# Patient Record
Sex: FEMALE | Race: BLACK OR AFRICAN AMERICAN | Employment: UNEMPLOYED | URBAN - METROPOLITAN AREA
[De-identification: names, ages, dates, MRNs, and addresses within clinical notes are randomized per-mention and may not be internally consistent; named-entity substitution may affect disease eponyms.]

---

## 2017-01-11 ENCOUNTER — CLINICAL SUPPORT (OUTPATIENT)
Dept: SURGERY | Age: 37
End: 2017-01-11

## 2017-01-11 DIAGNOSIS — E66.01 MORBID OBESITY WITH BMI OF 40.0-44.9, ADULT (HCC): Primary | ICD-10-CM

## 2017-01-11 NOTE — PROGRESS NOTES
[id number] [facility]  Supervised Weight Loss     Date:   2017    Patient's Name: Cinda Gardiner  : 1980    Insurance:  Sigrid Wright          Session:   Surgery: Sleeve Gastrectomy  Surgeon:  Fanta Bucio M.D. Height: 68\"   Weight:    271   Lbs. BMI: 41   Pounds Lost since last month: 0               Pounds Gained since last month: 1    Starting Weight: 270#   Previous Months Weight: 270#  Overall Pounds Lost: 0  Overall Pounds Gained: 1#    Other Pertinent Information: Patient reports issues with constipation, likely r/t iron supplements. Smoking Status:  Non-smoker   Alcohol Intake: 1 drinks \"occasionally\"     I have reviewed with patient the guidelines of the supervised weight loss class. Patient understands the expectations of some weight loss during the weight loss trial.  Patient understands that weight gain could delay the process. I have also expressed to patient that classes need to be consecutive. Missing a class may subject patient to have to start their trial over. Patient has received this information in writing. Changes that patient has made since last month include:  Eating more vegetables, drinking protein shakes at breakfast and portion control. Eating Habits and Behaviors      Today we reviewed the general diet principles for weight loss surgery. I have talked with patient about what their plate should be made up of. Their plate should be made up of 1/2 coming from non-starchy vegetables, 1/4 coming from lean meat, and 1/4 of their plate coming from carbohydrates, including fruits, starches, or milk. Emphasis was placed on the importance of eating 3 meals a day and aiming for 60 grams of protein per day. I educated the patient on limiting liquid calories and drinking only calorie-free, sugar-free and non-carbonated beverages.  We discussed the importance of drinking 64 ounces of fluid per day to prevent dehydration post-operatively. A nutrition education lesson regarding vitamin and mineral supplements was provided and the importance of preventing nutrient deficiencies post-operatively. Patient's current diet habits include: eating 2-3 meals per day. Sometimes skipping breakfast d/t lack of time d/t working 2 jobs. Snacking on peanut butter and apples. Limiting refined carbohydrates and sweets. Pt shares grocery shopping and cooking with her daughter eating a combination of baked, grilled, broiled and fried foods. Eating out is 1-3 times per week. Drinks 64 oz water daily and 16 oz unsweetened tea. Recently drinking [employer] beverages but vows to limit d/t high calorie and sugar content. Reports yes to emotional eating and denies situational eating. Packing meals when away from home. Eating most meals at a table and taking 10-15 minutes to finish the meal. States she tends to eat quickly d/t not tasting the food very well d/t mouthpiece that she wears. Denies any difficult chewing or swallowing. Physical Activity/Exercise  During class we discussed the importance of increasing daily physical activity and beginning to develop an exercise regimen/routine. Exercise is an important part of long term weight loss. Comments:  During class, I discussed with patient the importance of getting into an exercise routine. Patient is currently walking for activity. Patient has been encouraged to increase and consider using gym facility at work to add an extra 15 minutes of exercise per day. Behavior Modification       Comments:  Patient was encouraged to keep a food journal and record what they were taking in daily. We discussed the importance of making certain behavior changes in order to be successful long term after weight loss surgery. Overall Assessment: patient demonstrates small realistic changes this past month showing that she understands the general nutrition recommendations.  Will continue to assess patient as she works to complete 6 months supervised wt loss requirements. Goals:   1. Nutrition - working on eating 3 meals a day - prepping hardboiled eggs for week ahead d/t busy schedule  2. Exercise - increase by using the gym at work and adding 15 minutes per day   3.  Behavior -continue to slow eating pace by removing mouth piece to better taste the food which will help slow pace    Diana Martinez, RD  1/11/2017

## 2017-02-05 ENCOUNTER — APPOINTMENT (OUTPATIENT)
Dept: GENERAL RADIOLOGY | Age: 37
End: 2017-02-05
Attending: PHYSICIAN ASSISTANT
Payer: COMMERCIAL

## 2017-02-05 ENCOUNTER — HOSPITAL ENCOUNTER (EMERGENCY)
Age: 37
Discharge: HOME | End: 2017-02-05
Attending: INTERNAL MEDICINE | Admitting: INTERNAL MEDICINE
Payer: COMMERCIAL

## 2017-02-05 VITALS
HEIGHT: 68 IN | TEMPERATURE: 98.3 F | RESPIRATION RATE: 20 BRPM | DIASTOLIC BLOOD PRESSURE: 56 MMHG | WEIGHT: 270 LBS | SYSTOLIC BLOOD PRESSURE: 137 MMHG | OXYGEN SATURATION: 99 % | BODY MASS INDEX: 40.92 KG/M2 | HEART RATE: 98 BPM

## 2017-02-05 DIAGNOSIS — J06.9 ACUTE UPPER RESPIRATORY INFECTION: ICD-10-CM

## 2017-02-05 DIAGNOSIS — J20.9 ACUTE BRONCHITIS, UNSPECIFIED ORGANISM: Primary | ICD-10-CM

## 2017-02-05 DIAGNOSIS — L01.00 IMPETIGO: ICD-10-CM

## 2017-02-05 LAB — HCG UR QL: NEGATIVE

## 2017-02-05 PROCEDURE — 99283 EMERGENCY DEPT VISIT LOW MDM: CPT

## 2017-02-05 PROCEDURE — 71020 XR CHEST PA LAT: CPT

## 2017-02-05 PROCEDURE — 81025 URINE PREGNANCY TEST: CPT

## 2017-02-05 RX ORDER — LORATADINE AND PSEUDOEPHEDRINE 10; 240 MG/1; MG/1
1 TABLET, EXTENDED RELEASE ORAL DAILY
Qty: 10 TAB | Refills: 0 | Status: SHIPPED | OUTPATIENT
Start: 2017-02-05 | End: 2018-03-30

## 2017-02-05 RX ORDER — AZITHROMYCIN 250 MG/1
TABLET, FILM COATED ORAL
Qty: 6 TAB | Refills: 0 | Status: SHIPPED | OUTPATIENT
Start: 2017-02-05 | End: 2017-03-12

## 2017-02-05 RX ORDER — MUPIROCIN 20 MG/G
OINTMENT TOPICAL 3 TIMES DAILY
Qty: 22 G | Refills: 0 | Status: SHIPPED | OUTPATIENT
Start: 2017-02-05 | End: 2017-03-12

## 2017-02-05 NOTE — ED PROVIDER NOTES
Patient is a 42 y.o. female presenting with cold symptoms. The history is provided by the patient. Cold Symptoms    This is a new problem. Episode onset: 1 wk. The problem has not changed since onset. There has been no fever. Associated symptoms include congestion, ear pain, sinus pain, cough and rash. Pertinent negatives include no chest pain, no abdominal pain, no diarrhea, no nausea, no vomiting, no swollen glands and no wheezing. She has tried nothing for the symptoms. Past Medical History:   Diagnosis Date    Asthma     GERD (gastroesophageal reflux disease)     Hx gestational diabetes     Morbid obesity due to excess calories ([facility] [address] 11/17/2016    Trouble in sleeping        No past surgical history on file. Family History:   Problem Relation Age of Onset    Cancer Mother     Diabetes Mother     Hypertension Mother        Social History     Social History    Marital status: SINGLE     Spouse name: N/A    Number of children: 2    Years of education: N/A     Occupational History    Residential Counselor      Social History Main Topics    Smoking status: Former Smoker     Packs/day: 0.25     Years: 15.00    Smokeless tobacco: Never Used    Alcohol use Yes      Comment: occasionally    Drug use: No    Sexual activity: No     Other Topics Concern    Not on file     Social History Narrative         ALLERGIES: Review of patient's allergies indicates no known allergies. Review of Systems   Constitutional: Negative for fever. HENT: Positive for congestion, ear pain and facial swelling. Respiratory: Positive for cough. Negative for shortness of breath, wheezing and stridor. Cardiovascular: Negative for chest pain. Gastrointestinal: Negative for abdominal pain, diarrhea, nausea and vomiting. Musculoskeletal: Positive for myalgias. Skin: Positive for rash and wound. All other systems reviewed and are negative.       Vitals:    02/05/17 1031   BP: 137/56   Pulse: 98   Resp: 20 Temp: 98.3 °F (36.8 °C)   SpO2: 99%   Weight: 122.5 kg (270 lb)   Height: 5' 8\" (1.727 m)            Physical Exam   Constitutional: She is oriented to person, place, and time. She appears well-developed and well-nourished. No distress. HENT:   Head: Normocephalic and atraumatic. Right Ear: Tympanic membrane normal.   Left Ear: Tympanic membrane normal.   Mouth/Throat: Uvula is midline, oropharynx is clear and moist and mucous membranes are normal. No oropharyngeal exudate. Eyes: Conjunctivae are normal.   Cardiovascular: Normal rate, regular rhythm and normal heart sounds. Pulmonary/Chest: Effort normal and breath sounds normal. No respiratory distress. She has no wheezes. She has no rales. Musculoskeletal: Normal range of motion. Neurological: She is alert and oriented to person, place, and time. Skin: Skin is warm and dry. Rash noted. There is erythema. Psychiatric: She has a normal mood and affect. Her behavior is normal. Judgment and thought content normal.   Nursing note and vitals reviewed.        MDM  Number of Diagnoses or Management Options  Diagnosis management comments: DDX: URI, impetigo, PNA, bronchitis       Amount and/or Complexity of Data Reviewed  Clinical lab tests: ordered and reviewed  Tests in the radiology section of CPT®: ordered and reviewed      ED Course       Procedures

## 2017-02-05 NOTE — LETTER
[facility] EMERGENCY DEPT 
[address] [facility] [address] [id number] [phone] Work/School Note Date: 2/5/2017 To Whom It May concern: 
 
Dionicio Inman was seen and treated today in the emergency room by the following provider(s): 
Attending Provider: Don Cabrera MD 
Physician Assistant: Laurie Agosto PA-C. Dionicio Inman may return to work on 2/7/17. Sincerely, Laurie Agosto PA-C

## 2017-02-05 NOTE — ED NOTES
Pt accepted plan of care and NAD noted. Pt refuse W/C and left unit steady gait. Patient (s)  given copy of dc instructions and 3 script(s). Patient(s)  verbalized understanding of instructions and script (s). Patient given a current medication reconciliation form and verbalized understanding of their medications. Patient (s) verbalized understanding of the importance of discussing medications with  his or her physician or clinic when they follow up. Patient alert and oriented and in no acute distress. Pt verbalizes pain scale of 5 out of 10. Patient discharged home ambulatory with self.

## 2017-02-05 NOTE — ED NOTES
Pt return from X-Ray Pt sts cold sx for 1 week and symptoms        Emergency Department Nursing Plan of Care       The Nursing Plan of Care is developed from the Nursing assessment and Emergency Department Attending provider initial evaluation. The plan of care may be reviewed in the ED Provider note.     The Plan of Care was developed with the following considerations:   Patient / Family readiness to learn indicated by:verbalized understanding  Persons(s) to be included in education: patient  Barriers to Learning/Limitations:No    Signed     Adam Soriano RN    2/5/2017   11:29 AM

## 2017-02-05 NOTE — DISCHARGE INSTRUCTIONS
Upper Respiratory Infection (Cold): Care Instructions  Your Care Instructions    An upper respiratory infection, or URI, is an infection of the nose, sinuses, or throat. URIs are spread by coughs, sneezes, and direct contact. The common cold is the most frequent kind of URI. The flu and sinus infections are other kinds of URIs. Almost all URIs are caused by viruses. Antibiotics won't cure them. But you can treat most infections with home care. This may include drinking lots of fluids and taking over-the-counter pain medicine. You will probably feel better in 4 to 10 days. The doctor has checked you carefully, but problems can develop later. If you notice any problems or new symptoms, get medical treatment right away. Follow-up care is a key part of your treatment and safety. Be sure to make and go to all appointments, and call your doctor if you are having problems. It's also a good idea to know your test results and keep a list of the medicines you take. How can you care for yourself at home? · To prevent dehydration, drink plenty of fluids, enough so that your urine is light yellow or clear like water. Choose water and other caffeine-free clear liquids until you feel better. If you have kidney, heart, or liver disease and have to limit fluids, talk with your doctor before you increase the amount of fluids you drink. · Take an over-the-counter pain medicine, such as acetaminophen (Tylenol), ibuprofen (Advil, Motrin), or naproxen (Aleve). Read and follow all instructions on the label. · Before you use cough and cold medicines, check the label. These medicines may not be safe for young children or for people with certain health problems. · Be careful when taking over-the-counter cold or flu medicines and Tylenol at the same time. Many of these medicines have acetaminophen, which is Tylenol. Read the labels to make sure that you are not taking more than the recommended dose.  Too much acetaminophen (Tylenol) can be harmful. · Get plenty of rest.  · Do not smoke or allow others to smoke around you. If you need help quitting, talk to your doctor about stop-smoking programs and medicines. These can increase your chances of quitting for good. When should you call for help? Call 911 anytime you think you may need emergency care. For example, call if:  · You have severe trouble breathing. Call your doctor now or seek immediate medical care if:  · You seem to be getting much sicker. · You have new or worse trouble breathing. · You have a new or higher fever. · You have a new rash. Watch closely for changes in your health, and be sure to contact your doctor if:  · You have a new symptom, such as a sore throat, an earache, or sinus pain. · You cough more deeply or more often, especially if you notice more mucus or a change in the color of your mucus. · You do not get better as expected. Where can you learn more? Go to http://yulisa-willian.info/. Enter H340 in the search box to learn more about \"Upper Respiratory Infection (Cold): Care Instructions. \"  Current as of: June 30, 2016  Content Version: 11.1  © 6977-8610 [employer]. Care instructions adapted under license by [employer] (which disclaims liability or warranty for this information). If you have questions about a medical condition or this instruction, always ask your healthcare professional. [place] [address] any warranty or liability for your use of this information. Bronchitis: Care Instructions  Your Care Instructions    Bronchitis is inflammation of the bronchial tubes, which carry air to the lungs. The tubes swell and produce mucus, or phlegm. The mucus and inflamed bronchial tubes make you cough. You may have trouble breathing. Most cases of bronchitis are caused by viruses like those that cause colds. Antibiotics usually do not help and they may be harmful.   Bronchitis usually develops rapidly and lasts about 2 to 3 weeks in otherwise healthy people. Follow-up care is a key part of your treatment and safety. Be sure to make and go to all appointments, and call your doctor if you are having problems. It's also a good idea to know your test results and keep a list of the medicines you take. How can you care for yourself at home? · Take all medicines exactly as prescribed. Call your doctor if you think you are having a problem with your medicine. · Get some extra rest.  · Take an over-the-counter pain medicine, such as acetaminophen (Tylenol), ibuprofen (Advil, Motrin), or naproxen (Aleve) to reduce fever and relieve body aches. Read and follow all instructions on the label. · Do not take two or more pain medicines at the same time unless the doctor told you to. Many pain medicines have acetaminophen, which is Tylenol. Too much acetaminophen (Tylenol) can be harmful. · Take an over-the-counter cough medicine that contains dextromethorphan to help quiet a dry, hacking cough so that you can sleep. Avoid cough medicines that have more than one active ingredient. Read and follow all instructions on the label. · Breathe moist air from a humidifier, hot shower, or sink filled with hot water. The heat and moisture will thin mucus so you can cough it out. · Do not smoke. Smoking can make bronchitis worse. If you need help quitting, talk to your doctor about stop-smoking programs and medicines. These can increase your chances of quitting for good. When should you call for help? Call 911 anytime you think you may need emergency care. For example, call if:  · You have severe trouble breathing. Call your doctor now or seek immediate medical care if:  · You have new or worse trouble breathing. · You cough up dark brown or bloody mucus (sputum). · You have a new or higher fever. · You have a new rash.   Watch closely for changes in your health, and be sure to contact your doctor if:  · You cough more deeply or more often, especially if you notice more mucus or a change in the color of your mucus. · You are not getting better as expected. Where can you learn more? Go to http://yulisa-willian.info/. Enter H333 in the search box to learn more about \"Bronchitis: Care Instructions. \"  Current as of: May 23, 2016  Content Version: 11.1  © 2006-2016 [employer]. Care instructions adapted under license by "[employer] (which disclaims liability or warranty for this information). If you have questions about a medical condition or this instruction, always ask your healthcare professional. [place] [address] any warranty or liability for your use of this information. Impetigo: Care Instructions  Your Care Instructions  Impetigo (say \"dr-zzl-OQ-go\") is a skin infection caused by bacteria. It causes blisters that break and become oozing, yellow, crusty sores. Impetigo can be anywhere on the body. Scratching the sores may spread the infection to other parts of the body. You can also spread it to others through close contact or when you share towels, clothing, and other items. Prescription antibiotic ointment or pills can usually cure impetigo. (After a day of antibiotics, the infection should not spread.)  Follow-up care is a key part of your treatment and safety. Be sure to make and go to all appointments, and call your doctor if you are having problems. It's also a good idea to know your test results and keep a list of the medicines you take. How can you care for yourself at home? · Apply antibiotic ointment exactly as instructed. · If your doctor prescribed antibiotic pills, take them as directed. Do not stop using them just because you feel better. You need to take the full course of antibiotics. · Gently wash the sores with soap and water each day. If crusts form, your doctor may advise you to soften or remove the crusts.  You can do this by soaking them in warm water and patting them dry. This can help the cream or ointment treat impetigo. · After you touch the area, wash your hands with soap and water. Or you can use an alcohol-based hand . · Don't share items such as towels, sheets, and clothing until the infection is gone. · Wash anything that may have touched the infected area. · Try to avoid scratching the area. When should you call for help? Call your doctor now or seek immediate medical care if:  · You have symptoms of a worse infection, such as:  ¨ Increased pain, swelling, warmth, or redness. ¨ Red streaks leading from the area. ¨ Pus draining from the area. ¨ A fever. · Impetigo gets worse or spreads to other areas. Watch closely for changes in your health, and be sure to contact your doctor if:  · You do not get better as expected. Where can you learn more? Go to http://yulisa-willian.info/. Enter R056 in the search box to learn more about \"Impetigo: Care Instructions. \"  Current as of: July 26, 2016  Content Version: 11.1  © 4375-7912 [employer]. Care instructions adapted under license by [employer] (which disclaims liability or warranty for this information). If you have questions about a medical condition or this instruction, always ask your healthcare professional. [address] any warranty or liability for your use of this information.

## 2017-02-15 ENCOUNTER — CLINICAL SUPPORT (OUTPATIENT)
Dept: SURGERY | Age: 37
End: 2017-02-15

## 2017-02-15 DIAGNOSIS — E66.01 MORBID OBESITY WITH BMI OF 40.0-44.9, ADULT (HCC): Primary | ICD-10-CM

## 2017-02-16 VITALS — WEIGHT: 276 LBS | HEIGHT: 68 IN | BODY MASS INDEX: 41.83 KG/M2

## 2017-02-16 NOTE — PROGRESS NOTES
[address] [facility]  Supervised Weight Loss     Date:   2017    Patient's Name: Charlotta Gowers  : 1980    Insurance:  Pricilla Pelayo          Session: 3 of  6  Surgery: Sleeve Gastrectomy  Surgeon:  Cesar Agrawal M.D. Height: 68\"   Weight:    276      Lbs. BMI: 41   Pounds Lost since last month: 0               Pounds Gained since last month: 5#    Starting Weight: 270#   Previous Months Weight: 271#   Overall Pounds Lost: 0  Overall Pounds Gained: 6#    Other Pertinent Information: Patient reports difficulty with making changes this past month d/t working two jobs and limited time. Smoking Status:  None   Alcohol Intake: none     I have reviewed with patient the guidelines of the supervised weight loss class. Patient understands the expectations of some weight loss during the weight loss trial.  Patient understands that weight gain could delay the process. I have also expressed to patient that classes need to be consecutive. Missing a class may subject patient to have to start their trial over. Patient has received this information in writing. Changes that patient has made since last month include:  Sipping water, portion control, 2 meals a day and a protein shake. Eating Habits and Behaviors      Today we reviewed the general diet principles for weight loss surgery. I have talked with patient about what their plate should be made up of. Their plate should be made up of 1/2 coming from non-starchy vegetables, 1/4 coming from lean meat, and 1/4 of their plate coming from carbohydrates, including fruits, starches, or milk. Emphasis was placed on the importance of eating 3 meals a day and aiming for 60 grams of protein per day. I educated the patient on limiting liquid calories and drinking only calorie-free, sugar-free and non-carbonated beverages.  We discussed the importance of drinking 64 ounces of fluid per day to prevent dehydration post-operatively. Patient's current diet habits include: eating 3 meals a day, snacking on fruit and donuts. Eating donuts 3 times per week. Eating more chips, pasta, rice, cereal and bread recently d/t limited packing and preparing meals at home d/t busy schedule with working 2 jobs. Eating out 1-3 times per week. Eating a mixture of baked, grilled, broiled and fried foods. Drinking 48 oz fluids daily of which 32 oz is water, 16 oz is unsweetened tea and occasional ginger ale and coffee. Denies emotional or situational eating. Was previously packing meals to take to work but has limited ability the past few weeks. Is planning to resume. States lack of activity, snacking and eating at night are her biggest challenges. Physical Activity/Exercise  During class we discussed the importance of increasing daily physical activity and beginning to develop an exercise regimen/routine. Exercise is an important part of long term weight loss. Comments:  During class, I discussed with patient the importance of getting into an exercise routine. Patient is currently not exercising but has increased amount of daily movement and tracking her steps. Patient has been encouraged to continue to increase as tolerated. Behavior Modification       Comments:  Patient was encouraged to keep a food journal and record what they were taking in daily. We discussed the importance of making certain behavior changes in order to be successful long term after weight loss surgery. An education lesson regarding mindful eating behaviors was provided. Emphasis was placed on taking smaller bites, chewing foods slowly and thoroughly, allowing 20 minutes to finish a meal and avoiding distractions while eating. We discussed emotional eating and non-food coping mechanisms. Overall Assessment: Patient demonstrates understanding of general nutrition guidelines for weight loss surgery. Is aware of difficulty with making changes this past month d/t working two jobs and time constraints. Plans to resume previously made changes and is hoping to consolidate to one job in the future so she has more time to focus on lifestyle changes. Goals:   1. Nutrition - packs meals on Mondays and Wednesday for several days ahead  2. Exercise - continue to increase # of steps per day  3.  Behavior -small bites and chew foods slowly and thoroughly    Stan Garcia RD  2/16/2017

## 2017-03-12 ENCOUNTER — APPOINTMENT (OUTPATIENT)
Dept: GENERAL RADIOLOGY | Age: 37
End: 2017-03-12
Attending: PHYSICIAN ASSISTANT
Payer: COMMERCIAL

## 2017-03-12 ENCOUNTER — HOSPITAL ENCOUNTER (EMERGENCY)
Age: 37
Discharge: HOME | End: 2017-03-12
Attending: EMERGENCY MEDICINE | Admitting: EMERGENCY MEDICINE
Payer: COMMERCIAL

## 2017-03-12 VITALS
BODY MASS INDEX: 40.47 KG/M2 | RESPIRATION RATE: 18 BRPM | WEIGHT: 267 LBS | SYSTOLIC BLOOD PRESSURE: 143 MMHG | DIASTOLIC BLOOD PRESSURE: 83 MMHG | HEART RATE: 95 BPM | OXYGEN SATURATION: 97 % | TEMPERATURE: 98.1 F | HEIGHT: 68 IN
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
## 2017-03-12 DIAGNOSIS — S53.402A ELBOW SPRAIN, LEFT, INITIAL ENCOUNTER: ICD-10-CM

## 2017-03-12 DIAGNOSIS — S91.119A TOE LACERATION, INITIAL ENCOUNTER: ICD-10-CM

## 2017-03-12 DIAGNOSIS — S80.02XA CONTUSION OF LEFT KNEE, INITIAL ENCOUNTER: ICD-10-CM

## 2017-03-12 DIAGNOSIS — Z23 NEED FOR TETANUS BOOSTER: ICD-10-CM

## 2017-03-12 PROCEDURE — 99283 EMERGENCY DEPT VISIT LOW MDM: CPT

## 2017-03-12 PROCEDURE — 73562 X-RAY EXAM OF KNEE 3: CPT

## 2017-03-12 PROCEDURE — 74011250636 HC RX REV CODE- 250/636: Performed by: PHYSICIAN ASSISTANT

## 2017-03-12 PROCEDURE — 90715 TDAP VACCINE 7 YRS/> IM: CPT | Performed by: PHYSICIAN ASSISTANT

## 2017-03-12 PROCEDURE — 90471 IMMUNIZATION ADMIN: CPT

## 2017-03-12 PROCEDURE — 74011250637 HC RX REV CODE- 250/637: Performed by: PHYSICIAN ASSISTANT

## 2017-03-12 PROCEDURE — L1830 KO IMMOB CANVAS LONG PRE OTS: HCPCS

## 2017-03-12 PROCEDURE — 73080 X-RAY EXAM OF ELBOW: CPT

## 2017-03-12 RX ORDER — TRAMADOL HYDROCHLORIDE 50 MG/1
50 TABLET ORAL
Qty: 15 TAB | Refills: 0 | Status: SHIPPED | OUTPATIENT
Start: 2017-03-12 | End: 2017-04-21

## 2017-03-12 RX ORDER — IBUPROFEN 600 MG/1
600 TABLET ORAL
Status: COMPLETED | OUTPATIENT
Start: 2017-03-12 | End: 2017-03-12

## 2017-03-12 RX ORDER — IBUPROFEN 600 MG/1
600 TABLET ORAL
Qty: 30 TAB | Refills: 0 | Status: SHIPPED | OUTPATIENT
Start: 2017-03-12 | End: 2017-04-21

## 2017-03-12 RX ORDER — TRAMADOL HYDROCHLORIDE 50 MG/1
50 TABLET ORAL
Status: COMPLETED | OUTPATIENT
Start: 2017-03-12 | End: 2017-03-12

## 2017-03-12 RX ADMIN — IBUPROFEN 600 MG: 600 TABLET, FILM COATED ORAL at 17:50

## 2017-03-12 RX ADMIN — TRAMADOL HYDROCHLORIDE 50 MG: 50 TABLET, FILM COATED ORAL at 17:50

## 2017-03-12 RX ADMIN — TETANUS TOXOID, REDUCED DIPHTHERIA TOXOID AND ACELLULAR PERTUSSIS VACCINE, ADSORBED 0.5 ML: 5; 2.5; 8; 8; 2.5 SUSPENSION INTRAMUSCULAR at 17:49

## 2017-03-12 NOTE — ED NOTES
Patient states she is here today with c/o of a fall in the bathtub on Friday (3 days ago). She denies hitting her head. Left knee, right flank and left elbow and states she cut her right big toe.

## 2017-03-12 NOTE — ED PROVIDER NOTES
Patient is a 42 y.o. female presenting with fall. The history is provided by the patient. Fall   The accident occurred 2 days ago. Fall occurred: sliped when getting into tub. landed on left knee and left elbow. She fell from a height of ground level. The pain is present in the left elbow and left knee. The pain is at a severity of 7/10. She was ambulatory at the scene. There was no entrapment after the fall. There was no drug use involved in the accident. There was no alcohol use involved in the accident. Pertinent negatives include no fever, no nausea, no vomiting and no loss of consciousness. Laceration: Did notice that she had R toe laceration after fall, unsure what cut on. Exacerbated by: Twisting, walking. She has tried NSAIDs and acetaminophen for the symptoms. It is unknown when the patient last had a tetanus shot. Past Medical History:   Diagnosis Date    Asthma     GERD (gastroesophageal reflux disease)     Hx gestational diabetes     Morbid obesity due to excess calories ([facility] [address] 11/17/2016    Trouble in sleeping        No past surgical history on file. Family History:   Problem Relation Age of Onset    Cancer Mother     Diabetes Mother     Hypertension Mother        Social History     Social History    Marital status: SINGLE     Spouse name: N/A    Number of children: 2    Years of education: N/A     Occupational History    Residential Counselor      Social History Main Topics    Smoking status: Former Smoker     Packs/day: 0.25     Years: 15.00    Smokeless tobacco: Never Used    Alcohol use Yes      Comment: occasionally    Drug use: No    Sexual activity: No     Other Topics Concern    Not on file     Social History Narrative         ALLERGIES: Review of patient's allergies indicates no known allergies. Review of Systems   Constitutional: Negative for chills and fever. Eyes: Negative for pain. Respiratory: Negative for cough and chest tightness. Cardiovascular: Negative for chest pain and leg swelling. Gastrointestinal: Negative for nausea and vomiting. Musculoskeletal: Positive for arthralgias and myalgias. Skin: Positive for wound (laceration R great toe. ). Neurological: Negative for dizziness, loss of consciousness, syncope and light-headedness. Psychiatric/Behavioral: Negative for behavioral problems and confusion. All other systems reviewed and are negative. Vitals:    03/12/17 1655   BP: 146/79   Pulse: 95   Resp: 18   Temp: 98.1 °F (36.7 °C)   SpO2: 99%   Weight: 121.1 kg (267 lb)   Height: 5' 8\" (1.727 m)            Physical Exam   Constitutional: She is oriented to person, place, and time. She appears well-developed and well-nourished. HENT:   Head: Normocephalic and atraumatic. Eyes: Pupils are equal, round, and reactive to light. Neck: Normal range of motion. Neck supple. Cardiovascular: Normal rate, regular rhythm and normal heart sounds. Pulmonary/Chest: Effort normal. She has no wheezes. She has no rales. Abdominal: Soft. There is no rebound and no guarding. Lymphadenopathy:     She has no cervical adenopathy. Neurological: She is alert and oriented to person, place, and time. Skin: Skin is warm and dry. Laceration: Did notice that she had R toe laceration after fall, unsure what cut on. Psychiatric: She has a normal mood and affect. Her behavior is normal.   Nursing note and vitals reviewed. C/T/L spine no vertebral tenderness, no step offs,   Shoulders, wrist, hands, ankles, NT  Left elbow with tenderness over olecranon. No bony tenderness to medial/lateral condyles. FROM with pain on supination/ pronation. Skin intact. No swelling. Left knee, diffuse tenderness, mostly to lateral knee. No effusion. Pain with flexion. Stable with ligamentous stress, pain with meniscal maneuvers. R great toe with 1cm shallow laceration. No swelling, tenderness, no drainage. No surrounding erythema. MDM  Number of Diagnoses or Management Options  Contusion of left knee, initial encounter:   Elbow sprain, left, initial encounter:   Fall, initial encounter:   Need for tetanus booster: Toe laceration, initial encounter:   Diagnosis management comments: DDX: Fx, sprain, dislocation, Tendon / Ligament injury    ED Course       Procedures    LABORATORY TESTS:  No results found for this or any previous visit (from the past 12 hour(s)). IMAGING RESULTS:  XR KNEE LT 3 V   Final Result      XR ELBOW LT MIN 3 V   Final Result          MEDICATIONS GIVEN:  Medications   ibuprofen (MOTRIN) tablet 600 mg (600 mg Oral Given 3/12/17 1750)   traMADol (ULTRAM) tablet 50 mg (50 mg Oral Given 3/12/17 1750)   diph,Pertuss(AC),Tet Vac-PF (BOOSTRIX) suspension 0.5 mL (0.5 mL IntraMUSCular Given 3/12/17 174)       IMPRESSION:  1. Fall, initial encounter    2. Elbow sprain, left, initial encounter    3. Contusion of left knee, initial encounter    4. Need for tetanus booster    5. Toe laceration, initial encounter        PLAN:  1. Discharge Medication List as of 3/12/2017  6:25 PM      START taking these medications    Details   ibuprofen (MOTRIN) 600 mg tablet Take 1 Tab by mouth every eight (8) hours as needed for Pain., Normal, Disp-30 Tab, R-0      traMADol (ULTRAM) 50 mg tablet Take 1 Tab by mouth every six (6) hours as needed for Pain. Max Daily Amount: 200 mg., Print, Disp-15 Tab, R-0         CONTINUE these medications which have NOT CHANGED    Details   omeprazole (PRILOSEC) 20 mg capsule Take 20 mg by mouth daily. , Historical Med      Cholecalciferol, Vitamin D3, 50,000 unit cap Take  by mouth every seven (7) days. , Historical Med      loratadine-pseudoephedrine (CLARITIN-D 24 HOUR)  mg per tablet Take 1 Tab by mouth daily. , Normal, Disp-10 Tab, R-0         STOP taking these medications       meloxicam (MOBIC) 15 mg tablet Comments:   Reason for Stoppin.   Follow-up Information     Follow up With Details Comments Contact Info    Luis Ndiaye NP In 1 week If not improving, be sure to follow up with PCP for further management.   [address]  [address]  [phone]          Return to ED if worse

## 2017-03-12 NOTE — ED NOTES
Emergency Department Nursing Plan of Care       The Nursing Plan of Care is developed from the Nursing assessment and Emergency Department Attending provider initial evaluation. The plan of care may be reviewed in the ED Provider note.     The Plan of Care was developed with the following considerations:   Patient / Family readiness to learn indicated by:verbalized understanding  Persons(s) to be included in education: patient  Barriers to Learning/Limitations:No    [address] Sar[address], RN    3/12/2017   5:32 PM

## 2017-03-28 ENCOUNTER — DOCUMENTATION ONLY (OUTPATIENT)
Dept: SURGERY | Age: 37
End: 2017-03-28

## 2017-03-28 ENCOUNTER — CLINICAL SUPPORT (OUTPATIENT)
Dept: SURGERY | Age: 37
End: 2017-03-28

## 2017-03-28 VITALS — HEIGHT: 68 IN | BODY MASS INDEX: 42.74 KG/M2 | WEIGHT: 282 LBS

## 2017-03-28 DIAGNOSIS — E66.01 MORBID OBESITY WITH BMI OF 40.0-44.9, ADULT (HCC): Primary | ICD-10-CM

## 2017-03-28 NOTE — PROGRESS NOTES
[id number] [facility] at [facility]  Supervised Weight Loss     Date:   3/28/2017    Patient's Name: Argelia Loza  : 1980    Insurance:  Shadi Paul          Session:   Surgery: Sleeve Gastrectomy  Surgeon:  Marlena Bernard M.D. Height: 68\"  Weight:    282      Lbs. BMI: 42   Pounds Lost since last month: 0               Pounds Gained since last month: 6#    Starting Weight: 270#   Previous Months Weight: 276#  Overall Pounds Lost: 0  Overall Pounds Gained: 12#    Other Pertinent Information: n/a     Smoking Status:  None   Alcohol Intake: none     I have reviewed with patient the guidelines of the supervised weight loss class. Patient understands the expectations of some weight loss during the weight loss trial.  Patient understands that weight gain could delay the process. I have also expressed to patient that classes need to be consecutive. Missing a class may subject patient to have to start their trial over. Patient has received this information in writing. Changes that patient has made since last month include:  Sipping water, protein shakes. Eating Habits and Behaviors      Today we reviewed the general diet principles for weight loss surgery. And education less focused specifically on portion control both before and after surgery was provided. We discussed that their plate should be made up of 1/2 plate non-starchy vegetables, 1/4 coming from lean meat, and 1/4 of their plate coming from carbohydrates, including fruits, starches, or milk. We talked about the importance of measuring meals to 1/2 cup total after surgery and making at least half of the meal lean protein. Emphasis was placed on the importance of eating 3 meals a day and aiming for 60 grams of protein per day. I educated the patient on limiting liquid calories and drinking only calorie-free, sugar-free and non-carbonated beverages.  We discussed the importance of drinking 64 ounces of fluid per day to prevent dehydration post-operatively. Patient's current diet habits include: eating 3 meals a day, snacking on fruit. Eating refined carbohydrates (chips, bread) 1-2 times per week and ice cream 1 time per week. Pt does her own grocery shopping and cooking. Eating a mixture of baked, grilled, broiled and fried foods. Eating out is 1-2 times per week. Drinking 64 oz water daily and 12 oz black coffee. Denies emotional eating and sometimes situational eating. Eating most meals at work and takes 10-15 minutes to finish the meal. States food choices and lack of activity are biggest barriers to weight loss. Physical Activity/Exercise  During class we discussed the importance of increasing daily physical activity and beginning to develop an exercise regimen/routine. Exercise is an important part of long term weight loss. Comments:  During class, I discussed with patient the importance of getting into an exercise routine. Patient is currently not exercising stating lack of time for activity. Patient has been encouraged to consider short intervals spaced throughout the day. Utilize gym/exercise equipment at work. Behavior Modification       Comments:  Patient was encouraged to keep a food journal and record what they were taking in daily. We discussed the importance of making certain behavior changes in order to be successful long term after weight loss surgery. Overall Assessment: Patient demonstrates difficulty with making consistent lifestyle changes evidenced by weight gain and reported limited adherence to nutrition related recommendations. Difficulty appears to be r/t limited time and busy work schedules. Will continue to assess readiness as she works to complete supervised weight loss requirements. Goals:   1. Nutrition - prepare healthy meals at home  2. Exercise - join a gym  3.  Behavior -focus on goals    Saul Bowling RD  3/28/2017

## 2017-03-31 ENCOUNTER — TELEPHONE (OUTPATIENT)
Dept: SURGERY | Age: 37
End: 2017-03-31

## 2017-03-31 DIAGNOSIS — A04.8 POSITIVE H. PYLORI TEST: Primary | ICD-10-CM

## 2017-03-31 DIAGNOSIS — K21.9 GASTROESOPHAGEAL REFLUX DISEASE WITHOUT ESOPHAGITIS: ICD-10-CM

## 2017-03-31 NOTE — TELEPHONE ENCOUNTER
Telephone call with patient to discuss recent positive H.Pylori results. Advised patient will send in prescription for generic Helidac pack to take for 2 weeks. Will mail labs slip to recheck. H. Pylori. Informed patient that if results are positive again after this treatment will consult GI. Patient agreed. Advised if any questions or concerns to call the office.

## 2017-04-14 ENCOUNTER — TELEPHONE (OUTPATIENT)
Dept: SURGERY | Age: 37
End: 2017-04-14

## 2017-04-14 DIAGNOSIS — A04.8 POSITIVE H. PYLORI TEST: ICD-10-CM

## 2017-04-14 DIAGNOSIS — K21.9 GASTROESOPHAGEAL REFLUX DISEASE WITHOUT ESOPHAGITIS: Primary | ICD-10-CM

## 2017-04-14 RX ORDER — METRONIDAZOLE 500 MG/1
500 TABLET ORAL 3 TIMES DAILY
Qty: 42 TAB | Refills: 0 | Status: SHIPPED | OUTPATIENT
Start: 2017-04-14 | End: 2017-04-28

## 2017-04-14 NOTE — TELEPHONE ENCOUNTER
Telephone call with patient. Patient stated insurance will not cover for Helidac or generic of for positive H. Pylori treatment. Advised patient to take over the counter Pepto Bismuth tablets twice daily for 2 weeks. Will give prescription for Flagyl to take for 2 week. Advised patient once complete treatment, recheck H. Pylori in one month. Patient agreed. Advised if any questions or concerns to call the office.

## 2017-04-18 ENCOUNTER — CLINICAL SUPPORT (OUTPATIENT)
Dept: SURGERY | Age: 37
End: 2017-04-18

## 2017-04-18 VITALS — BODY MASS INDEX: 42.73 KG/M2 | WEIGHT: 281 LBS

## 2017-04-18 DIAGNOSIS — E66.01 MORBID OBESITY WITH BMI OF 40.0-44.9, ADULT (HCC): Primary | ICD-10-CM

## 2017-04-18 NOTE — PROGRESS NOTES
[address] [facility] Surgery at [facility]  Supervised Weight Loss     Date:   2017    Patient's Name: Oralia Ambriz  : 1980    Insurance:  Ashleigh Monae          Session:   Surgery: Sleeve Gastrectomy  Surgeon:  Tanja Dasilva M.D. Height: 68\"  Weight:    281      Lbs. BMI: 42   Pounds Lost since last month: 1#               Pounds Gained since last month: 0    Starting Weight: 270#   Previous Months Weight: 282#  Overall Pounds Lost: 0  Overall Pounds Gained: 11#    Other Pertinent Information: Patient currently being treated for H.Pylori and has upcoming GI consult d/t ongoing \"stomach problems\". Smoking Status:  None   Alcohol Intake: none     I have reviewed with patient the guidelines of the supervised weight loss class. Patient understands the expectations of some weight loss during the weight loss trial.  Patient understands that weight gain could delay the process. I have also expressed to patient that classes need to be consecutive. Missing a class may subject patient to have to start their trial over. Patient has received this information in writing. Changes that patient has made since last month include:  Eating more salads, drinking more water. Eating Habits and Behaviors      Today we reviewed the general diet principles for weight loss surgery. We discussed portion control before surgery using the balanced plate method (1/2 plate non-starchy vegetables, 1/4 coming from lean meat, and 1/4 of their plate coming from carbohydrates). We talked about the importance of measuring meals to 1/2 cup total after surgery and making at least half of the meal lean protein. Emphasis was placed on the importance of eating 3 meals a day and aiming for 60 grams of protein per day. I educated the patient on limiting liquid calories and drinking only calorie-free, sugar-free and non-carbonated beverages.  We discussed the importance of drinking 64 ounces of fluid per day to prevent dehydration post-operatively. Patient's current diet habits include: eating 3 meals a day, snacking on fruit or string cheese. Eating crackers, rice and cereal 1-3 times per week. Avoiding sweets and desserts. Pt does the grocery shopping and cooking for the household. Eating mostly baked, broiled, grilled and steamed foods. Eating out is never. \"Sipping\" a gallon of water daily. Denies emotional or situational eating. Packing meals to take to work. Eating at a table at work and takes 15-20 minutes to chew foods. States lack of activity is biggest barrier to weight loss at this time. Physical Activity/Exercise  During class we discussed the importance of increasing daily physical activity and beginning to develop an exercise regimen/routine. An education lesson was provided including exercise programs and resources, tips for getting started and overcoming barriers and health benefits of exercise. We discussed that exercise is an important part of long term weight loss. Comments:  During class, I discussed with patient the importance of getting into an exercise routine. Patient is currently not exercising d/t lack of time for activity. Patient has been encouraged to consider short intervals of walking spaced throughout the day. Continue to take stairs at work and park further away to help increase daily movement. Behavior Modification       Comments:  Patient was encouraged to keep a food journal and record what they were taking in daily. We discussed the importance of making certain behavior changes in order to be successful long term after weight loss surgery. Overall Assessment: Patient demonstrates small realistic lifestyle changes in preparation for weight loss surgery evidenced by reported changes and discussion during appointment today. Appears motivated to maintain discussed changes today.  Pt completed psychological evaluation at which time it was recommended that pt bring her children to a nutrition session to help them understand the changes to the eating habits and lifestyle after wt loss surgery. Pt will bring them to next month's appointment. Will continue to assess patient as she works to complete final two months of supervised wt loss. Goals:   1. Nutrition - continue to prepare foods at home and eating healthy snacks  2. Exercise - use [employer] membership 3 times per week for 1 hour  3.  Behavior - stay positive and motivated     Cesar Nicolas, RD  4/18/2017

## 2017-04-21 RX ORDER — ALBUTEROL SULFATE 90 UG/1
2 AEROSOL, METERED RESPIRATORY (INHALATION) AS NEEDED
COMMUNITY

## 2017-04-24 ENCOUNTER — ANESTHESIA EVENT (OUTPATIENT)
Dept: ENDOSCOPY | Age: 37
End: 2017-04-24
Payer: COMMERCIAL

## 2017-04-24 ENCOUNTER — HOSPITAL ENCOUNTER (OUTPATIENT)
Age: 37
Setting detail: OUTPATIENT SURGERY
Discharge: HOME | End: 2017-04-24
Attending: INTERNAL MEDICINE | Admitting: INTERNAL MEDICINE
Payer: COMMERCIAL

## 2017-04-24 ENCOUNTER — ANESTHESIA (OUTPATIENT)
Dept: ENDOSCOPY | Age: 37
End: 2017-04-24
Payer: COMMERCIAL

## 2017-04-24 VITALS
HEIGHT: 68 IN | WEIGHT: 282.06 LBS | TEMPERATURE: 97.8 F | RESPIRATION RATE: 14 BRPM | OXYGEN SATURATION: 100 % | BODY MASS INDEX: 42.75 KG/M2 | HEART RATE: 76 BPM | SYSTOLIC BLOOD PRESSURE: 122 MMHG | DIASTOLIC BLOOD PRESSURE: 76 MMHG

## 2017-04-24 LAB
H PYLORI FROM TISSUE: POSITIVE
HCG UR QL: NEGATIVE
KIT LOT NO., HCLOLOT: NORMAL
NEGATIVE CONTROL: NORMAL
POSITIVE CONTROL: NORMAL

## 2017-04-24 PROCEDURE — 81025 URINE PREGNANCY TEST: CPT

## 2017-04-24 PROCEDURE — 87077 CULTURE AEROBIC IDENTIFY: CPT | Performed by: INTERNAL MEDICINE

## 2017-04-24 PROCEDURE — 76040000007: Performed by: INTERNAL MEDICINE

## 2017-04-24 PROCEDURE — 74011000250 HC RX REV CODE- 250

## 2017-04-24 PROCEDURE — 88305 TISSUE EXAM BY PATHOLOGIST: CPT | Performed by: INTERNAL MEDICINE

## 2017-04-24 PROCEDURE — 76060000032 HC ANESTHESIA 0.5 TO 1 HR: Performed by: INTERNAL MEDICINE

## 2017-04-24 PROCEDURE — 74011250636 HC RX REV CODE- 250/636: Performed by: INTERNAL MEDICINE

## 2017-04-24 PROCEDURE — 74011250636 HC RX REV CODE- 250/636

## 2017-04-24 PROCEDURE — 77030019988 HC FCPS ENDOSC DISP BSC -B: Performed by: INTERNAL MEDICINE

## 2017-04-24 RX ORDER — SODIUM CHLORIDE 0.9 % (FLUSH) 0.9 %
5-10 SYRINGE (ML) INJECTION AS NEEDED
Status: CANCELLED | OUTPATIENT
Start: 2017-04-24 | End: 2017-04-24

## 2017-04-24 RX ORDER — FLUMAZENIL 0.1 MG/ML
0.2 INJECTION INTRAVENOUS
Status: CANCELLED | OUTPATIENT
Start: 2017-04-24 | End: 2017-04-24

## 2017-04-24 RX ORDER — PROPOFOL 10 MG/ML
INJECTION, EMULSION INTRAVENOUS AS NEEDED
Status: DISCONTINUED | OUTPATIENT
Start: 2017-04-24 | End: 2017-04-24 | Stop reason: HOSPADM

## 2017-04-24 RX ORDER — SODIUM CHLORIDE 0.9 % (FLUSH) 0.9 %
5-10 SYRINGE (ML) INJECTION AS NEEDED
Status: DISCONTINUED | OUTPATIENT
Start: 2017-04-24 | End: 2017-04-24 | Stop reason: HOSPADM

## 2017-04-24 RX ORDER — NALOXONE HYDROCHLORIDE 0.4 MG/ML
0.4 INJECTION, SOLUTION INTRAMUSCULAR; INTRAVENOUS; SUBCUTANEOUS
Status: CANCELLED | OUTPATIENT
Start: 2017-04-24 | End: 2017-04-24

## 2017-04-24 RX ORDER — DEXTROMETHORPHAN/PSEUDOEPHED 2.5-7.5/.8
1.2 DROPS ORAL
Status: CANCELLED | OUTPATIENT
Start: 2017-04-24

## 2017-04-24 RX ORDER — ATROPINE SULFATE 0.1 MG/ML
0.5 INJECTION INTRAVENOUS
Status: CANCELLED | OUTPATIENT
Start: 2017-04-24 | End: 2017-04-24

## 2017-04-24 RX ORDER — EPINEPHRINE 0.1 MG/ML
1 INJECTION INTRACARDIAC; INTRAVENOUS
Status: CANCELLED | OUTPATIENT
Start: 2017-04-24 | End: 2017-04-24

## 2017-04-24 RX ORDER — SODIUM CHLORIDE 0.9 % (FLUSH) 0.9 %
5-10 SYRINGE (ML) INJECTION EVERY 8 HOURS
Status: CANCELLED | OUTPATIENT
Start: 2017-04-24 | End: 2017-04-24

## 2017-04-24 RX ORDER — SODIUM CHLORIDE 0.9 % (FLUSH) 0.9 %
5-10 SYRINGE (ML) INJECTION EVERY 8 HOURS
Status: DISCONTINUED | OUTPATIENT
Start: 2017-04-24 | End: 2017-04-24 | Stop reason: HOSPADM

## 2017-04-24 RX ORDER — LIDOCAINE HYDROCHLORIDE 20 MG/ML
INJECTION, SOLUTION EPIDURAL; INFILTRATION; INTRACAUDAL; PERINEURAL AS NEEDED
Status: DISCONTINUED | OUTPATIENT
Start: 2017-04-24 | End: 2017-04-24 | Stop reason: HOSPADM

## 2017-04-24 RX ORDER — SODIUM CHLORIDE 9 MG/ML
50 INJECTION, SOLUTION INTRAVENOUS CONTINUOUS
Status: DISCONTINUED | OUTPATIENT
Start: 2017-04-24 | End: 2017-04-24 | Stop reason: HOSPADM

## 2017-04-24 RX ADMIN — PROPOFOL 50 MG: 10 INJECTION, EMULSION INTRAVENOUS at 11:21

## 2017-04-24 RX ADMIN — SODIUM CHLORIDE 50 ML/HR: 900 INJECTION, SOLUTION INTRAVENOUS at 11:04

## 2017-04-24 RX ADMIN — PROPOFOL 100 MG: 10 INJECTION, EMULSION INTRAVENOUS at 11:23

## 2017-04-24 RX ADMIN — LIDOCAINE HYDROCHLORIDE 50 MG: 20 INJECTION, SOLUTION EPIDURAL; INFILTRATION; INTRACAUDAL; PERINEURAL at 11:20

## 2017-04-24 RX ADMIN — PROPOFOL 100 MG: 10 INJECTION, EMULSION INTRAVENOUS at 11:20

## 2017-04-24 RX ADMIN — PROPOFOL 50 MG: 10 INJECTION, EMULSION INTRAVENOUS at 11:22

## 2017-04-24 RX ADMIN — PROPOFOL 210 MG: 10 INJECTION, EMULSION INTRAVENOUS at 11:42

## 2017-04-24 NOTE — PERIOP NOTES
Anesthesia reports 510 mg Propofol, 50 mg Lidocaine and 400 mL NS given during procedure. Received report from anesthesia staff on vital signs and status of patient.

## 2017-04-24 NOTE — ANESTHESIA POSTPROCEDURE EVALUATION
Post-Anesthesia Evaluation and Assessment    Patient: Michael Montejo MRN: [id number]  SSN: [id number]    YOB: 1980  Age: 42 y.o. Sex: female       Cardiovascular Function/Vital Signs  Visit Vitals    /76    Pulse 76    Temp 36.6 °C (97.8 °F)    Resp 14    Ht 5' 8\" (1.727 m)    Wt 127.9 kg (282 lb 1 oz)    SpO2 100%    Breastfeeding No    BMI 42.89 kg/m2       Patient is status post general, total IV anesthesia anesthesia for Procedure(s):  COLONOSCOPY  ESOPHAGOGASTRODUODENOSCOPY (EGD)  ESOPHAGOGASTRODUODENAL (EGD) BIOPSY. Nausea/Vomiting: None    Postoperative hydration reviewed and adequate. Pain:  Pain Scale 1: Numeric (0 - 10) (04/24/17 1229)  Pain Intensity 1: 0 (04/24/17 1229)   Managed    Neurological Status: At baseline    Mental Status and Level of Consciousness: Arousable    Pulmonary Status:   O2 Device: Room air (04/24/17 1229)   Adequate oxygenation and airway patent    Complications related to anesthesia: None    Post-anesthesia assessment completed.  No concerns    Signed By: David Patel MD     April 24, 2017

## 2017-04-24 NOTE — ANESTHESIA PREPROCEDURE EVALUATION
Anesthetic History   No history of anesthetic complications            Review of Systems / Medical History  Patient summary reviewed, nursing notes reviewed and pertinent labs reviewed    Pulmonary            Asthma        Neuro/Psych   Within defined limits           Cardiovascular  Within defined limits                Exercise tolerance: >4 METS     GI/Hepatic/Renal     GERD           Endo/Other        Morbid obesity     Other Findings              Physical Exam    Airway  Mallampati: III  TM Distance: 4 - 6 cm  Neck ROM: normal range of motion   Mouth opening: Normal     Cardiovascular  Regular rate and rhythm,  S1 and S2 normal,  no murmur, click, rub, or gallop             Dental  No notable dental hx       Pulmonary  Breath sounds clear to auscultation               Abdominal  GI exam deferred       Other Findings            Anesthetic Plan    ASA: 3  Anesthesia type: general and total IV anesthesia          Induction: Intravenous  Anesthetic plan and risks discussed with: Patient

## 2017-04-24 NOTE — PROCEDURES
Colonoscopy Operative Report  [facility]  1980  [id number]      Procedure Type:   Colonoscopy --diagnostic     Indications:     Constipation    Pre-operative Diagnosis: see indication above    Post-operative Diagnosis:  See findings below    : Pelon Choi MD    Referring Provider: Juarez Powers    Sedation:  MAC anesthesia Propofol    Pre-Procedural Exam:      Airway: clear, Malimpati 2   Heart: RRR, without gallops or rubs  Lungs: clear bilaterally without wheezes, crackles, or rhonchi  Abdomen: soft, nontender, nondistended, bowel sounds present     Procedure Details:  After informed consent was obtained with all risks and benefits of procedure explained and preoperative exam completed, the patient was taken to the endoscopy suite and placed in the left lateral decubitus position. Upon sequential sedation as per above, a digital rectal exam was performed. The Olympus videocolonoscope [id number] was inserted in the rectum and carefully advanced to the cecum, which was identified by the ileocecal valve. The quality of preparation was good. The colonoscope was slowly withdrawn with careful evaluation between folds. Retoflexion in the rectum was completed. Findings/Impression: ANUS: Anal exam reveals no masses or hemorrhoids, sphincter tone is normal.   RECTUM: Rectal exam reveals no masses or hemorrhoids. SIGMOID COLON: The mucosa is normal with good vascular pattern and without ulcers, diverticula, and polyps. DESCENDING COLON: The mucosa is normal with good vascular pattern and without ulcers, diverticula, and polyps. SPLENIC FLEXURE: The splenic flexure is normal.   TRANSVERSE COLON: The mucosa is normal with good vascular pattern and without ulcers, diverticula, and polyps. HEPATIC FLEXURE: The hepatic flexure is normal.   ASCENDING COLON: The mucosa is normal with good vascular pattern and without ulcers, diverticula, and polyps.    CECUM: The appendiceal orifice appears normal. The ileocecal valve appears normal.   TERMINAL ILEUM: The terminal ileum was not entered. Specimen Removed:  none    Complications: None. EBL:  None. Recommendations: --Follow up with primary care physician. , -colonoscopy routine age 46 Regular diet. Resume normal medication(s). Discharge Disposition:  Home in the company of a  when able to ambulate.

## 2017-04-24 NOTE — IP AVS SNAPSHOT
[address] [facility] 
[phone] Patient: Maico Phillips MRN: [id number] :1980 You are allergic to the following No active allergies Recent Documentation Height Weight Breastfeeding? BMI OB Status Smoking Status 1.727 m 127.9 kg No 42.89 kg/m2 Having regular periods Former Smoker Emergency Contacts Name Discharge Info Relation Home Work Mobile Benitez Clement  Child [2] [phone] About your hospitalization You were admitted on:  2017 You last received care in the:  [facility] ENDOSCOPY You were discharged on:  2017 Unit phone number:  [phone] Why you were hospitalized Your primary diagnosis was:  Not on File Providers Seen During Your Hospitalizations Provider Role Specialty Primary office phone Shera Mcburney, MD Attending Provider Gastroenterology [phone] Your Primary Care Physician (PCP) Primary Care Physician Office Phone Office Fax Kathy Russo 316 [phone] Follow-up Information None Your Appointments Monday May 15, 2017  3:30 PM EDT NUTRITION COUNSELING with HANNAH Perez [facility] ([facility]) [address] [place] [address] [address] [place] [address]  
[phone] Current Discharge Medication List  
  
CONTINUE these medications which have NOT CHANGED Dose & Instructions Dispensing Information Comments Morning Noon Evening Bedtime Cholecalciferol (Vitamin D3) 50,000 unit Cap Your last dose was: Your next dose is: Take  by mouth every seven (7) days. Refills:  0  
     
   
   
   
  
 loratadine-pseudoephedrine  mg per tablet Commonly known as:  CLARITIN-D 24 HOUR Your last dose was: Your next dose is:    
   
   
 Dose:  1 Tab Take 1 Tab by mouth daily. Quantity:  10 Tab Refills:  0  
     
   
   
   
  
 metroNIDAZOLE 500 mg tablet Commonly known as:  FLAGYL Your last dose was: Your next dose is:    
   
   
 Dose:  500 mg Take 1 Tab by mouth three (3) times daily for 14 days. Quantity:  42 Tab Refills:  0 NEBULIZER Your last dose was: Your next dose is:    
   
   
 by Does Not Apply route as needed. Refills:  0  
     
   
   
   
  
 omeprazole 20 mg capsule Commonly known as:  PRILOSEC Your last dose was: Your next dose is:    
   
   
 Dose:  20 mg Take 20 mg by mouth daily. Refills:  0 PEPTO-BISMOL PO Your last dose was: Your next dose is: Take  by mouth two (2) times a day. Refills:  0 PROAIR HFA 90 mcg/actuation inhaler Generic drug:  albuterol Your last dose was: Your next dose is: Take  by inhalation. Refills:  0  
     
   
   
   
  
 VITAMIN B-12 PO Your last dose was: Your next dose is: Take  by mouth. Refills:  0  
     
   
   
   
  
 VITAMIN C PO Your last dose was: Your next dose is: Take  by mouth. Refills:  0 Discharge Instructions Geoff Taylor 
[id number] 
1980 COLON DISCHARGE INSTRUCTIONS Discomfort: 
Redness at IV site- apply warm compress to area; if redness or soreness persist- contact your physician There may be a slight amount of blood passed from the rectum Gaseous discomfort- walking, belching will help relieve any discomfort You may not operate a vehicle for 12 hours You may not engage in an occupation involving machinery or appliances for rest of today You may not drink alcoholic beverages for at least 12 hours Avoid making any critical decisions for at least 24 hour DIET: 
 Regular diet  however -  remember your colon is empty and a heavy meal will produce gas. ACTIVITY: It is recommended that you spend the remainder of the day resting -  avoid any strenuous activity. CALL M.D. ANY SIGN OF: Increasing pain, nausea, vomiting Abdominal distension (swelling) New increased bleeding (oral or rectal) Fever (chills) Follow-up Instructions: 
 Call Dr. Socorro Cain Biopsy results in 10 days Telephone # [phone] Brief Findings: reflux , normal colon DISCHARGE SUMMARY from Nurse The following personal items collected during your admission are returned to you:  
Dental Appliance: Dental Appliances: Partials, Uppers, Lowers, With patient Vision: Visual Aid: None Hearing Aid:   
Jewelry:   
Clothing:   
Other Valuables:   
Valuables sent to safe: [employer] Thank you for requesting access to [employer]. Please follow the instructions below to securely access and download your online medical record. [employer] allows you to send messages to your doctor, view your test results, renew your prescriptions, schedule appointments, and more. How Do I Sign Up? 1. In your internet browser, go to [email or web address] 
2. Click on the First Time User? Click Here link in the Sign In box. You will be redirect to the New Member Sign Up page. 3. Enter your [employer] Access Code exactly as it appears below. You will not need to use this code after youve completed the sign-up process. If you do not sign up before the expiration date, you must request a new code. [employer] Access Code: [email or web address] Expires: 2017 11:26 AM (This is the date your [employer] access code will ) 4. Enter the last four digits of your Social Security Number (xxxx) and Date of Birth (mm/dd/yyyy) as indicated and click Submit. You will be taken to the next sign-up page. 5. Create a [employer] ID.  This will be your [employer] login ID and cannot be changed, so think of one that is secure and easy to remember. 6. Create a [employer] ? password. You can change your password at any time. 7. Enter your Password Reset Question and Answer. This can be used at a later time if you forget your password. 8. Enter your e-mail address. You will receive e-mail notification when new information is available in [address] 9. Click Sign Up. You can now view and download portions of your medical record. 10. Click the Download Summary menu link to download a portable copy of your medical information. Additional Information If you have questions, please visit the Frequently Asked Questions section of the [employer] ? website at [email or web address] Remember, [employer] ? is NOT to be used for urgent needs. For medical emergencies, dial 911. Discharge Orders None Introducing [facility]! Fadumo Trevino introduces [employer] ? patient portal. Now you can access parts of your medical record, email your doctor's office, and request medication refills online. 1. In your internet browser, go to [email or web address] 2. Click on the First Time User? Click Here link in the Sign In box. You will see the New Member Sign Up page. 3. Enter your [employer] ? Access Code exactly as it appears below. You will not need to use this code after youve completed the sign-up process. If you do not sign up before the expiration date, you must request a new code. · [employer] ? Access Code: [email or web address] Expires: 5/6/2017 11:26 AM 
 
4. Enter the last four digits of your Social Security Number (xxxx) and Date of Birth (mm/dd/yyyy) as indicated and click Submit. You will be taken to the next sign-up page. 5. Create a [employer] ? ID. This will be your [employer] ? login ID and cannot be changed, so think of one that is secure and easy to remember. 6. Create a [employer] ? password. You can change your password at any time. 7. Enter your Password Reset Question and Answer. This can be used at a later time if you forget your password. 8. Enter your e-mail address. You will receive e-mail notification when new information is available in [address] 9. Click Sign Up. You can now view and download portions of your medical record. 10. Click the Download Summary menu link to download a portable copy of your medical information. If you have questions, please visit the Frequently Asked Questions section of the [employer] website. Remember, [employer] is NOT to be used for urgent needs. For medical emergencies, dial 911. Now available from your iPhone and Android! General Information Please provide this summary of care documentation to your next provider. Patient Signature:  ____________________________________________________________ Date:  ____________________________________________________________  
  
Wendie Ott Provider Signature:  ____________________________________________________________ Date:  ____________________________________________________________

## 2017-04-24 NOTE — H&P
Gastroenterology History and Physical    Patient: Yossi Jasso    Physician: Graciela Zurita MD    Vital Signs: Blood pressure 124/72, pulse 90, temperature 98.6 °F (37 °C), resp. rate 17, height 5' 8\" (1.727 m), weight 127.9 kg (282 lb 1 oz), last menstrual period 04/19/2017, SpO2 98 %, not currently breastfeeding. Allergies: No Known Allergies    Indication: gerd and constipation    History:  Past Medical History:   Diagnosis Date    Asthma     GERD (gastroesophageal reflux disease)     Hx gestational diabetes     Morbid obesity due to excess calories ([facility] [address] 11/17/2016    Trouble in sleeping     History reviewed. No pertinent surgical history. Social History     Social History    Marital status: SINGLE     Spouse name: N/A    Number of children: 2    Years of education: N/A     Occupational History    Residential Counselor      Social History Main Topics    Smoking status: Former Smoker     Packs/day: 0.25     Years: 15.00     Quit date: 4/21/2012    Smokeless tobacco: Never Used    Alcohol use Yes      Comment: occasionally    Drug use: No    Sexual activity: No     Other Topics Concern    None     Social History Narrative      Family History   Problem Relation Age of Onset    Cancer Mother     Diabetes Mother     Hypertension Mother        Medications:   Prior to Admission medications    Medication Sig Start Date End Date Taking? Authorizing Provider   BISMUTH SUBSALICYLATE (PEPTO-BISMOL PO) Take  by mouth two (2) times a day. Yes Historical Provider   albuterol (PROAIR HFA) 90 mcg/actuation inhaler Take  by inhalation. Yes Historical Provider   NEBULIZER by Does Not Apply route as needed. Yes Historical Provider   ASCORBATE CALCIUM (VITAMIN C PO) Take  by mouth. Yes Historical Provider   metroNIDAZOLE (FLAGYL) 500 mg tablet Take 1 Tab by mouth three (3) times daily for 14 days.  4/14/17 4/28/17 Yes Hannah Tyler NP   loratadine-pseudoephedrine (CLARITIN-D 24 HOUR)  mg per tablet Take 1 Tab by mouth daily. 2/5/17  Yes Daisy Hernandez PA-C   omeprazole (PRILOSEC) 20 mg capsule Take 20 mg by mouth daily. Yes Historical Provider   Cholecalciferol, Vitamin D3, 50,000 unit cap Take  by mouth every seven (7) days. Yes Historical Provider   CYANOCOBALAMIN, VITAMIN B-12, (VITAMIN B-12 PO) Take  by mouth. Historical Provider       Physical Exam:   HEENT: Head: Normocephalic, no lesions, without obvious abnormality.    Heart: regular rate and rhythm, S1, S2 normal, no murmur, click, rub or gallop   Lungs: chest clear, no wheezing, rales, normal symmetric air entry, Heart exam - S1, S2 normal, no murmur, no gallop, rate regular   Abdominal: Bowel sounds are normal, liver is not enlarged, spleen is not enlarged     Signed:  Cain Lopez MD 4/24/2017

## 2017-04-24 NOTE — DISCHARGE INSTRUCTIONS
Cony Schwab  [id number]  1980    COLON DISCHARGE INSTRUCTIONS  Discomfort:  Redness at IV site- apply warm compress to area; if redness or soreness persist- contact your physician  There may be a slight amount of blood passed from the rectum  Gaseous discomfort- walking, belching will help relieve any discomfort  You may not operate a vehicle for 12 hours  You may not engage in an occupation involving machinery or appliances for rest of today  You may not drink alcoholic beverages for at least 12 hours  Avoid making any critical decisions for at least 24 hour  DIET:   Regular diet    - however -  remember your colon is empty and a heavy meal will produce gas. ACTIVITY:  It is recommended that you spend the remainder of the day resting -  avoid any strenuous activity. CALL M.D. ANY SIGN OF:   Increasing pain, nausea, vomiting  Abdominal distension (swelling)  New increased bleeding (oral or rectal)  Fever (chills)    Follow-up Instructions:   Call Dr. Jefe Villa  Biopsy results in 10 days  Telephone # [phone]  Brief Findings: reflux , normal colon        DISCHARGE SUMMARY from Nurse    The following personal items collected during your admission are returned to you:   Dental Appliance: Dental Appliances: Partials, Uppers, Lowers, With patient  Vision: Visual Aid: None  Hearing Aid:    Jewelry:    Clothing:    Other Valuables:    Valuables sent to safe: [employer]    Thank you for requesting access to [employer]. Please follow the instructions below to securely access and download your online medical record. [employer] allows you to send messages to your doctor, view your test results, renew your prescriptions, schedule appointments, and more. How Do I Sign Up? 1. In your internet browser, go to [email or web address]  2. Click on the First Time User? Click Here link in the Sign In box. You will be redirect to the New Member Sign Up page.   3. Enter your [employer] Access Code exactly as it appears below. You will not need to use this code after youve completed the sign-up process. If you do not sign up before the expiration date, you must request a new code. [employer] Access Code: [email or web address]  Expires: 2017 11:26 AM (This is the date your [employer] access code will )    4. Enter the last four digits of your Social Security Number (xxxx) and Date of Birth (mm/dd/yyyy) as indicated and click Submit. You will be taken to the next sign-up page. 5. Create a [employer] ID. This will be your [employer] login ID and cannot be changed, so think of one that is secure and easy to remember. 6. Create a [employer] password. You can change your password at any time. 7. Enter your Password Reset Question and Answer. This can be used at a later time if you forget your password. 8. Enter your e-mail address. You will receive e-mail notification when new information is available in [address] 9. Click Sign Up. You can now view and download portions of your medical record. 10. Click the Download Summary menu link to download a portable copy of your medical information. Additional Information    If you have questions, please visit the Frequently Asked Questions section of the [employer] website at [email or web address] Remember, [employer] is NOT to be used for urgent needs. For medical emergencies, dial 911.

## 2017-04-24 NOTE — PROGRESS NOTES
Yesica Gusman  1980  [id number]    Situation:  Verbal report received from: DEONDRE Chan RN  Procedure: Procedure(s) with comments:  COLONOSCOPY  ESOPHAGOGASTRODUODENOSCOPY (EGD) - egd with bx  ESOPHAGOGASTRODUODENAL (EGD) BIOPSY    Background:    Preoperative diagnosis: ESOPHAGEAL REFLUX, CONSTIPATION  Postoperative diagnosis: normal upper exam, normal colon exam    :  Dr. Saba Avila  Assistant(s): Endoscopy RN-1: Annita Pugh RN  Endoscopy RN-2: Lars Brunner    Specimens:   ID Type Source Tests Collected by Time Destination   1 : bx Preservative Esophagus, Distal  Janna Mcrae MD 4/24/2017 1133 Pathology     H. Pylori  yes    Assessment:  Intra-procedure medications :  Propofol 510 mg      Anesthesia gave intra-procedure sedation and medications, see anesthesia flow sheet yes    Intravenous fluids: NS@ KVO     Vital signs stable     Abdominal assessment: round and soft     Recommendation:  Discharge patient per MD order. Family here.   Permission to share finding with family or friend yes

## 2017-04-24 NOTE — PROCEDURES
Endoscopic Gastroduodenoscopy Procedure Note  Maico Phillips  1980  [id number]      Procedure: Endoscopic Gastroduodenoscopy with biopsy, ANA test    Indication:  GERD    Pre-operative Diagnosis: see indication above    Post-operative Diagnosis: see findings below    :  Christal Salazar MD    Referring Provider:  Eros Samuel    Anethesia/Sedation:  MAC anesthesia Propofol    Pre-Procedural Exam:      Airway: clear, Malimpati 2   Heart: RRR, without gallops or rubs  Lungs: clear bilaterally without wheezes, crackles, or rhonchi  Abdomen: soft, nontender, nondistended, bowel sounds present        Procedure Details     After infom consent was obtained for the procedure, with all risks and benefits of procedure explained the patient was taken to the endoscopy suite and placed in the left lateral decubitus position. Following sequential administration of sedation as per above, the [id number] gastroscope was inserted into the mouth and advanced under direct vision to second portion of the duodenum. A careful inspection was made as the gastroscope was withdrawn, including a retroflexed view of the proximal stomach; findings and interventions are described below. Findings/Impression:ESOPHAGUS: The esophagus is normal. The proximal, mid, and distal portions are normal. Mild inflammation GE junction. STOMACH: The fundus on antegrade and retroflex views is normal. The body, antrum, and pylorus are normal.   DUODENUM: The bulb and second portions are normal.    Therapies:  biopsy of esophagus    Specimens: ana and GE junction          Complications:   None; patient tolerated the procedure well. EBL:  None. Recommendations:  -Await pathology. , -Await ANA test result and treat for Helicobacter pylori if positive.     Discharge Disposition:

## 2017-05-15 ENCOUNTER — CLINICAL SUPPORT (OUTPATIENT)
Dept: SURGERY | Age: 37
End: 2017-05-15

## 2017-05-15 DIAGNOSIS — E66.01 MORBID OBESITY WITH BMI OF 40.0-44.9, ADULT (HCC): Primary | ICD-10-CM

## 2017-05-16 VITALS — BODY MASS INDEX: 42.88 KG/M2 | WEIGHT: 282 LBS

## 2017-05-16 NOTE — PROGRESS NOTES
[address] [facility] [address]  Supervised Weight Loss     Date:   2017    Patient's Name: Michael Montejo  : 1980    Insurance:  Alma Rasmussen          Session:   Surgery: Sleeve Gastrectomy  Surgeon:  Suzanna Pete M.D. Height: 68\"  Weight:    282      Lbs. BMI: 42   Pounds Lost since last month: 0               Pounds Gained since last month: 1#    Starting Weight: 270#   Previous Months Weight: 281#  Overall Pounds Lost: 0  Overall Pounds Gained: 12#    Other Pertinent Information: Patient presents today for a family education session and supervised weight loss. Her two children (daughter 24, son 12) are present during the appointment to better understand the general nutrition guidelines and daily eating habits/behaviors in order to be able to better support their mother. This was recommended by the psychologist during the psychological evaluation. Smoking Status:  None   Alcohol Intake: none     I have reviewed with patient the guidelines of the supervised weight loss class. Patient understands the expectations of some weight loss during the weight loss trial.  Patient understands that weight gain could delay the process. I have also expressed to patient that classes need to be consecutive. Missing a class may subject patient to have to start their trial over. Patient has received this information in writing. Changes that patient has made since last month include:  Sipping fluids throughout the day,eating more green leafy veggies, chewing foods slowly and thoroughly. Eating Habits and Behaviors      Today we reviewed the general diet principles for weight loss surgery. An education lesson was provided regarding carbohydrates including which types of foods provide carbohydrates and the difference between complex carbohydrates and simple carbohydrates.  We discussed tips for reading food labels to identify added sugars for help with making healthier food choices. We discussed portion control before surgery using the balanced plate method (1/2 plate non-starchy vegetables, 1/4 coming from lean meat, and 1/4 of their plate coming from carbohydrates). We talked about the importance of measuring meals to 1/2 cup total after surgery and making at least half of the meal lean protein. Emphasis was placed on the importance of eating 3 meals a day and aiming for 60 grams of protein per day. I educated the patient on limiting liquid calories and drinking only calorie-free, sugar-free and non-carbonated beverages. We discussed the importance of drinking 64 ounces of fluid per day to prevent dehydration post-operatively. Patient's current diet habits include: eating 3 meals a day, snacking on apples with peanut butter, strawberries, grapes and bananas. Eating bread and rice 1-2 times per week. Otherwise limiting/avoiding refined carbohydrates and added sugars/sweets/desserts. Pt and daughter share grocery shopping and cooking. Eating mostly baked, grilled and broiled foods. Eating out is \"never\". Drinking 64 oz water daily and 2 fruit smoothies per week. Denies emotional and situational eating. Packing meals to take to work. Eating most meals at a table and taking 15 minutes to finish the meal. Reports late night eating and lack of activity are biggest challenges when trying to lose weight. Physical Activity/Exercise  During class we discussed the importance of increasing daily physical activity and beginning to develop an exercise regimen/routine. An education lesson was provided including exercise programs and resources, tips for getting started and overcoming barriers and health benefits of exercise. We discussed that exercise is an important part of long term weight loss. Comments:  During class, I discussed with patient the importance of getting into an exercise routine. Patient is currently [employer] two times per week for activity. Patient has been encouraged to increase cardio exercise and work up to 150 minutes per week at a minimum. Behavior Modification       Comments:  Patient was encouraged to keep a food journal and record what they were taking in daily. We discussed the importance of making certain behavior changes in order to be successful long term after weight loss surgery. Overall Assessment: Patient demonstrates understanding of basic nutrition guidelines for weight loss surgery. Despite reported lifestyle changes pt is still having difficulty with resuming weight loss. Have encouraged continued changes with eating habits and increased exercise to help promote weight loss. Will continue to assess as she works to complete supervised weight loss requirements. Goals:   1. Nutrition - eat 3 meals a day, lean protein, fruits/veggies, whole grains, low-fat dairy - portion control   2. Exercise - increase to 150 minutes per week   3.  Behavior -practice mindful eating behaviors, sipping fluids throughout the day     Uli Gallagher, [address]  5/16/2017

## 2017-09-18 ENCOUNTER — CLINICAL SUPPORT (OUTPATIENT)
Dept: SURGERY | Age: 37
End: 2017-09-18

## 2017-09-18 VITALS — BODY MASS INDEX: 44.4 KG/M2 | WEIGHT: 292 LBS

## 2017-09-18 DIAGNOSIS — E66.01 MORBID OBESITY WITH BMI OF 40.0-44.9, ADULT (HCC): Primary | ICD-10-CM

## 2017-09-18 NOTE — PROGRESS NOTES
[id number] [facility] Surgery at [address]  Supervised Weight Loss     Date:   2017    Patient's Name: Kevon Vaughn  : 1980    Insurance:  Estephania Bernard          Session: 7 of  6 (180 days completed)   Surgery: Sleeve Gastrectomy  Surgeon:  Libby Borges M.D. Height: 68\"   Weight:    292      Lbs. BMI: 44   Pounds Lost since last month: 10#               Pounds Gained since last month: 0    Starting Weight: 270#    Previous Months Weight: 282#  Overall Pounds Lost: 0   Overall Pounds Gained: 22#    Other Pertinent Information:none     Smoking Status:  None   Alcohol Intake: none     I have reviewed with patient the guidelines of the supervised weight loss class. Patient understands the expectations of some weight loss during the weight loss trial.  Patient understands that weight gain could delay the process. I have also expressed to patient that classes need to be consecutive. Missing a class may subject patient to have to start their trial over. Patient has received this information in writing. Changes that patient has made since last month include:  None reported. Eating Habits and Behaviors      Today we reviewed the general diet principles for weight loss surgery. A nutrition education lesson was provided specifically on portion control both before and after surgery. Their plate should be made up of 1/2 coming from non-starchy vegetables, 1/4 coming from lean meat, and 1/4 of their plate coming from carbohydrates, including fruits, starches, or milk. We discussed the importance of measuring meals to 1/2 cup (4 oz) after surgery to prevent overeating. Food models and measuring utensils were used to demonstrated portion control. Emphasis was placed on the importance of eating 3 meals a day and aiming for 60 grams of protein per day.  We talked about cooking more meals at home, less eating out, reduced intake of sweets and added sugars and drinking only calorie-free, non-carbonated fluids. Patient's current diet habits include: eating 3 meals a day. Eating oatmeal at breakfast with fruit, turkey and spinach sandwich at lunch and fish or chicken at dinner with veggies. Reports eating majority of meals at home but is still occasionally eating from convenient stores or on the go. She has stopped working her second job so that she can have more time to focus on her health. Is drinking mostly water and cranberry juice. Physical Activity/Exercise  During class we discussed the importance of increasing daily physical activity and beginning to develop an exercise regimen/routine. An education lesson was provided including exercise programs and resources, tips for getting started and overcoming barriers and health benefits of exercise. We discussed that exercise is an important part of long term weight loss. Comments:  During class, I discussed with patient the importance of getting into an exercise routine. Patient is currently playing basketball with her kids for activity. Patient has been encouraged to implement a more routine and consistent exercise regimen. Behavior Modification       Comments: We discussed the importance of eating mindfully after weight loss surgery to prevent food intolerance and prevent weight regain. We talked about how to eat more mindfully and identify emotional eating triggers. Tips and recommendations for how to make these changes were provided. Patient was encouraged to keep a food journal and record what they were taking in daily. Overall Assessment: Patient has \"gotten off track\" the past few months. She missed her last two appointments. Reports a high level of stress and her PCP thinks she is possibly depressed. She is being worked up by [employer] to determine if she has PCOS. Pt does not provide any insight into causes for 22# wt gain.  Overlooking lack of exercise and inconsistent changes with eating habits as possible influencing factors over wt gain. She is no longer working her second job which has allowed more time for her to focus on her eating habits and exercise. Pt needs to demonstrated better ability to be consistent with lifestlye changes and will need to demonstrate weight loss back down to starting weight. Goals:   1. Nutrition - eating 3 meals a day. Not skipping meals. 90% of meals from home   2. Exercise - routine/consisten, minimum 150 minutes per week of intentional exercise   3.  Behavior -more mindful of eating patterns; stress management     Jessica Radford, HANNAH  9/18/2017

## 2017-10-25 ENCOUNTER — CLINICAL SUPPORT (OUTPATIENT)
Dept: SURGERY | Age: 37
End: 2017-10-25

## 2017-10-25 DIAGNOSIS — E66.01 MORBID OBESITY WITH BMI OF 40.0-44.9, ADULT (HCC): Primary | ICD-10-CM

## 2017-10-25 DIAGNOSIS — K21.9 GASTROESOPHAGEAL REFLUX DISEASE WITHOUT ESOPHAGITIS: ICD-10-CM

## 2017-10-25 DIAGNOSIS — E66.01 MORBID OBESITY DUE TO EXCESS CALORIES (HCC): Primary | ICD-10-CM

## 2017-10-26 VITALS — WEIGHT: 288 LBS | BODY MASS INDEX: 43.79 KG/M2

## 2017-10-26 NOTE — PROGRESS NOTES
[facility] Surgery at [facility]  Supervised Weight Loss     Date:   10/26/2017    Patient's Name: Raven Tolbert  : 1980    Insurance:  [employer]           Session:   Surgery: Sleeve Gastrectomy  Surgeon:  Jadiel Randall M.D. Height: 68\"   Weight:    288      Lbs. BMI: 43   Pounds Lost since last month: 4#               Pounds Gained since last month: 0    Starting Weight: 270#   Previous Months Weight: 292#  Overall Pounds Lost: 0  Overall Pounds Gained: 18#    Other Pertinent Information: none    Smoking Status:  none  Alcohol Intake: none     I have reviewed with patient the guidelines of the supervised weight loss class. Patient understands the expectations of some weight loss during the weight loss trial.  Patient understands that weight gain could delay the process. I have also expressed to patient that classes need to be consecutive. Missing a class may subject patient to have to start their trial over. Patient has received this information in writing. Changes that patient has made since last month include:  \"detox flush\", only grilled foods, more veggies, walking 30 minutes. Eating Habits and Behaviors      Today we reviewed the general diet principles for weight loss surgery. Their plate should be made up of 1/2 coming from non-starchy vegetables, 1/4 coming from lean meat, and 1/4 of their plate coming from carbohydrates, including fruits, starches, or milk. We discussed the importance of measuring meals to 1/2 cup (4 oz) after surgery to prevent overeating. Emphasis was placed on the importance of eating 3 meals a day and aiming for 60 grams of protein per day. We talked about cooking more meals at home, less eating out, reduced intake of sweets and added sugars and drinking only calorie-free, non-carbonated fluids. Patient's current diet habits include: eating 3 meals a day. Snacking on fruit and nuts.  Avoiding refined carbohydrates and sweets/desserts. Eating baked, grilled and broiled foods. Eating out is never. Drinking 64 oz water daily. Sometimes emotional eating and reports reading, talking and coloring as ways to cope. Denies situational eating. Packing meals when away from home. Eating most meals at a table and takes 10-15 minutes to finish the meal. Reports lack of activity is biggest barrier to weight loss at this time. Physical Activity/Exercise  During class we discussed the importance of increasing daily physical activity and beginning to develop an exercise regimen/routine. An education lesson was provided including motivation to start exercise, exercise programs and resources, tips for getting started and overcoming barriers and health benefits of exercise. We discussed that exercise is an important part of long term weight loss. Comments:  During class, I discussed with patient the importance of getting into an exercise routine. Patient is currently walking 30 minutes daily for activity. Patient has been encouraged to maintain and increase as tolerated. Behavior Modification       Comments: We discussed the importance of eating mindfully after weight loss surgery to prevent food intolerance and prevent weight regain. We talked about how to eat more mindfully and identify emotional eating triggers. Tips and recommendations for how to make these changes were provided. Patient was encouraged to keep a food journal and record what they were taking in daily. Overall Assessment: Patient has demonstrated improvements this past month evidenced by reported changes and weight loss. Will continue to assess as pt works to complete supervised weight loss. Patient-Set Goals:   1. Nutrition - maintain green veggies and protein, baked and steamed foods, 64 oz water  2. Exercise - add more exercise in addition to walking  3.  Behavior -committing to goals     Jesus Torres, [address] [place] [address]  10/26/2017

## 2017-11-29 ENCOUNTER — CLINICAL SUPPORT (OUTPATIENT)
Dept: SURGERY | Age: 37
End: 2017-11-29

## 2017-11-29 VITALS — BODY MASS INDEX: 43.49 KG/M2 | WEIGHT: 286 LBS

## 2017-11-29 DIAGNOSIS — E66.01 MORBID OBESITY WITH BMI OF 40.0-44.9, ADULT (HCC): Primary | ICD-10-CM

## 2017-11-29 NOTE — PROGRESS NOTES
[address] [facility] Surgery at [facility]  Supervised Weight Loss     Date:   2017    Patient's Name: Nuris Bolanos  : 1980    Insurance:  [employer]          Session:   Surgery: Sleeve Gastrectomy  Surgeon:  Jones Renae M.D. Height: 68\"   Weight:    286      Lbs. BMI: 43   Pounds Lost since last month: 2#               Pounds Gained since last month: 0    Starting Weight: 270#   Previous Months Weight: 288#  Overall Pounds Lost: 0  Overall Pounds Gained: 16#    Other Pertinent Information: n/a     Smoking Status:  none  Alcohol Intake: none    I have reviewed with patient the guidelines of the supervised weight loss class. Patient understands the expectations of some weight loss during the weight loss trial.  Patient understands that weight gain could delay the process. I have also expressed to patient that classes need to be consecutive. Missing a class may subject patient to have to start their trial over. Patient has received this information in writing. Changes that patient has made since last month include:  More leafy green veggies, portion control, more active. Eating Habits and Behaviors      Today we reviewed the general diet principles for weight loss surgery. An education lesson was provided specific to carbohydrates. We discussed the difference between refined and complex carbohydrates, label reading to identify added sugars vs natural sugars and portion control of carbohydrates. Their plate should be made up of 1/2 coming from non-starchy vegetables, 1/4 coming from lean meat, and 1/4 of their plate coming from carbohydrates, including fruits, starches, or milk. We discussed the importance of measuring meals to 1/2 cup (4 oz) after surgery to prevent overeating. Emphasis was placed on the importance of eating 3 meals a day and aiming for 60 grams of protein per day.  We talked about cooking more meals at home, less eating out, reduced intake of sweets and added sugars and drinking only calorie-free, non-carbonated fluids. Patient's current diet habits include: eating 3 meals per day. Snacking on blueberries and raspberries. Avoiding refined carbohydrates, sweets, desserts/added sugars. Eating baked, broiled, grilled, stewed and steamed foods. Eating out is never. Drinking 64 oz water daily. Denies emotional and situational eating. Packing meals when away from home. Eating most meals while at a table or at work. Takes 10-15 minutes to finish the meal. Reports lack of activity and snacking are biggest barriers to weight loss at this time. Physical Activity/Exercise  During class we discussed the importance of increasing daily physical activity and beginning to develop an exercise regimen/routine. We discussed that exercise is an important part of long term weight loss. Comments:  During class, I discussed with patient the importance of getting into an exercise routine. Patient is currently walking and taking stairs for activity. Patient has been encouraged to maintain and increase to 150 minutes per week to promote weight loss. Behavior Modification       Comments: We discussed the importance of eating mindfully after weight loss surgery to prevent food intolerance and prevent weight regain. We talked about how to eat more mindfully and identify emotional eating triggers. Tips and recommendations for how to make these changes were provided. Patient was encouraged to keep a food journal and record what they were taking in daily. Overall Assessment: Patient has demonstrated appropriate lifestyle changes in preparation for weight loss surgery evidenced by weight loss the past 2 months and reported changes. Will continue to monitor as she works to resume starting weight     Patient-Set Goals:   1. Nutrition - continue to eat leafy green veggies, eliminate bad carbohydrates  2.  Exercise - walking for 1 hour, 3 times per week, gym 2 times per week  3.  Behavior -keep positive    Meena Rosenberg, RD  11/29/2017

## 2017-12-21 ENCOUNTER — CLINICAL SUPPORT (OUTPATIENT)
Dept: SURGERY | Age: 37
End: 2017-12-21

## 2017-12-21 DIAGNOSIS — E66.01 MORBID OBESITY WITH BMI OF 40.0-44.9, ADULT (HCC): Primary | ICD-10-CM

## 2017-12-22 VITALS — WEIGHT: 284 LBS | BODY MASS INDEX: 43.18 KG/M2

## 2017-12-22 NOTE — PROGRESS NOTES
[address] [facility] Surgery at [facility]  Supervised Weight Loss     Date:   2017    Patient's Name: Mary Torre  : 1980    Insurance:  [employer]          Session: 10 of  6  Surgery: Sleeve Gastrectomy  Surgeon:  Michael Castro M.D. Height: 68\"   Weight:    284      Lbs. BMI: 43   Pounds Lost since last month: 2#               Pounds Gained since last month: 0    Starting Weight: 270#   Previous Months Weight: 286#  Overall Pounds Lost: 0  Overall Pounds Gained: 14#    Other Pertinent Information: Patient has been steadily losing weight at rate of 2# per month for the past 2-3 months. Trying to get back down to her start weight of 270#. Smoking Status:  none  Alcohol Intake: none    I have reviewed with patient the guidelines of the supervised weight loss class. Patient understands the expectations of some weight loss during the weight loss trial.  Patient understands that weight gain could delay the process. I have also expressed to patient that classes need to be consecutive. Missing a class may subject patient to have to start their trial over. Patient has received this information in writing. Changes that patient has made since last month include:  Portion control, exercise. Eating Habits and Behaviors      Today we reviewed the general diet principles for weight loss surgery. An education lesson was provided specific to protein. We discussed why protein is important after surgery, how much protein is needed per day (60-80 grams) and how to achieve protein goals. We discussed various food sources of protein and how many grams of protein per serving. We discussed how to use protein supplements, powders and shakes and how to purchase these products. Emphasis was placed on the importance of eating 3 meals a day to help promote weight loss and achieve protein goals.  We talked about healthy methods of cooking and cooking tips to help with better tolerance of protein foods after surgery. Patient's current diet habits include: eating 3 meals a day. Snacking on grapefruit, grapes and veggies. Eating brown rice twice a week and otherwise limiting/avoiding refined carbohydrates and sweets or sweets. Eating baked, grilled, broiled and stewed foods. Eating out is never. Drinking 64 oz water and a fruit smoothie \"once in a while\". Denies emotional or situational eating. Packing meals when away from home. Eating most meals at a table and takes 15-20 minutes to finish the meal. Reports lack of activity is her biggest barrier to weight loss at this time. Physical Activity/Exercise  During class we discussed the importance of increasing daily physical activity and beginning to develop an exercise regimen/routine. We discussed that exercise is an important part of long term weight loss. Comments:  During class, I discussed with patient the importance of getting into an exercise routine. Patient is currently walking 45 minutes, 3 times per week for activity. Patient has been encouraged to maintain and increase to help promote more significant wt loss. Behavior Modification       Comments: We discussed the importance of eating mindfully after weight loss surgery to prevent food intolerance and prevent weight regain. We talked about how to eat more mindfully and identify emotional eating triggers. Tips and recommendations for how to make these changes were provided. Patient was encouraged to keep a food journal and record what they were taking in daily. Overall Assessment: Patient appears to be making appropriate lifestyle changes based on reported changes and answers to diet lifestyle questionnaire. Based on patient's responses it is difficult to assess the cause for significant wt gain during the program and reason for slow rate of wt loss of 2# per month. Will continue monitor as patient works towards weight loss back down to her starting weight. Patient-Set Goals:   1. Nutrition - start eating protein first at the meal  2. Exercise - walk for 1 hour, 4 times per week  3.  Behavior - be consistent     Rolly Torrez, HANNAH  12/22/2017

## 2018-01-30 ENCOUNTER — CLINICAL SUPPORT (OUTPATIENT)
Dept: SURGERY | Age: 38
End: 2018-01-30

## 2018-01-30 DIAGNOSIS — E66.01 MORBID OBESITY WITH BMI OF 40.0-44.9, ADULT (HCC): Primary | ICD-10-CM

## 2018-01-31 VITALS — BODY MASS INDEX: 41.21 KG/M2 | WEIGHT: 271 LBS

## 2018-01-31 NOTE — PROGRESS NOTES
[address] [facility] Surgery at [facility]  Supervised Weight Loss     Date:   2018    Patient's Name: Rylie Yarbrough  : 1980    Insurance:  [employer]ol[employer] Shah[employer]          Session:   Surgery: Sleeve Gastrectomy  Surgeon:  Lashell Schafer M.D. Height: 68\"   Weight:    271      Lbs. BMI: 41   Pounds Lost since last month: 13#               Pounds Gained since last month: 0    Starting Weight: 270#   Previous Months Weight: 284#  Overall Pounds Lost: 0  Overall Pounds Gained: 1#    Other Pertinent Information: Patient has achieved significant wt loss the past 2-3 months demonstrated increased compliance with general nutrition guidelines. Smoking Status:  none  Alcohol Intake: none    I have reviewed with patient the guidelines of the supervised weight loss class. Patient understands the expectations of some weight loss during the weight loss trial.  Patient understands that weight gain could delay the process. I have also expressed to patient that classes need to be consecutive. Missing a class may subject patient to have to start their trial over. Patient has received this information in writing. Changes that patient has made since last month include:  2 hours of exercise 4 times per week, no junk food, more fruits and veggies and protein. Eating Habits and Behaviors  Today we reviewed the general diet principles for weight loss surgery. Their plate should be made up of 1/2 coming from non-starchy vegetables, 1/4 coming from lean meat, and 1/4 of their plate coming from carbohydrates, including fruits, starches, or milk. Emphasis was placed on the importance of eating 3 meals a day and aiming for 60 grams of protein per day. I educated the patient on limiting liquid calories and drinking only calorie-free, sugar-free and non-carbonated beverages. We discussed the importance of drinking 64 ounces of fluid per day to prevent dehydration post-operatively.  A nutrition education lesson regarding vitamin and mineral supplements was provided and the importance of preventing nutrient deficiencies post-operatively. Patient's current diet habits include: eating 3 meals a day. Snacking on fruit and veggies. Avoiding refined carbohydrates, sweets and desserts. Eating out is never. Eating baked,grilled and broiled foods. Drinking 64 oz water daily and no other fluids. Denies emotional and situational eating. Packing meals when away from home. Eating most meals at a table and takes 20 minutes to finish the meal. Reports nothing at this time is preventing her from weight loss. Physical Activity/Exercise  During class we discussed the importance of increasing daily physical activity and beginning to develop an exercise regimen/routine. We discussed that exercise is an important part of long term weight loss. Comments:  During class, I discussed with patient the importance of getting into an exercise routine. Patient is currently working out 2 hours, 4 times per week for activity. Patient has been encouraged to maintain and increase as tolerated. Behavior Modification       Comments: We discussed the importance of eating mindfully after weight loss surgery to prevent food intolerance and prevent weight regain. We talked about how to eat more mindfully and identify emotional eating triggers. Tips and recommendations for how to make these changes were provided. Patient was encouraged to keep a food journal and record what they were taking in daily. Overall Assessment: Patient demonstrates appropriate lifestyle changes in preparation for weight loss surgery. Will continue to assess as she works to get her weight back to starting weight or below in the next 2 weeks. Will provide final evaluation/assessment at that time. Patient-Set Goals:   1. Nutrition - continue to eat healthy - planning ahead, portion control, listening to body   2.  Exercise - continue to exercise - change routine  3.  Behavior - keep positive - keep positive people around me     Payal Noriega, RD  1/31/2018

## 2018-02-14 ENCOUNTER — CLINICAL SUPPORT (OUTPATIENT)
Dept: SURGERY | Age: 38
End: 2018-02-14

## 2018-02-14 VITALS — WEIGHT: 268 LBS | BODY MASS INDEX: 40.75 KG/M2

## 2018-02-14 DIAGNOSIS — E66.01 MORBID OBESITY WITH BMI OF 40.0-44.9, ADULT (HCC): Primary | ICD-10-CM

## 2018-02-14 NOTE — PROGRESS NOTES
[address] [facility] at [facility]  Supervised Weight Loss     Date:   2018    Patient's Name: Yovany Liang  : 1980    Insurance:  Deng Freeman          Session:   Surgery: Sleeve Gastrectomy  Surgeon:  Javier Ling M.D. Height: 68\"   Weight:    268      Lbs. BMI: 40   Pounds Lost since last month: 3#               Pounds Gained since last month: 0    Starting Weight: 270#   Previous Months Weight: 271#  Overall Pounds Lost: 2#  Overall Pounds Gained: 0    Other Pertinent Information: n/a     Smoking Status:  Reports none   Alcohol Intake: none    I have reviewed with patient the guidelines of the supervised weight loss class. Patient understands the expectations of some weight loss during the weight loss trial.  Patient understands that weight gain could delay the process. I have also expressed to patient that classes need to be consecutive. Missing a class may subject patient to have to start their trial over. Patient has received this information in writing. Changes that patient has made since last month include:  Continued portion control, continued exercise, continued listening to fullness cues. Eating Habits and Behaviors  A nutrition and behavior education less and activity was completed by the patient specific to mindful eating behaviors, emotional eating and developing non-food coping strategies for emotional eating. We also reviewed the general diet principles for weight loss surgery. Their plate should be made up of 1/2 coming from non-starchy vegetables, 1/4 coming from lean meat, and 1/4 of their plate coming from carbohydrates, including fruits, starches, or milk. Emphasis was placed on the importance of eating 3 meals a day and aiming for 60 grams of protein per day. I educated the patient on limiting liquid calories and drinking only calorie-free, sugar-free and non-carbonated beverages.  We discussed the importance of drinking 64 ounces of fluid per day to prevent dehydration post-operatively. Patient's current diet habits include: eating 3 meals a day. Snacking on fruit. Avoiding refined carbohydrates,sweets, desserts. Eating baked, grilled and broiled foods. Eating out is never. Drinking 64 oz water daily. Denies emotional and situational eating. Packing meals when away from home. Eating most meals at a table and takes 15-20 minutes to finish the meal. Reports food choices are only barrier to weight loss at this time. Physical Activity/Exercise  During class we discussed the importance of increasing daily physical activity and beginning to develop an exercise regimen/routine. We discussed that exercise is an important part of long term weight loss. Comments:  During class, I discussed with patient the importance of getting into an exercise routine. Patient is currently exercising 5 times per week for activity. Patient has been encouraged to maintain and increase as tolerated. Behavior Modification       Comments: We discussed the importance of eating mindfully after weight loss surgery to prevent food intolerance and prevent weight regain. We talked about how to eat more mindfully and identify emotional eating triggers. Tips and recommendations for how to make these changes were provided. Patient was encouraged to keep a food journal and record what they were taking in daily. Overall Assessment: Patient has demonstrated appropriate lifestyle changes in the past 3-4 months evidenced by weight loss and reported changes. Had previously gained weight during the program and was difficult to assess if medically related or influenced by lifestyle and eating habits. Overall she has demonstrated more consistent changes in the past several months and weight loss as a result of those changes.  Pt has also demonstrated a good understanding of the general nutrition guidelines for after surgery and appears to be an appropriate candidate at this time. Patient-Set Goals:   1. Nutrition - continue portion control, continue to be mindful, continue healthy food choices  2. Exercise - continue 5 times per week  3.  Behavior -continue to follow all goals     Carmen Jack, [address]  2/14/2018

## 2018-02-23 ENCOUNTER — OFFICE VISIT (OUTPATIENT)
Dept: SURGERY | Age: 38
End: 2018-02-23

## 2018-02-23 VITALS
HEIGHT: 68 IN | RESPIRATION RATE: 18 BRPM | DIASTOLIC BLOOD PRESSURE: 90 MMHG | WEIGHT: 269.6 LBS | BODY MASS INDEX: 40.86 KG/M2 | SYSTOLIC BLOOD PRESSURE: 130 MMHG | OXYGEN SATURATION: 97 % | HEART RATE: 96 BPM | TEMPERATURE: 98.6 F

## 2018-02-23 DIAGNOSIS — E66.01 MORBID OBESITY WITH BMI OF 40.0-44.9, ADULT (HCC): Primary | ICD-10-CM

## 2018-02-23 DIAGNOSIS — K21.9 GASTROESOPHAGEAL REFLUX DISEASE WITHOUT ESOPHAGITIS: ICD-10-CM

## 2018-02-23 DIAGNOSIS — E66.01 MORBID OBESITY DUE TO EXCESS CALORIES (HCC): Primary | ICD-10-CM

## 2018-02-23 DIAGNOSIS — J45.909 UNCOMPLICATED ASTHMA, UNSPECIFIED ASTHMA SEVERITY, UNSPECIFIED WHETHER PERSISTENT: ICD-10-CM

## 2018-02-23 DIAGNOSIS — Z79.899 USE OF PROTON PUMP INHIBITOR THERAPY: ICD-10-CM

## 2018-02-23 NOTE — MR AVS SNAPSHOT
[address] [facility] [address] 
[phone] Patient: Alem Martin MRN: [id number] :1980 Visit Information Date & Time Provider Department Dept. Phone Encounter #  
 2018 10:20 AM Corby Ruiz MD [facility] 22 [phone] [id number] Upcoming Health Maintenance Date Due Pneumococcal 19-64 Medium Risk (1 of 1 - PPSV23) 1997 PAP AKA CERVICAL CYTOLOGY 1999 Influenza Age 5 to Adult 2017 DTaP/Tdap/Td series (2 - Td) 3/12/2027 Allergies as of 2018  Review Complete On: 2018 By: Corby Ruiz MD  
 No Known Allergies Current Immunizations  Never Reviewed Name Date Tdap 3/12/2017  5:49 PM  
  
 Not reviewed this visit You Were Diagnosed With   
  
 Codes Comments Morbid obesity with BMI of 40.0-44.9, adult ([facility]    -  Primary ICD-10-CM: E66.01, Z68.41 
ICD-9-CM: 278.01, V85.41 Gastroesophageal reflux disease without esophagitis     ICD-10-CM: K21.9 ICD-9-CM: 530.81 Use of proton pump inhibitor therapy     ICD-10-CM: Z79.899 ICD-9-CM: V58.69 Vitals BP Pulse Temp Resp Height(growth percentile) Weight(growth percentile) 130/90 96 98.6 °F (37 °C) (Oral) 18 5' 8\" (1.727 m) 269 lb 9.6 oz (122.3 kg) SpO2 BMI OB Status Smoking Status 97% 40.99 kg/m2 Having regular periods Former Smoker Vitals History BMI and BSA Data Body Mass Index Body Surface Area 40.99 kg/m 2 2.42 m 2 Preferred Pharmacy Pharmacy Name Phone [address], [address] [facility] [phone] Your Updated Medication List  
  
   
This list is accurate as of 18 11:09 AM.  Always use your most recent med list.  
  
  
  
  
 Cholecalciferol (Vitamin D3) 50,000 unit Cap Take  by mouth every seven (7) days. loratadine-pseudoephedrine  mg per tablet Commonly known as:  CLARITIN-D 24 HOUR Take 1 Tab by mouth daily. NEBULIZER  
by Does Not Apply route as needed. omeprazole 20 mg capsule Commonly known as:  PRILOSEC Take 20 mg by mouth daily. PEPTO-BISMOL PO Take  by mouth two (2) times a day. PROAIR HFA 90 mcg/actuation inhaler Generic drug:  albuterol Take  by inhalation. VITAMIN B-12 PO Take  by mouth. VITAMIN C PO Take  by mouth. Introducing [address]! Aguilar Torres introduces Wantr patient portal. Now you can access parts of your medical record, email your doctor's office, and request medication refills online. 1. In your internet browser, go to [email or web address] 2. Click on the First Time User? Click Here link in the Sign In box. You will see the New Member Sign Up page. 3. Enter your Wantr Access Code exactly as it appears below. You will not need to use this code after youve completed the sign-up process. If you do not sign up before the expiration date, you must request a new code. · Wantr Access Code: [email or web address] Expires: 5/24/2018 11:09 AM 
 
4. Enter the last four digits of your Social Security Number (xxxx) and Date of Birth (mm/dd/yyyy) as indicated and click Submit. You will be taken to the next sign-up page. 5. Create a Wantr ID. This will be your Wantr login ID and cannot be changed, so think of one that is secure and easy to remember. 6. Create a Wantr password. You can change your password at any time. 7. Enter your Password Reset Question and Answer. This can be used at a later time if you forget your password. 8. Enter your e-mail address. You will receive e-mail notification when new information is available in [address] 9. Click Sign Up. You can now view and download portions of your medical record. 10. Click the Download Summary menu link to download a portable copy of your medical information. If you have questions, please visit the Frequently Asked Questions section of the [employer] website. Remember, [employer] is NOT to be used for urgent needs. For medical emergencies, dial 911. Now available from your iPhone and Android! Please provide this summary of care documentation to your next provider. Your primary care clinician is listed as Ingris Zuniga. If you have any questions after today's visit, please call [phone].

## 2018-02-23 NOTE — PROGRESS NOTES
1. Have you been to the ER, urgent care clinic since your last visit? Hospitalized since your last visit? No    2. Have you seen or consulted any other health care providers outside of the [address] [place] [address] since your last visit? Include any pap smears or colon screening.  No

## 2018-02-23 NOTE — PROGRESS NOTES
Office Visit    Arslan Carbone a history of morbid obesity with multiple comorbidities. Body mass index is 40.99 kg/(m^2). Ap Dye She has failed multiple attempts at weight loss. She has been approved for laparoscopic vertical sleeve gastrectomy. Starting weight was 270 lbs with 1 lb lost to date since beginning the bariatric process. The patient has attended the pre-op educational classes. She has had an opportunity to read our consent forms. I answered her questions regarding the surgery. I reviewed the role for the restrictive nature of this procedure as a tool to help her achieve her  weight loss goals. I reiterated the need for adherence to lifelong changes in dietary choices, eating habits and exercise to maintain the optimal outcome. Patient has completed 12 out of 6 required visited with the dietician. Juliana Huang RD, remarks \"Overall she has demonstrated more consistent changes in the past several months and weight loss as a result of those changes. Pt has also demonstrated a good understanding of the general nutrition guidelines for after surgery and appears to be an appropriate candidate at this time. \"    She continues to take Omeprazole daily for GERD. Non-compliance occasionally causes symptomatic presentations. Required imaging & labs:     Nov 28, 2016   Value      Ref. Range   Units   TSH 1.940  0.450 - 4.500 uIU/mL       H-Pylori Breath Test    Nov 29th, 2017 -- Negative   Chest X-ray    Feb 7th, 2018 -- No acute process. No change from the prior study. EGD & Colonoscopy April 24, 2017:   Colonoscopy   ANUS: Anal exam reveals no masses or hemorrhoids, sphincter tone is normal.   RECTUM: Rectal exam reveals no masses or hemorrhoids. SIGMOID COLON: The mucosa is normal with good vascular pattern and without ulcers, diverticula, and polyps. DESCENDING COLON: The mucosa is normal with good vascular pattern and without ulcers, diverticula, and polyps.    SPLENIC FLEXURE: The splenic flexure is normal.   TRANSVERSE COLON: The mucosa is normal with good vascular pattern and without ulcers, diverticula, and polyps. HEPATIC FLEXURE: The hepatic flexure is normal.   ASCENDING COLON: The mucosa is normal with good vascular pattern and without ulcers, diverticula, and polyps. CECUM: The appendiceal orifice appears normal. The ileocecal valve appears normal.   TERMINAL ILEUM: The terminal ileum was not entered. Endoscopy --    ESOPHAGUS: The esophagus is normal. The proximal, mid, and distal portions are normal. Mild inflammation GE junction. STOMACH: The fundus on antegrade and retroflex views is normal. The body, antrum, and pylorus are normal.   DUODENUM: The bulb and second portions are normal.            PHYSICAL EXAM: deferred    ASSESSMENT: History of morbid obesity with multiple co-morbidities (including GERD and asthma). The patient meets NIH criteria as a candidate for weight loss surgery. She is an appropriate candidate for laparoscopic vertical sleeve gastrectomy. She has read our consent form and has signed indicating that she understands the risks, accepts and wishes to proceed. Diagnoses and all orders for this visit:    1. Morbid obesity with BMI of 40.0-44.9, adult ([facility]    2. Gastroesophageal reflux disease without esophagitis    3. Use of proton pump inhibitor therapy          PLAN:  1. Laparoscopic vertical sleeve gastrectomy with upper endoscopy, possible conversion to open procedure, possible liver biopsy. 2. Previously on 6 month trial course on APAP for home-use d/t suspected borderline sleep apnea. Upon conclusion, sleep medicine specialist informed the patient that she no longer needs to continue APAP therapy. 10:45 AM - 10:58 AM   Total face to face time with patient: 13 minutes. Greater than 50% of the time was spent in counseling.      Signed By: Nicola Rodas MD     February 23, 2018           Written by Shani Barros, as dictated by Albina Bueno MD.

## 2018-02-27 ENCOUNTER — HOSPITAL ENCOUNTER (OUTPATIENT)
Dept: GENERAL RADIOLOGY | Age: 38
Discharge: HOME | End: 2018-02-27
Payer: COMMERCIAL

## 2018-02-27 DIAGNOSIS — J45.909 UNCOMPLICATED ASTHMA, UNSPECIFIED ASTHMA SEVERITY, UNSPECIFIED WHETHER PERSISTENT: ICD-10-CM

## 2018-02-27 DIAGNOSIS — E66.01 MORBID OBESITY DUE TO EXCESS CALORIES (HCC): ICD-10-CM

## 2018-02-27 DIAGNOSIS — K21.9 GASTROESOPHAGEAL REFLUX DISEASE WITHOUT ESOPHAGITIS: ICD-10-CM

## 2018-02-27 PROCEDURE — 71046 X-RAY EXAM CHEST 2 VIEWS: CPT

## 2018-02-28 LAB — TSH SERPL DL<=0.005 MIU/L-ACNC: 1.94 UIU/ML (ref 0.45–4.5)

## 2018-03-30 ENCOUNTER — OFFICE VISIT (OUTPATIENT)
Dept: SURGERY | Age: 38
End: 2018-03-30

## 2018-03-30 VITALS
HEART RATE: 99 BPM | WEIGHT: 274 LBS | BODY MASS INDEX: 41.52 KG/M2 | HEIGHT: 68 IN | DIASTOLIC BLOOD PRESSURE: 78 MMHG | TEMPERATURE: 98.1 F | OXYGEN SATURATION: 96 % | SYSTOLIC BLOOD PRESSURE: 141 MMHG

## 2018-03-30 DIAGNOSIS — E66.01 MORBID OBESITY DUE TO EXCESS CALORIES (HCC): Primary | ICD-10-CM

## 2018-03-30 DIAGNOSIS — Z01.818 PREOP GENERAL PHYSICAL EXAM: ICD-10-CM

## 2018-03-30 DIAGNOSIS — K21.9 GASTROESOPHAGEAL REFLUX DISEASE WITHOUT ESOPHAGITIS: ICD-10-CM

## 2018-03-30 RX ORDER — HYOSCYAMINE SULFATE 0.12 MG/1
0.12 TABLET SUBLINGUAL
Qty: 30 TAB | Refills: 0 | Status: SHIPPED | OUTPATIENT
Start: 2018-03-30 | End: 2018-04-02

## 2018-03-30 RX ORDER — ONDANSETRON 4 MG/1
4 TABLET, ORALLY DISINTEGRATING ORAL
Qty: 30 TAB | Refills: 0 | Status: SHIPPED | OUTPATIENT
Start: 2018-03-30 | End: 2018-04-02

## 2018-03-30 NOTE — MR AVS SNAPSHOT
[address] [facility] [place] 7 [id number] [phone] Patient: María Ngo MRN: [id number] :1980 Visit Information Date & Time Provider Department Dept. Phone Encounter #  
 3/30/2018 11:00 AM Ed Greene NP [facility] 22 [phone] [id number] Your Appointments 2018  1:20 PM  
ESTABLISHED PATIENT with Cinthia Snyder MD  
[address] [facility] 263 ([facility]) Appt Note: sign consents for LSG 18; PATs this am; HP done 3/30/18  
 [facility] [id number]  
[address] [place] [address] Upcoming Health Maintenance Date Due Pneumococcal 19-64 Medium Risk (1 of 1 - PPSV23) 1997 PAP AKA CERVICAL CYTOLOGY 1999 Influenza Age 5 to Adult 2017 DTaP/Tdap/Td series (2 - Td) 3/12/2027 Allergies as of 3/30/2018  Review Complete On: 3/30/2018 By: Ed Greene NP No Known Allergies Current Immunizations  Never Reviewed Name Date Tdap 3/12/2017  5:49 PM  
  
 Not reviewed this visit You Were Diagnosed With   
  
 Codes Comments Morbid obesity due to excess calories ([facility]    -  Primary ICD-10-CM: E66.01 
ICD-9-CM: 278.01 Preop general physical exam     ICD-10-CM: I84.167 ICD-9-CM: V72.83 Gastroesophageal reflux disease without esophagitis     ICD-10-CM: K21.9 ICD-9-CM: 530.81 BMI 40.0-44.9, adult [facility])     ICD-10-CM: Z68.41 
ICD-9-CM: V85.41 Vitals BP Pulse Temp Height(growth percentile) Weight(growth percentile) SpO2  
 141/78 (BP 1 Location: Left arm, BP Patient Position: Sitting) 99 98.1 °F (36.7 °C) (Oral) 5' 8\" (1.727 m) 274 lb (124.3 kg) 96% BMI OB Status Smoking Status 41.66 kg/m2 Having regular periods Former Smoker Vitals History BMI and BSA Data Body Mass Index Body Surface Area 41.66 kg/m 2 2.44 m 2 Preferred Pharmacy Pharmacy Name Phone [facility] [address] [facility], [address] [phone] Your Updated Medication List  
  
   
This list is accurate as of 3/30/18 11:40 AM.  Always use your most recent med list.  
  
  
  
  
 Cholecalciferol (Vitamin D3) 50,000 unit Cap Take  by mouth every seven (7) days. hyoscyamine SL 0.125 mg SL tablet Commonly known as:  LEVSIN/SL  
1 Tab by SubLINGual route every four (4) hours as needed for Cramping.  
  
 loratadine-pseudoephedrine  mg per tablet Commonly known as:  CLARITIN-D 24 HOUR Take 1 Tab by mouth daily. MULTIVITAMIN PO Take  by mouth. NEBULIZER  
by Does Not Apply route as needed. omeprazole 20 mg capsule Commonly known as:  PRILOSEC Take 20 mg by mouth daily. ondansetron 4 mg disintegrating tablet Commonly known as:  ZOFRAN ODT Take 1 Tab by mouth every eight (8) hours as needed for Nausea. PEPTO-BISMOL PO Take  by mouth two (2) times a day. PROAIR HFA 90 mcg/actuation inhaler Generic drug:  albuterol Take  by inhalation. VITAMIN B-12 PO Take  by mouth. VITAMIN C PO Take  by mouth. Prescriptions Sent to Pharmacy Refills  
 hyoscyamine SL (LEVSIN/SL) 0.125 mg SL tablet 0 Si Tab by SubLINGual route every four (4) hours as needed for Cramping. Class: Normal  
 Pharmacy: [facility], [address] AT [address] [place] [address] Ph #: [phone] Route: SubLINGual  
 ondansetron (ZOFRAN ODT) 4 mg disintegrating tablet 0 Sig: Take 1 Tab by mouth every eight (8) hours as needed for Nausea. Class: Normal  
 Pharmacy: [facility], [address] AT [address] [place] [address] Ph #: [phone]  Route: Oral  
  
To-Do List   
 2018 9:30 AM  
 Appointment with [facility] PAT EXAM RM 5 at [address] ([phone]) Patient Instructions Sleeve Gastrectomy: Before Your Surgery What is a sleeve gastrectomy? Sleeve gastrectomy is surgery to remove part of the stomach. It helps with weight loss. The surgery limits the amount of food your stomach can hold. This can help you eat less and feel full sooner. The surgery is usually done through several small cuts in the belly. These cuts are called incisions. The doctor will place small surgical tools and a camera, called a laparoscope, through the incisions. The doctor will separate the upper part of your stomach from the rest of your stomach. This forms a small pouch. The pouch will hold the food you eat. The doctor will close the cuts in your belly with stitches or surgical staples. These will be removed 7 to 10 days after surgery, unless your doctor uses stitches that dissolve. The incisions will leave scars that fade with time. Most people go home about 2 days after surgery. You will probably need to take 2 to 4 weeks off from work. It depends on the type of work you do and how you feel. Follow-up care is a key part of your treatment and safety. Be sure to make and go to all appointments, and call your doctor if you are having problems. It's also a good idea to know your test results and keep a list of the medicines you take. What happens before surgery? ?Surgery can be stressful. This information will help you understand what you can expect. And it will help you safely prepare for surgery. ? Preparing for surgery ? · Understand exactly what surgery is planned, along with the risks, benefits, and other options. · Tell your doctors ALL the medicines, vitamins, supplements, and herbal remedies you take. Some of these can increase the risk of bleeding or interact with anesthesia. ?  · If you take blood thinners, such as warfarin (Coumadin), clopidogrel (Plavix), or aspirin, be sure to talk to your doctor. He or she will tell you if you should stop taking these medicines before your surgery. Make sure that you understand exactly what your doctor wants you to do.  
? · Your doctor will tell you which medicines to take or stop before your surgery. You may need to stop taking certain medicines a week or more before surgery. So talk to your doctor as soon as you can.  
? · If you have an advance directive, let your doctor know. It may include a living will and a durable power of  for health care. Bring a copy to the hospital. If you don't have one, you may want to prepare one. It lets your doctor and loved ones know your health care wishes. Doctors advise that everyone prepare these papers before any type of surgery or procedure. What happens on the day of surgery? · Follow the instructions exactly about when to stop eating and drinking. If you don't, your surgery may be canceled. If your doctor told you to take your medicines on the day of surgery, take them with only a sip of water. ? · Take a bath or shower before you come in for your surgery. Do not apply lotions, perfumes, deodorants, or nail polish. ? · Do not shave the surgical site yourself. ? · Take off all jewelry and piercings. And take out contact lenses, if you wear them. ? At the hospital or surgery center · Bring a picture ID. ? · The area for surgery is often marked to make sure there are no errors. ? · You will be kept comfortable and safe by your anesthesia provider. You will be asleep during the surgery. ? · The surgery will take about 1 to 3 hours. ? · After surgery, the bowel usually \"rests\" for a few days before it starts working again. You may have a thin plastic tube in your nose that goes into your stomach. This tube drains stomach juices and prevents nausea.  The drainage usually looks green, brown, or even black with flecks of blood. The tube will be removed in a few days, after your bowels start working again. After the tube is removed, you can start drinking and eating again. Going home · Be sure you have someone to drive you home. Anesthesia and pain medicine make it unsafe for you to drive. ? · You will be given more specific instructions about recovering from your surgery. They will cover things like diet, wound care, follow-up care, driving, and getting back to your normal routine. When should you call your doctor? · You have questions or concerns. ? · You don't understand how to prepare for your surgery. ? · You become ill before the surgery (such as fever, flu, or a cold). ? · You need to reschedule or have changed your mind about having the surgery. Where can you learn more? Go to http://yulisa-willian.info/. Enter R682 in the search box to learn more about \"Sleeve Gastrectomy: Before Your Surgery. \" Current as of: October 13, 2016 Content Version: 11.4 © 2128-9358 [employer]. Care instructions adapted under license by [employer] (which disclaims liability or warranty for this information). If you have questions about a medical condition or this instruction, always ask your healthcare professional. [place] [address] any warranty or liability for your use of this information. Introducing [facility]! Haritha Abel introduces [employer] patient portal. Now you can access parts of your medical record, email your doctor's office, and request medication refills online. 1. In your internet browser, go to [email or web address] 2. Click on the First Time User? Click Here link in the Sign In box. You will see the New Member Sign Up page. 3. Enter your [employer] Access Code exactly as it appears below. You will not need to use this code after youve completed the sign-up process.  If you do not sign up before the expiration date, you must request a new code. · [employer] Access Code: [email or web address] Expires: 5/24/2018 12:09 PM 
 
4. Enter the last four digits of your Social Security Number (xxxx) and Date of Birth (mm/dd/yyyy) as indicated and click Submit. You will be taken to the next sign-up page. 5. Create a [employer] ID. This will be your [employer] login ID and cannot be changed, so think of one that is secure and easy to remember. 6. Create a [employer] password. You can change your password at any time. 7. Enter your Password Reset Question and Answer. This can be used at a later time if you forget your password. 8. Enter your e-mail address. You will receive e-mail notification when new information is available in [address] 9. Click Sign Up. You can now view and download portions of your medical record. 10. Click the Download Summary menu link to download a portable copy of your medical information. If you have questions, please visit the Frequently Asked Questions section of the [employer] website. Remember, [employer] is NOT to be used for urgent needs. For medical emergencies, dial 911. Now available from your iPhone and Android! Please provide this summary of care documentation to your next provider. Your primary care clinician is listed as Griffin Rai. If you have any questions after today's visit, please call [phone].

## 2018-03-30 NOTE — PATIENT INSTRUCTIONS
Sleeve Gastrectomy: Before Your Surgery  What is a sleeve gastrectomy? Sleeve gastrectomy is surgery to remove part of the stomach. It helps with weight loss. The surgery limits the amount of food your stomach can hold. This can help you eat less and feel full sooner. The surgery is usually done through several small cuts in the belly. These cuts are called incisions. The doctor will place small surgical tools and a camera, called a laparoscope, through the incisions. The doctor will separate the upper part of your stomach from the rest of your stomach. This forms a small pouch. The pouch will hold the food you eat. The doctor will close the cuts in your belly with stitches or surgical staples. These will be removed 7 to 10 days after surgery, unless your doctor uses stitches that dissolve. The incisions will leave scars that fade with time. Most people go home about 2 days after surgery. You will probably need to take 2 to 4 weeks off from work. It depends on the type of work you do and how you feel. Follow-up care is a key part of your treatment and safety. Be sure to make and go to all appointments, and call your doctor if you are having problems. It's also a good idea to know your test results and keep a list of the medicines you take. What happens before surgery? ?Surgery can be stressful. This information will help you understand what you can expect. And it will help you safely prepare for surgery. ? Preparing for surgery  ? · Understand exactly what surgery is planned, along with the risks, benefits, and other options. · Tell your doctors ALL the medicines, vitamins, supplements, and herbal remedies you take. Some of these can increase the risk of bleeding or interact with anesthesia. ? · If you take blood thinners, such as warfarin (Coumadin), clopidogrel (Plavix), or aspirin, be sure to talk to your doctor.  He or she will tell you if you should stop taking these medicines before your surgery. Make sure that you understand exactly what your doctor wants you to do.   ? · Your doctor will tell you which medicines to take or stop before your surgery. You may need to stop taking certain medicines a week or more before surgery. So talk to your doctor as soon as you can.   ? · If you have an advance directive, let your doctor know. It may include a living will and a durable power of  for health care. Bring a copy to the hospital. If you don't have one, you may want to prepare one. It lets your doctor and loved ones know your health care wishes. Doctors advise that everyone prepare these papers before any type of surgery or procedure. What happens on the day of surgery? · Follow the instructions exactly about when to stop eating and drinking. If you don't, your surgery may be canceled. If your doctor told you to take your medicines on the day of surgery, take them with only a sip of water. ? · Take a bath or shower before you come in for your surgery. Do not apply lotions, perfumes, deodorants, or nail polish. ? · Do not shave the surgical site yourself. ? · Take off all jewelry and piercings. And take out contact lenses, if you wear them. ? At the hospital or surgery center   · Bring a picture ID. ? · The area for surgery is often marked to make sure there are no errors. ? · You will be kept comfortable and safe by your anesthesia provider. You will be asleep during the surgery. ? · The surgery will take about 1 to 3 hours. ? · After surgery, the bowel usually \"rests\" for a few days before it starts working again. You may have a thin plastic tube in your nose that goes into your stomach. This tube drains stomach juices and prevents nausea. The drainage usually looks green, brown, or even black with flecks of blood. The tube will be removed in a few days, after your bowels start working again. After the tube is removed, you can start drinking and eating again.    Going home · Be sure you have someone to drive you home. Anesthesia and pain medicine make it unsafe for you to drive. ? · You will be given more specific instructions about recovering from your surgery. They will cover things like diet, wound care, follow-up care, driving, and getting back to your normal routine. When should you call your doctor? · You have questions or concerns. ? · You don't understand how to prepare for your surgery. ? · You become ill before the surgery (such as fever, flu, or a cold). ? · You need to reschedule or have changed your mind about having the surgery. Where can you learn more? Go to http://yulisa-willian.info/. Enter R558 in the search box to learn more about \"Sleeve Gastrectomy: Before Your Surgery. \"  Current as of: October 13, 2016  Content Version: 11.4  © 4255-2117 Healthwise, Incorporated. Care instructions adapted under license by [employer] (which disclaims liability or warranty for this information). If you have questions about a medical condition or this instruction, always ask your healthcare professional. [address] any warranty or liability for your use of this information.

## 2018-03-30 NOTE — PROGRESS NOTES
1. Have you been to the ER, urgent care clinic since your last visit? Hospitalized since your last visit? No    2. Have you seen or consulted any other health care providers outside of the [address] [place] [address] since your last visit? Include any pap smears or colon screening.  No

## 2018-03-30 NOTE — PROGRESS NOTES
Bariatric Surgery History and Physical  Efrain Harper is a 34 y.o. female scheduled for laparoscopic sleeve gastrectomy with Dr. Regulo Cantu on April 18, 2018 at [facility]. Patient comes in office today for history and physical, and to receive preoperative liver shrinking diet. Patient height is 5'8'', weight is 274 pounds, BMI is 41.66, and frame is large at 9cm. Neck circumference is 18.5 inches, waist is 52.5 inches, and hip circumference is 48.5 inches. Bariatric comorbidities present: GERD  Patient Active Problem List    Diagnosis Date Noted    Morbid obesity due to excess calories ([address] 11/17/2016    GERD (gastroesophageal reflux disease)     Asthma     Trouble in sleeping      Past Medical History:   Diagnosis Date    Asthma     GERD (gastroesophageal reflux disease)     Hx gestational diabetes     Morbid obesity due to excess calories ([facility] 11/17/2016    Trouble in sleeping       Past Surgical History:   Procedure Laterality Date    COLONOSCOPY N/A 4/24/2017    COLONOSCOPY performed by Siri Barnes MD at [facility]  4/24/2017         UPPER GI ENDOSCOPY,BIOPSY  4/24/2017           Social History   Substance Use Topics    Smoking status: Former Smoker     Packs/day: 0.25     Years: 15.00     Quit date: 4/21/2012    Smokeless tobacco: Never Used    Alcohol use Yes      Comment: occasionally      Family History   Problem Relation Age of Onset    Cancer Mother     Diabetes Mother     Hypertension Mother       Prior to Admission medications    Medication Sig Start Date End Date Taking? Authorizing Provider   MULTIVITAMIN PO Take  by mouth. Yes Historical Provider   hyoscyamine SL (LEVSIN/SL) 0.125 mg SL tablet 1 Tab by SubLINGual route every four (4) hours as needed for Cramping. 3/30/18  Yes Mishel Dawson NP   ondansetron (ZOFRAN ODT) 4 mg disintegrating tablet Take 1 Tab by mouth every eight (8) hours as needed for Nausea.  3/30/18  Yes Rosie Vidal Burden NP   albuterol (PROAIR HFA) 90 mcg/actuation inhaler Take  by inhalation. Yes Historical Provider   NEBULIZER by Does Not Apply route as needed. Yes Historical Provider   ASCORBATE CALCIUM (VITAMIN C PO) Take  by mouth. Yes Historical Provider   CYANOCOBALAMIN, VITAMIN B-12, (VITAMIN B-12 PO) Take  by mouth. Yes Historical Provider   omeprazole (PRILOSEC) 20 mg capsule Take 20 mg by mouth daily. Yes Historical Provider   Cholecalciferol, Vitamin D3, 50,000 unit cap Take  by mouth every seven (7) days. Yes Historical Provider     No Known Allergies   Arthur Cardenas MD is primary care provider. HPI    Review of Systems   Constitutional: Negative for chills, fever and malaise/fatigue. HENT: Negative for congestion, hearing loss and tinnitus. Eyes: Positive for blurred vision. Negative for double vision and photophobia. Eyeglasses for reading. Respiratory: Negative for cough, sputum production and shortness of breath. Cardiovascular: Negative for chest pain, palpitations and leg swelling. Gastrointestinal: Negative for abdominal pain, blood in stool, constipation, diarrhea, heartburn, nausea and vomiting. Genitourinary: Negative for dysuria, frequency and urgency. No kidney stone history   Musculoskeletal: Positive for joint pain. Negative for back pain, myalgias and neck pain. Neurological: Negative for dizziness, seizures and headaches. Endo/Heme/Allergies:        No diabetes, no thyroid disease, no anemia at this time, and no gout history   Psychiatric/Behavioral: Negative for depression, hallucinations, memory loss, substance abuse and suicidal ideas. The patient is not nervous/anxious and does not have insomnia. Physical Exam   Constitutional: She appears well-developed and well-nourished. Non-toxic appearance. No distress. HENT:   Head: Normocephalic and atraumatic. Head is without abrasion and without contusion.  Hair is normal.   Right Ear: Hearing and external ear normal.   Left Ear: Hearing and external ear normal.   Nose: Nose normal. No nasal deformity, septal deviation or nasal septal hematoma. Right sinus exhibits no maxillary sinus tenderness and no frontal sinus tenderness. Left sinus exhibits no maxillary sinus tenderness and no frontal sinus tenderness. Mouth/Throat: Uvula is midline, oropharynx is clear and moist and mucous membranes are normal. Mucous membranes are not pale, not dry and not cyanotic. Eyes: Conjunctivae, EOM and lids are normal. Pupils are equal, round, and reactive to light. No scleral icterus. Neck: Trachea normal and normal range of motion. No JVD present. Carotid bruit is not present. No tracheal deviation present. Cardiovascular: Normal rate, regular rhythm, S1 normal, S2 normal, normal heart sounds, intact distal pulses and normal pulses. Exam reveals no gallop. No murmur heard. Pulmonary/Chest: Effort normal and breath sounds normal. She has no decreased breath sounds. She has no wheezes. She has no rhonchi. She has no rales. She exhibits no laceration, no crepitus and no retraction. Abdominal: Soft. Normal appearance. There is no hepatosplenomegaly. There is no tenderness. No hernia. Abdomen obese, non-tender, non- distended. No hernia/masses palpated. Musculoskeletal: Normal range of motion. Lymphadenopathy:        Head (right side): No submental, no submandibular, no tonsillar, no preauricular and no posterior auricular adenopathy present. Head (left side): No submental, no submandibular, no tonsillar, no preauricular and no posterior auricular adenopathy present. She has no cervical adenopathy. She has no axillary adenopathy. Neurological: She is alert. She has normal strength. No cranial nerve deficit or sensory deficit. Skin: Skin is warm and dry. No rash noted. No cyanosis. Nails show no clubbing. Psychiatric: She has a normal mood and affect.  Her speech is normal and behavior is normal. Judgment and thought content normal. Cognition and memory are normal.   Blood pressure 141/78, pulse 99, temperature 98.1 °F (36.7 °C), temperature source Oral, height 5' 8\" (1.727 m), weight 274 lb (124.3 kg), SpO2 96 %. ASSESSMENT and PLAN    Morbid obesity with body mass index of 41.66. Co-morbids listed above    Patient is scheduled for laparoscopic sleeve gastrectomy with Dr. Geno Castro on April 18, 2018 at [facility].Counseled patient in regard to post diet restrictions, follow- up office visits, follow- up care, and follow up medications. Prescriptions for Zofran and Levsin given. Patient as received educational booklet and vitamin list. Patient to start preoperative liver shrinking diet on April 4, 2018. Answered all questions and patient wishes to proceed.      Signed By: Luke Franco NP     March 30, 2018       28 minutes was spent with patient

## 2018-04-02 ENCOUNTER — OFFICE VISIT (OUTPATIENT)
Dept: SURGERY | Age: 38
End: 2018-04-02

## 2018-04-02 ENCOUNTER — HOSPITAL ENCOUNTER (OUTPATIENT)
Dept: PREADMISSION TESTING | Age: 38
Discharge: HOME | End: 2018-04-02
Payer: MEDICAID

## 2018-04-02 VITALS
RESPIRATION RATE: 20 BRPM | TEMPERATURE: 98.2 F | DIASTOLIC BLOOD PRESSURE: 85 MMHG | SYSTOLIC BLOOD PRESSURE: 120 MMHG | HEIGHT: 68 IN | WEIGHT: 276.5 LBS | BODY MASS INDEX: 41.91 KG/M2 | HEART RATE: 80 BPM

## 2018-04-02 VITALS
TEMPERATURE: 98.2 F | HEART RATE: 80 BPM | HEIGHT: 68 IN | RESPIRATION RATE: 20 BRPM | SYSTOLIC BLOOD PRESSURE: 120 MMHG | WEIGHT: 276.8 LBS | BODY MASS INDEX: 41.95 KG/M2 | DIASTOLIC BLOOD PRESSURE: 85 MMHG | OXYGEN SATURATION: 97 %

## 2018-04-02 DIAGNOSIS — K21.9 GASTROESOPHAGEAL REFLUX DISEASE WITHOUT ESOPHAGITIS: ICD-10-CM

## 2018-04-02 DIAGNOSIS — E66.01 MORBID OBESITY WITH BMI OF 40.0-44.9, ADULT (HCC): Primary | ICD-10-CM

## 2018-04-02 LAB
25(OH)D3 SERPL-MCNC: 18.2 NG/ML (ref 30–100)
ALBUMIN SERPL-MCNC: 3.8 G/DL (ref 3.5–5)
ALBUMIN/GLOB SERPL: 1.2 {RATIO} (ref 1.1–2.2)
ALP SERPL-CCNC: 113 U/L (ref 45–117)
ALT SERPL-CCNC: 26 U/L (ref 12–78)
ANION GAP SERPL CALC-SCNC: 6 MMOL/L (ref 5–15)
APPEARANCE UR: CLEAR
AST SERPL-CCNC: 14 U/L (ref 15–37)
BACTERIA URNS QL MICRO: ABNORMAL /HPF
BASOPHILS # BLD: 0 K/UL (ref 0–0.1)
BASOPHILS NFR BLD: 1 % (ref 0–1)
BILIRUB SERPL-MCNC: 0.2 MG/DL (ref 0.2–1)
BILIRUB UR QL: NEGATIVE
BUN SERPL-MCNC: 13 MG/DL (ref 6–20)
BUN/CREAT SERPL: 15 (ref 12–20)
CALCIUM SERPL-MCNC: 9.2 MG/DL (ref 8.5–10.1)
CHLORIDE SERPL-SCNC: 106 MMOL/L (ref 97–108)
CO2 SERPL-SCNC: 27 MMOL/L (ref 21–32)
COLOR UR: ABNORMAL
CREAT SERPL-MCNC: 0.84 MG/DL (ref 0.55–1.02)
DIFFERENTIAL METHOD BLD: ABNORMAL
EOSINOPHIL # BLD: 0.2 K/UL (ref 0–0.4)
EOSINOPHIL NFR BLD: 3 % (ref 0–7)
EPITH CASTS URNS QL MICRO: ABNORMAL /LPF
ERYTHROCYTE [DISTWIDTH] IN BLOOD BY AUTOMATED COUNT: 12.5 % (ref 11.5–14.5)
GLOBULIN SER CALC-MCNC: 3.2 G/DL (ref 2–4)
GLUCOSE SERPL-MCNC: 109 MG/DL (ref 65–100)
GLUCOSE UR STRIP.AUTO-MCNC: NEGATIVE MG/DL
HCT VFR BLD AUTO: 33.5 % (ref 35–47)
HGB BLD-MCNC: 11 G/DL (ref 11.5–16)
HGB UR QL STRIP: ABNORMAL
HYALINE CASTS URNS QL MICRO: ABNORMAL /LPF (ref 0–5)
IMM GRANULOCYTES # BLD: 0 K/UL (ref 0–0.04)
IMM GRANULOCYTES NFR BLD AUTO: 0 % (ref 0–0.5)
KETONES UR QL STRIP.AUTO: NEGATIVE MG/DL
LEUKOCYTE ESTERASE UR QL STRIP.AUTO: NEGATIVE
LYMPHOCYTES # BLD: 1.9 K/UL (ref 0.8–3.5)
LYMPHOCYTES NFR BLD: 31 % (ref 12–49)
MCH RBC QN AUTO: 32.3 PG (ref 26–34)
MCHC RBC AUTO-ENTMCNC: 32.8 G/DL (ref 30–36.5)
MCV RBC AUTO: 98.2 FL (ref 80–99)
MONOCYTES # BLD: 0.6 K/UL (ref 0–1)
MONOCYTES NFR BLD: 10 % (ref 5–13)
NEUTS SEG # BLD: 3.5 K/UL (ref 1.8–8)
NEUTS SEG NFR BLD: 55 % (ref 32–75)
NITRITE UR QL STRIP.AUTO: NEGATIVE
NRBC # BLD: 0 K/UL (ref 0–0.01)
NRBC BLD-RTO: 0 PER 100 WBC
PH UR STRIP: 6 [PH] (ref 5–8)
PLATELET # BLD AUTO: 229 K/UL (ref 150–400)
PMV BLD AUTO: 10.8 FL (ref 8.9–12.9)
POTASSIUM SERPL-SCNC: 4.3 MMOL/L (ref 3.5–5.1)
PROT SERPL-MCNC: 7 G/DL (ref 6.4–8.2)
PROT UR STRIP-MCNC: NEGATIVE MG/DL
RBC # BLD AUTO: 3.41 M/UL (ref 3.8–5.2)
RBC #/AREA URNS HPF: ABNORMAL /HPF (ref 0–5)
SODIUM SERPL-SCNC: 139 MMOL/L (ref 136–145)
SP GR UR REFRACTOMETRY: 1.02 (ref 1–1.03)
TSH SERPL DL<=0.05 MIU/L-ACNC: 1.68 UIU/ML (ref 0.36–3.74)
UA: UC IF INDICATED,UAUC: ABNORMAL
UROBILINOGEN UR QL STRIP.AUTO: 0.2 EU/DL (ref 0.2–1)
WBC # BLD AUTO: 6.3 K/UL (ref 3.6–11)
WBC URNS QL MICRO: ABNORMAL /HPF (ref 0–4)

## 2018-04-02 PROCEDURE — 84443 ASSAY THYROID STIM HORMONE: CPT | Performed by: SURGERY

## 2018-04-02 PROCEDURE — 81001 URINALYSIS AUTO W/SCOPE: CPT | Performed by: SURGERY

## 2018-04-02 PROCEDURE — 86677 HELICOBACTER PYLORI ANTIBODY: CPT | Performed by: SURGERY

## 2018-04-02 PROCEDURE — 85025 COMPLETE CBC W/AUTO DIFF WBC: CPT | Performed by: SURGERY

## 2018-04-02 PROCEDURE — 82306 VITAMIN D 25 HYDROXY: CPT | Performed by: SURGERY

## 2018-04-02 PROCEDURE — 80053 COMPREHEN METABOLIC PANEL: CPT | Performed by: SURGERY

## 2018-04-02 PROCEDURE — 87086 URINE CULTURE/COLONY COUNT: CPT | Performed by: SURGERY

## 2018-04-02 PROCEDURE — 36415 COLL VENOUS BLD VENIPUNCTURE: CPT | Performed by: SURGERY

## 2018-04-02 RX ORDER — MICONAZOLE NITRATE 2 %
2 CREAM WITH APPLICATOR VAGINAL DAILY
COMMUNITY

## 2018-04-02 RX ORDER — MELATONIN 5 MG
5 CAPSULE ORAL
COMMUNITY

## 2018-04-02 RX ORDER — ALBUTEROL SULFATE 2.5 MG/.5ML
2.5 SOLUTION RESPIRATORY (INHALATION) AS NEEDED
COMMUNITY

## 2018-04-02 NOTE — PROGRESS NOTES
1. Have you been to the ER, urgent care clinic since your last visit? Hospitalized since your last visit? No    2. Have you seen or consulted any other health care providers outside of the [address] [place] [address] since your last visit? Include any pap smears or colon screening.  Yes When: PCP

## 2018-04-02 NOTE — MR AVS SNAPSHOT
[address] [address] 
[phone] Patient: Kevon Vaughn MRN: [id number] :1980 Visit Information Date & Time Provider Department Dept. Phone Encounter #  
 2018  1:20 PM Taran Chandler, [address] [phone] [id number] Your Appointments 5/3/2018  1:00 PM  
POST OP 10 MIN with Sergo Seals NP  
[facility] [address] ([address]) Appt Note: 2weeks [address] [address]  
[address] [phone]  
  
    
 2018  4:00 PM  
POST OP 10 MIN with Jorge Rodriguez NP  
[facility] [address] ([address]) Appt Note: 4weeks [address] [address]  
[address] [phone]  
  
    
 2018  4:00 PM  
POST OP 10 MIN with Jorge Rodriguez NP  
[facility] [address] ([address]) Appt Note: 6weeks [address] [address]  
[phone] Upcoming Health Maintenance Date Due Pneumococcal 19-64 Medium Risk (1 of 1 - PPSV23) 1997 PAP AKA CERVICAL CYTOLOGY 1999 Influenza Age 5 to Adult 2017 DTaP/Tdap/Td series (2 - Td) 3/12/2027 Allergies as of 2018  Review Complete On: 2018 By: Taran Chandler MD  
 No Known Allergies Current Immunizations  Never Reviewed Name Date Tdap 3/12/2017  5:49 PM  
  
 Not reviewed this visit You Were Diagnosed With   
  
 Codes Comments Morbid obesity with BMI of 40.0-44.9, adult ([facility]    -  Primary ICD-10-CM: E66.01, Z68.41 
ICD-9-CM: 278.01, V85.41 Gastroesophageal reflux disease without esophagitis     ICD-10-CM: K21.9 ICD-9-CM: 530.81 Vitals BP Pulse Temp Resp Height(growth percentile) Weight(growth percentile) 120/85 80 98.2 °F (36.8 °C) 20 5' 8\" (1.727 m) 276 lb 12.8 oz (125.6 kg) LMP SpO2 BMI OB Status Smoking Status 03/26/2018 (Approximate) 97% 42.09 kg/m2 Having regular periods Former Smoker BMI and BSA Data Body Mass Index Body Surface Area 42.09 kg/m 2 2.45 m 2 Preferred Pharmacy Pharmacy Name Phone Yosef Schwartz e [facility] [phone] [facility] [phone] Your Updated Medication List  
  
   
This list is accurate as of 4/2/18  2:42 PM.  Always use your most recent med list.  
  
  
  
  
 Cholecalciferol (Vitamin D3) 50,000 unit Cap Take  by mouth every seven (7) days. Citracal + D tablet Generic drug:  calcium citrate-vitamin D3 Take 2 Tabs by mouth daily. melatonin 5 mg Cap capsule Take 5 mg by mouth nightly as needed. MULTIVITAMIN PO Take  by mouth daily. NEBULIZER  
by Does Not Apply route as needed. omeprazole 20 mg capsule Commonly known as:  PRILOSEC Take 40 mg by mouth daily. * PROAIR HFA 90 mcg/actuation inhaler Generic drug:  albuterol Take 2 Puffs by inhalation as needed. * albuterol sulfate 2.5 mg/0.5 mL Nebu nebulizer solution Commonly known as:  PROVENTIL;VENTOLIN  
2.5 mg by Nebulization route as needed for Wheezing. VITAMIN B-12 PO Take 3,000 mcg by mouth daily. VITAMIN C PO Take 500 mg by mouth daily. * Notice: This list has 2 medication(s) that are the same as other medications prescribed for you. Read the directions carefully, and ask your doctor or other care provider to review them with you. Introducing [facility]! [employer] introduces bizsol patient portal. Now you can access parts of your medical record, email your doctor's office, and request medication refills online.    
 
1. In your internet browser, go to [email or web address] 2. Click on the First Time User? Click Here link in the Sign In box. You will see the New Member Sign Up page. 3. Enter your Eventcheq Access Code exactly as it appears below. You will not need to use this code after youve completed the sign-up process. If you do not sign up before the expiration date, you must request a new code. · Eventcheq Access Code: [email or web address] Expires: 5/24/2018 12:09 PM 
 
4. Enter the last four digits of your Social Security Number (xxxx) and Date of Birth (mm/dd/yyyy) as indicated and click Submit. You will be taken to the next sign-up page. 5. Create a COGEONt ID. This will be your Eventcheq login ID and cannot be changed, so think of one that is secure and easy to remember. 6. Create a Eventcheq password. You can change your password at any time. 7. Enter your Password Reset Question and Answer. This can be used at a later time if you forget your password. 8. Enter your e-mail address. You will receive e-mail notification when new information is available in [address] 9. Click Sign Up. You can now view and download portions of your medical record. 10. Click the Download Summary menu link to download a portable copy of your medical information. If you have questions, please visit the Frequently Asked Questions section of the Event[employer] website. Remember, Eventcheq is NOT to be used for urgent needs. For medical emergencies, dial 911. Now available from your iPhone and Android! Please provide this summary of care documentation to your next provider. Your primary care clinician is listed as Lucy Cabot. If you have any questions after today's visit, please call [phone].

## 2018-04-02 NOTE — PROGRESS NOTES
Office Visit    Shabbir Last has a history of morbid obesity with multiple comorbidities. Body mass index is 42.09 kg/(m^2). Marilynn Goins She has failed multiple attempts at weight loss. She has been approved for laparoscopic vertical sleeve gastrectomy, scheduled for April 18th. Acid reflux is completely alleviated by Prilosec. Without daily PPI use, she notes that she cannot eat spicy/fried foods or eat late at night without causing heartburn. Patient has been engaging in regular strength training and has recently started meal prepping. The patient has attended the pre-op educational classes. She has had an opportunity to read our consent forms. I answered her questions regarding the surgery. I reviewed the role for the restrictive nature of this procedure as a tool to help her achieve her weight loss goals. I reiterated the need for adherence to lifelong changes in dietary choices, eating habits and exercise to maintain the optimal outcome. ~~~~~~      PHYSICAL EXAM: deferred              ~~~~~~      ASSESSMENT: History of morbid obesity with multiple co-morbidities including GERD and asthma. Diagnoses and all orders for this visit:    1. Morbid obesity with BMI of 40.0-44.9, adult ([facility]    2. Gastroesophageal reflux disease without esophagitis      The patient continues to satisfy the NIH criteria and remains to be a candidate for weight loss surgery. She is an appropriate candidate for laparoscopic vertical sleeve gastrectomy. She has read   our consent form and has signed indicating that she understands the risks, accepts and wishes to proceed. ~~~~~~    PLAN:  1. Laparoscopic vertical sleeve gastrectomy with upper endoscopy, possible conversion to open procedure, possible liver biopsy, and possible cholecystectomy. 2. Begin liver shrinking diet on April 4th. 3. Procedure is scheduled for April 18th, 2018.  She was also reminded to continue being mindful of food choices, exercising portion control, and to engage in regular physical activity. 2:00 AM - 2:15 PM   Total face to face time with patient: 15 minutes. Greater than 50% of the time was spent in counseling.      Signed By: Karol Mendez MD     April 2, 2018           Written by Roro Pulido, as dictated by Gill Au MD.

## 2018-04-02 NOTE — PROGRESS NOTES
1. Have you been to the ER, urgent care clinic since your last visit? Hospitalized since your last visit?  no    2. Have you seen or consulted any other health care providers outside of the [address] [place] [address] since your last visit? Include any pap smears or colon screening.    no

## 2018-04-03 ENCOUNTER — TELEPHONE (OUTPATIENT)
Dept: SURGERY | Age: 38
End: 2018-04-03

## 2018-04-03 DIAGNOSIS — E55.9 VITAMIN D DEFICIENCY: Primary | ICD-10-CM

## 2018-04-03 LAB
BACTERIA SPEC CULT: NORMAL
CC UR VC: NORMAL
H PYLORI IGM SER-ACNC: <9 UNITS (ref 0–8.9)
SERVICE CMNT-IMP: NORMAL

## 2018-04-03 RX ORDER — ERGOCALCIFEROL 1.25 MG/1
50000 CAPSULE ORAL
Qty: 24 CAP | Refills: 2 | Status: SHIPPED | OUTPATIENT
Start: 2018-04-06

## 2018-04-03 NOTE — TELEPHONE ENCOUNTER
Left message for patient regarding vitamin d low per Sharon Bean NP. Vitamin d 50,00 units e scrip to pharmacy on file.

## 2018-04-03 NOTE — TELEPHONE ENCOUNTER
----- Message from Serena Magaña NP sent at 4/3/2018  2:25 PM EDT -----  Regarding: Prescription  Patient needs prescription Vitamin D 50,000 units Mondays and Fridays #24/2 refills called into pharmacy. Please inform patient. Sheila Morenovandanabella  ----- Message -----     From: Tahira Romero LPN     Sent: 0/3/5529   2:07 PM       To: Serena Magaña NP        ----- Message -----     From: Juancarlos Jose RN     Sent: 4/3/2018  10:06 AM       To: ALEM Espinoza  Please see abnormal vitamin D level drawn in PAT. Urine sent for culture. Thanks.   Mukund London RN  [phone]

## 2018-04-04 ENCOUNTER — TELEPHONE (OUTPATIENT)
Dept: SURGERY | Age: 38
End: 2018-04-04

## 2018-04-04 NOTE — TELEPHONE ENCOUNTER
Please call patient. She had lab work done and her vitamin d is low. She wants to know if she can take vitamin d2 and d3 together.

## 2018-04-05 NOTE — TELEPHONE ENCOUNTER
Left message for patient regarding vitamins. Per Casey Luo NP it is okay to take vitamin D 3 and vitamin D 2 together.

## 2018-04-17 ENCOUNTER — ANESTHESIA EVENT (OUTPATIENT)
Dept: SURGERY | Age: 38
DRG: 403 | End: 2018-04-17
Payer: MEDICAID

## 2018-04-18 ENCOUNTER — ANESTHESIA (OUTPATIENT)
Dept: SURGERY | Age: 38
DRG: 403 | End: 2018-04-18
Payer: MEDICAID

## 2018-04-18 ENCOUNTER — HOSPITAL ENCOUNTER (INPATIENT)
Age: 38
LOS: 3 days | Discharge: HOME | DRG: 403 | End: 2018-04-21
Attending: SURGERY | Admitting: SURGERY
Payer: MEDICAID

## 2018-04-18 DIAGNOSIS — E66.01 MORBID OBESITY DUE TO EXCESS CALORIES (HCC): Primary | ICD-10-CM

## 2018-04-18 PROBLEM — Z98.84 S/P LAPAROSCOPIC SLEEVE GASTRECTOMY: Status: ACTIVE | Noted: 2018-04-18

## 2018-04-18 PROBLEM — G47.30 SLEEP APNEA: Status: ACTIVE | Noted: 2018-04-18

## 2018-04-18 LAB — HCG UR QL: NEGATIVE

## 2018-04-18 PROCEDURE — 65660000000 HC RM CCU STEPDOWN

## 2018-04-18 PROCEDURE — 77030022474 HC RELD STPLR ENDO GIA COVD -C: Performed by: SURGERY

## 2018-04-18 PROCEDURE — 77030018836 HC SOL IRR NACL ICUM -A: Performed by: SURGERY

## 2018-04-18 PROCEDURE — 77030035029 HC NDL INSUF VERES DISP COVD -B: Performed by: SURGERY

## 2018-04-18 PROCEDURE — 76060000035 HC ANESTHESIA 2 TO 2.5 HR: Performed by: SURGERY

## 2018-04-18 PROCEDURE — 77030031139 HC SUT VCRL2 J&J -A: Performed by: SURGERY

## 2018-04-18 PROCEDURE — 77030009965 HC RELD STPLR ENDOS COVD -D: Performed by: SURGERY

## 2018-04-18 PROCEDURE — 76010000876 HC OR TIME 2 TO 2.5HR INTENSV - TIER 2: Performed by: SURGERY

## 2018-04-18 PROCEDURE — 0DB64Z3 EXCISION OF STOMACH, PERCUTANEOUS ENDOSCOPIC APPROACH, VERTICAL: ICD-10-PCS | Performed by: SURGERY

## 2018-04-18 PROCEDURE — 81025 URINE PREGNANCY TEST: CPT

## 2018-04-18 PROCEDURE — 77030008684 HC TU ET CUF COVD -B: Performed by: ANESTHESIOLOGY

## 2018-04-18 PROCEDURE — 74011250637 HC RX REV CODE- 250/637

## 2018-04-18 PROCEDURE — 77030020263 HC SOL INJ SOD CL0.9% LFCR 1000ML: Performed by: SURGERY

## 2018-04-18 PROCEDURE — 76210000000 HC OR PH I REC 2 TO 2.5 HR: Performed by: SURGERY

## 2018-04-18 PROCEDURE — 77030022704 HC SUT VLOC COVD -B: Performed by: SURGERY

## 2018-04-18 PROCEDURE — 77030008756 HC TU IRR SUC STRY -B: Performed by: SURGERY

## 2018-04-18 PROCEDURE — 74011250636 HC RX REV CODE- 250/636

## 2018-04-18 PROCEDURE — 77030026438 HC STYL ET INTUB CARD -A: Performed by: ANESTHESIOLOGY

## 2018-04-18 PROCEDURE — 77030034699 HC LIGASURE MRYLND LAP SEAL DIV COVD -F: Performed by: SURGERY

## 2018-04-18 PROCEDURE — 77030008557 HC TBNG SMK EVAC STOR -B: Performed by: SURGERY

## 2018-04-18 PROCEDURE — 77030008439 HC STPL REINF BAXT -C: Performed by: SURGERY

## 2018-04-18 PROCEDURE — 74011250637 HC RX REV CODE- 250/637: Performed by: SURGERY

## 2018-04-18 PROCEDURE — 77030035044 HC TRCR ENDOSC VRSPRT BLDLSS COVD -C: Performed by: SURGERY

## 2018-04-18 PROCEDURE — 74011000250 HC RX REV CODE- 250: Performed by: SURGERY

## 2018-04-18 PROCEDURE — 74011250636 HC RX REV CODE- 250/636: Performed by: ANESTHESIOLOGY

## 2018-04-18 PROCEDURE — 77030039266 HC ADH SKN EXOFIN S2SG -A: Performed by: SURGERY

## 2018-04-18 PROCEDURE — 77030032490 HC SLV COMPR SCD KNE COVD -B: Performed by: SURGERY

## 2018-04-18 PROCEDURE — 77030002933 HC SUT MCRYL J&J -A: Performed by: SURGERY

## 2018-04-18 PROCEDURE — 77030032435: Performed by: SURGERY

## 2018-04-18 PROCEDURE — 74011000250 HC RX REV CODE- 250

## 2018-04-18 PROCEDURE — 74011250636 HC RX REV CODE- 250/636: Performed by: SURGERY

## 2018-04-18 PROCEDURE — 77030011640 HC PAD GRND REM COVD -A: Performed by: SURGERY

## 2018-04-18 PROCEDURE — 77030002996 HC SUT SLK J&J -A: Performed by: SURGERY

## 2018-04-18 PROCEDURE — 88307 TISSUE EXAM BY PATHOLOGIST: CPT | Performed by: SURGERY

## 2018-04-18 PROCEDURE — 8E0W8CZ ROBOTIC ASSISTED PROCEDURE OF TRUNK REGION, VIA NATURAL OR ARTIFICIAL OPENING ENDOSCOPIC: ICD-10-PCS | Performed by: SURGERY

## 2018-04-18 PROCEDURE — 77030016151 HC PROTCTR LNS DFOG COVD -B: Performed by: SURGERY

## 2018-04-18 DEVICE — IMPLANTABLE DEVICE: Type: IMPLANTABLE DEVICE | Site: STOMACH | Status: FUNCTIONAL

## 2018-04-18 RX ORDER — ACETAMINOPHEN 500 MG
1000 TABLET ORAL EVERY 6 HOURS
Status: DISCONTINUED | OUTPATIENT
Start: 2018-04-18 | End: 2018-04-19

## 2018-04-18 RX ORDER — FENTANYL CITRATE 50 UG/ML
25 INJECTION, SOLUTION INTRAMUSCULAR; INTRAVENOUS
Status: DISCONTINUED | OUTPATIENT
Start: 2018-04-18 | End: 2018-04-18 | Stop reason: HOSPADM

## 2018-04-18 RX ORDER — SODIUM CHLORIDE 0.9 % (FLUSH) 0.9 %
5-10 SYRINGE (ML) INJECTION AS NEEDED
Status: DISCONTINUED | OUTPATIENT
Start: 2018-04-18 | End: 2018-04-18 | Stop reason: HOSPADM

## 2018-04-18 RX ORDER — SODIUM CHLORIDE 0.9 % (FLUSH) 0.9 %
5-10 SYRINGE (ML) INJECTION EVERY 8 HOURS
Status: DISCONTINUED | OUTPATIENT
Start: 2018-04-18 | End: 2018-04-21 | Stop reason: HOSPADM

## 2018-04-18 RX ORDER — HYDRALAZINE HYDROCHLORIDE 20 MG/ML
INJECTION INTRAMUSCULAR; INTRAVENOUS
Status: COMPLETED
Start: 2018-04-18 | End: 2018-04-18

## 2018-04-18 RX ORDER — OXYCODONE HYDROCHLORIDE 5 MG/1
5 TABLET ORAL
Status: DISCONTINUED | OUTPATIENT
Start: 2018-04-18 | End: 2018-04-20

## 2018-04-18 RX ORDER — SODIUM CHLORIDE 9 MG/ML
25 INJECTION, SOLUTION INTRAVENOUS CONTINUOUS
Status: DISCONTINUED | OUTPATIENT
Start: 2018-04-18 | End: 2018-04-18 | Stop reason: SDUPTHER

## 2018-04-18 RX ORDER — FENTANYL CITRATE 50 UG/ML
50 INJECTION, SOLUTION INTRAMUSCULAR; INTRAVENOUS AS NEEDED
Status: DISCONTINUED | OUTPATIENT
Start: 2018-04-18 | End: 2018-04-18 | Stop reason: HOSPADM

## 2018-04-18 RX ORDER — SODIUM CHLORIDE 0.9 % (FLUSH) 0.9 %
5-10 SYRINGE (ML) INJECTION EVERY 8 HOURS
Status: DISCONTINUED | OUTPATIENT
Start: 2018-04-18 | End: 2018-04-18 | Stop reason: HOSPADM

## 2018-04-18 RX ORDER — PANTOPRAZOLE SODIUM 40 MG/1
40 TABLET, DELAYED RELEASE ORAL DAILY
Status: DISCONTINUED | OUTPATIENT
Start: 2018-04-19 | End: 2018-04-21 | Stop reason: HOSPADM

## 2018-04-18 RX ORDER — DEXAMETHASONE SODIUM PHOSPHATE 4 MG/ML
INJECTION, SOLUTION INTRA-ARTICULAR; INTRALESIONAL; INTRAMUSCULAR; INTRAVENOUS; SOFT TISSUE AS NEEDED
Status: DISCONTINUED | OUTPATIENT
Start: 2018-04-18 | End: 2018-04-18 | Stop reason: HOSPADM

## 2018-04-18 RX ORDER — PROPOFOL 10 MG/ML
INJECTION, EMULSION INTRAVENOUS AS NEEDED
Status: DISCONTINUED | OUTPATIENT
Start: 2018-04-18 | End: 2018-04-18 | Stop reason: HOSPADM

## 2018-04-18 RX ORDER — FENTANYL CITRATE 50 UG/ML
50 INJECTION, SOLUTION INTRAMUSCULAR; INTRAVENOUS AS NEEDED
Status: DISCONTINUED | OUTPATIENT
Start: 2018-04-18 | End: 2018-04-18 | Stop reason: SDUPTHER

## 2018-04-18 RX ORDER — ONDANSETRON 2 MG/ML
4 INJECTION INTRAMUSCULAR; INTRAVENOUS AS NEEDED
Status: DISCONTINUED | OUTPATIENT
Start: 2018-04-18 | End: 2018-04-18 | Stop reason: HOSPADM

## 2018-04-18 RX ORDER — FENTANYL CITRATE 50 UG/ML
25 INJECTION, SOLUTION INTRAMUSCULAR; INTRAVENOUS
Status: DISCONTINUED | OUTPATIENT
Start: 2018-04-18 | End: 2018-04-18 | Stop reason: SDUPTHER

## 2018-04-18 RX ORDER — OXYCODONE HYDROCHLORIDE 5 MG/1
10 TABLET ORAL
Status: DISCONTINUED | OUTPATIENT
Start: 2018-04-18 | End: 2018-04-20

## 2018-04-18 RX ORDER — MIDAZOLAM HYDROCHLORIDE 1 MG/ML
0.5 INJECTION, SOLUTION INTRAMUSCULAR; INTRAVENOUS
Status: DISCONTINUED | OUTPATIENT
Start: 2018-04-18 | End: 2018-04-18 | Stop reason: HOSPADM

## 2018-04-18 RX ORDER — DIPHENHYDRAMINE HYDROCHLORIDE 50 MG/ML
12.5 INJECTION, SOLUTION INTRAMUSCULAR; INTRAVENOUS AS NEEDED
Status: DISCONTINUED | OUTPATIENT
Start: 2018-04-18 | End: 2018-04-18 | Stop reason: SDUPTHER

## 2018-04-18 RX ORDER — ONDANSETRON 2 MG/ML
4 INJECTION INTRAMUSCULAR; INTRAVENOUS AS NEEDED
Status: DISCONTINUED | OUTPATIENT
Start: 2018-04-18 | End: 2018-04-18 | Stop reason: SDUPTHER

## 2018-04-18 RX ORDER — ONDANSETRON 2 MG/ML
4 INJECTION INTRAMUSCULAR; INTRAVENOUS
Status: DISCONTINUED | OUTPATIENT
Start: 2018-04-18 | End: 2018-04-21 | Stop reason: HOSPADM

## 2018-04-18 RX ORDER — SUCCINYLCHOLINE CHLORIDE 20 MG/ML
INJECTION INTRAMUSCULAR; INTRAVENOUS AS NEEDED
Status: DISCONTINUED | OUTPATIENT
Start: 2018-04-18 | End: 2018-04-18 | Stop reason: HOSPADM

## 2018-04-18 RX ORDER — LORAZEPAM 2 MG/ML
1 INJECTION INTRAMUSCULAR
Status: DISCONTINUED | OUTPATIENT
Start: 2018-04-18 | End: 2018-04-21 | Stop reason: HOSPADM

## 2018-04-18 RX ORDER — SODIUM CHLORIDE 9 MG/ML
25 INJECTION, SOLUTION INTRAVENOUS CONTINUOUS
Status: DISCONTINUED | OUTPATIENT
Start: 2018-04-18 | End: 2018-04-18 | Stop reason: HOSPADM

## 2018-04-18 RX ORDER — ACETAMINOPHEN 10 MG/ML
INJECTION, SOLUTION INTRAVENOUS AS NEEDED
Status: DISCONTINUED | OUTPATIENT
Start: 2018-04-18 | End: 2018-04-18 | Stop reason: HOSPADM

## 2018-04-18 RX ORDER — ALBUTEROL SULFATE 0.83 MG/ML
2.5 SOLUTION RESPIRATORY (INHALATION) AS NEEDED
Status: DISCONTINUED | OUTPATIENT
Start: 2018-04-18 | End: 2018-04-21 | Stop reason: HOSPADM

## 2018-04-18 RX ORDER — HYDRALAZINE HYDROCHLORIDE 20 MG/ML
10 INJECTION INTRAMUSCULAR; INTRAVENOUS ONCE
Status: COMPLETED | OUTPATIENT
Start: 2018-04-18 | End: 2018-04-18

## 2018-04-18 RX ORDER — MORPHINE SULFATE 10 MG/ML
2 INJECTION, SOLUTION INTRAMUSCULAR; INTRAVENOUS
Status: DISCONTINUED | OUTPATIENT
Start: 2018-04-18 | End: 2018-04-18 | Stop reason: SDUPTHER

## 2018-04-18 RX ORDER — HYOSCYAMINE SULFATE 0.12 MG/1
TABLET SUBLINGUAL
Status: COMPLETED
Start: 2018-04-18 | End: 2018-04-18

## 2018-04-18 RX ORDER — SODIUM CHLORIDE, SODIUM LACTATE, POTASSIUM CHLORIDE, CALCIUM CHLORIDE 600; 310; 30; 20 MG/100ML; MG/100ML; MG/100ML; MG/100ML
INJECTION, SOLUTION INTRAVENOUS
Status: DISCONTINUED | OUTPATIENT
Start: 2018-04-18 | End: 2018-04-18 | Stop reason: HOSPADM

## 2018-04-18 RX ORDER — FENTANYL CITRATE 50 UG/ML
INJECTION, SOLUTION INTRAMUSCULAR; INTRAVENOUS AS NEEDED
Status: DISCONTINUED | OUTPATIENT
Start: 2018-04-18 | End: 2018-04-18 | Stop reason: HOSPADM

## 2018-04-18 RX ORDER — GABAPENTIN 250 MG/5ML
500 SOLUTION ORAL ONCE
Status: COMPLETED | OUTPATIENT
Start: 2018-04-18 | End: 2018-04-18

## 2018-04-18 RX ORDER — LIDOCAINE HYDROCHLORIDE 20 MG/ML
INJECTION, SOLUTION EPIDURAL; INFILTRATION; INTRACAUDAL; PERINEURAL AS NEEDED
Status: DISCONTINUED | OUTPATIENT
Start: 2018-04-18 | End: 2018-04-18 | Stop reason: HOSPADM

## 2018-04-18 RX ORDER — ALBUTEROL SULFATE 90 UG/1
2 AEROSOL, METERED RESPIRATORY (INHALATION) AS NEEDED
Status: DISCONTINUED | OUTPATIENT
Start: 2018-04-18 | End: 2018-04-18 | Stop reason: SDUPTHER

## 2018-04-18 RX ORDER — MIDAZOLAM HYDROCHLORIDE 1 MG/ML
1 INJECTION, SOLUTION INTRAMUSCULAR; INTRAVENOUS AS NEEDED
Status: DISCONTINUED | OUTPATIENT
Start: 2018-04-18 | End: 2018-04-18 | Stop reason: HOSPADM

## 2018-04-18 RX ORDER — NALOXONE HYDROCHLORIDE 0.4 MG/ML
INJECTION, SOLUTION INTRAMUSCULAR; INTRAVENOUS; SUBCUTANEOUS
Status: COMPLETED
Start: 2018-04-18 | End: 2018-04-18

## 2018-04-18 RX ORDER — NALOXONE HYDROCHLORIDE 0.4 MG/ML
INJECTION, SOLUTION INTRAMUSCULAR; INTRAVENOUS; SUBCUTANEOUS AS NEEDED
Status: DISCONTINUED | OUTPATIENT
Start: 2018-04-18 | End: 2018-04-18 | Stop reason: HOSPADM

## 2018-04-18 RX ORDER — MIDAZOLAM HYDROCHLORIDE 1 MG/ML
1 INJECTION, SOLUTION INTRAMUSCULAR; INTRAVENOUS AS NEEDED
Status: DISCONTINUED | OUTPATIENT
Start: 2018-04-18 | End: 2018-04-18 | Stop reason: SDUPTHER

## 2018-04-18 RX ORDER — ONDANSETRON 2 MG/ML
INJECTION INTRAMUSCULAR; INTRAVENOUS AS NEEDED
Status: DISCONTINUED | OUTPATIENT
Start: 2018-04-18 | End: 2018-04-18 | Stop reason: HOSPADM

## 2018-04-18 RX ORDER — ENOXAPARIN SODIUM 100 MG/ML
40 INJECTION SUBCUTANEOUS EVERY 24 HOURS
Status: COMPLETED | OUTPATIENT
Start: 2018-04-18 | End: 2018-04-18

## 2018-04-18 RX ORDER — BUPIVACAINE HYDROCHLORIDE AND EPINEPHRINE 5; 5 MG/ML; UG/ML
30 INJECTION, SOLUTION EPIDURAL; INTRACAUDAL; PERINEURAL ONCE
Status: COMPLETED | OUTPATIENT
Start: 2018-04-18 | End: 2018-04-18

## 2018-04-18 RX ORDER — DIPHENHYDRAMINE HYDROCHLORIDE 50 MG/ML
12.5 INJECTION, SOLUTION INTRAMUSCULAR; INTRAVENOUS AS NEEDED
Status: DISCONTINUED | OUTPATIENT
Start: 2018-04-18 | End: 2018-04-18 | Stop reason: HOSPADM

## 2018-04-18 RX ORDER — SCOLOPAMINE TRANSDERMAL SYSTEM 1 MG/1
1 PATCH, EXTENDED RELEASE TRANSDERMAL
Status: DISCONTINUED | OUTPATIENT
Start: 2018-04-18 | End: 2018-04-21 | Stop reason: HOSPADM

## 2018-04-18 RX ORDER — LABETALOL HYDROCHLORIDE 5 MG/ML
INJECTION, SOLUTION INTRAVENOUS AS NEEDED
Status: DISCONTINUED | OUTPATIENT
Start: 2018-04-18 | End: 2018-04-18 | Stop reason: HOSPADM

## 2018-04-18 RX ORDER — ROCURONIUM BROMIDE 10 MG/ML
INJECTION, SOLUTION INTRAVENOUS AS NEEDED
Status: DISCONTINUED | OUTPATIENT
Start: 2018-04-18 | End: 2018-04-18 | Stop reason: HOSPADM

## 2018-04-18 RX ORDER — HYDROMORPHONE HYDROCHLORIDE 2 MG/ML
1 INJECTION, SOLUTION INTRAMUSCULAR; INTRAVENOUS; SUBCUTANEOUS
Status: DISCONTINUED | OUTPATIENT
Start: 2018-04-18 | End: 2018-04-21 | Stop reason: HOSPADM

## 2018-04-18 RX ORDER — GABAPENTIN 300 MG/1
300 CAPSULE ORAL 3 TIMES DAILY
Status: DISPENSED | OUTPATIENT
Start: 2018-04-18 | End: 2018-04-20

## 2018-04-18 RX ORDER — SODIUM CHLORIDE 0.9 % (FLUSH) 0.9 %
5-10 SYRINGE (ML) INJECTION AS NEEDED
Status: DISCONTINUED | OUTPATIENT
Start: 2018-04-18 | End: 2018-04-21 | Stop reason: HOSPADM

## 2018-04-18 RX ORDER — HYOSCYAMINE SULFATE 0.12 MG/1
0.12 TABLET SUBLINGUAL ONCE
Status: COMPLETED | OUTPATIENT
Start: 2018-04-18 | End: 2018-04-18

## 2018-04-18 RX ORDER — SODIUM CHLORIDE, SODIUM LACTATE, POTASSIUM CHLORIDE, CALCIUM CHLORIDE 600; 310; 30; 20 MG/100ML; MG/100ML; MG/100ML; MG/100ML
125 INJECTION, SOLUTION INTRAVENOUS CONTINUOUS
Status: DISCONTINUED | OUTPATIENT
Start: 2018-04-18 | End: 2018-04-18 | Stop reason: HOSPADM

## 2018-04-18 RX ORDER — MIDAZOLAM HYDROCHLORIDE 1 MG/ML
0.5 INJECTION, SOLUTION INTRAMUSCULAR; INTRAVENOUS
Status: DISCONTINUED | OUTPATIENT
Start: 2018-04-18 | End: 2018-04-18 | Stop reason: SDUPTHER

## 2018-04-18 RX ORDER — LIDOCAINE HYDROCHLORIDE 10 MG/ML
0.1 INJECTION, SOLUTION EPIDURAL; INFILTRATION; INTRACAUDAL; PERINEURAL AS NEEDED
Status: DISCONTINUED | OUTPATIENT
Start: 2018-04-18 | End: 2018-04-18 | Stop reason: HOSPADM

## 2018-04-18 RX ORDER — SODIUM CHLORIDE, SODIUM LACTATE, POTASSIUM CHLORIDE, CALCIUM CHLORIDE 600; 310; 30; 20 MG/100ML; MG/100ML; MG/100ML; MG/100ML
125 INJECTION, SOLUTION INTRAVENOUS CONTINUOUS
Status: DISPENSED | OUTPATIENT
Start: 2018-04-18 | End: 2018-04-19

## 2018-04-18 RX ORDER — HYDROMORPHONE HYDROCHLORIDE 2 MG/ML
INJECTION, SOLUTION INTRAMUSCULAR; INTRAVENOUS; SUBCUTANEOUS AS NEEDED
Status: DISCONTINUED | OUTPATIENT
Start: 2018-04-18 | End: 2018-04-18 | Stop reason: HOSPADM

## 2018-04-18 RX ORDER — HYOSCYAMINE SULFATE 0.12 MG/1
0.12 TABLET SUBLINGUAL
Status: DISCONTINUED | OUTPATIENT
Start: 2018-04-18 | End: 2018-04-21 | Stop reason: HOSPADM

## 2018-04-18 RX ORDER — LIDOCAINE HYDROCHLORIDE 10 MG/ML
0.1 INJECTION, SOLUTION EPIDURAL; INFILTRATION; INTRACAUDAL; PERINEURAL AS NEEDED
Status: DISCONTINUED | OUTPATIENT
Start: 2018-04-18 | End: 2018-04-18 | Stop reason: SDUPTHER

## 2018-04-18 RX ORDER — OXYCODONE AND ACETAMINOPHEN 5; 325 MG/1; MG/1
1 TABLET ORAL AS NEEDED
Status: DISCONTINUED | OUTPATIENT
Start: 2018-04-18 | End: 2018-04-18 | Stop reason: SDUPTHER

## 2018-04-18 RX ORDER — GLYCOPYRROLATE 0.2 MG/ML
INJECTION INTRAMUSCULAR; INTRAVENOUS AS NEEDED
Status: DISCONTINUED | OUTPATIENT
Start: 2018-04-18 | End: 2018-04-18 | Stop reason: HOSPADM

## 2018-04-18 RX ORDER — MIDAZOLAM HYDROCHLORIDE 1 MG/ML
INJECTION, SOLUTION INTRAMUSCULAR; INTRAVENOUS AS NEEDED
Status: DISCONTINUED | OUTPATIENT
Start: 2018-04-18 | End: 2018-04-18 | Stop reason: HOSPADM

## 2018-04-18 RX ORDER — KETOROLAC TROMETHAMINE 30 MG/ML
15 INJECTION, SOLUTION INTRAMUSCULAR; INTRAVENOUS EVERY 6 HOURS
Status: COMPLETED | OUTPATIENT
Start: 2018-04-18 | End: 2018-04-19

## 2018-04-18 RX ORDER — DIPHENHYDRAMINE HYDROCHLORIDE 50 MG/ML
12.5 INJECTION, SOLUTION INTRAMUSCULAR; INTRAVENOUS
Status: ACTIVE | OUTPATIENT
Start: 2018-04-18 | End: 2018-04-19

## 2018-04-18 RX ORDER — NALOXONE HYDROCHLORIDE 0.4 MG/ML
0.4 INJECTION, SOLUTION INTRAMUSCULAR; INTRAVENOUS; SUBCUTANEOUS AS NEEDED
Status: DISCONTINUED | OUTPATIENT
Start: 2018-04-18 | End: 2018-04-21 | Stop reason: HOSPADM

## 2018-04-18 RX ORDER — NEOSTIGMINE METHYLSULFATE 1 MG/ML
INJECTION INTRAVENOUS AS NEEDED
Status: DISCONTINUED | OUTPATIENT
Start: 2018-04-18 | End: 2018-04-18 | Stop reason: HOSPADM

## 2018-04-18 RX ORDER — MORPHINE SULFATE 10 MG/ML
2 INJECTION, SOLUTION INTRAMUSCULAR; INTRAVENOUS
Status: DISCONTINUED | OUTPATIENT
Start: 2018-04-18 | End: 2018-04-18 | Stop reason: HOSPADM

## 2018-04-18 RX ORDER — SODIUM CHLORIDE, SODIUM LACTATE, POTASSIUM CHLORIDE, CALCIUM CHLORIDE 600; 310; 30; 20 MG/100ML; MG/100ML; MG/100ML; MG/100ML
125 INJECTION, SOLUTION INTRAVENOUS CONTINUOUS
Status: DISCONTINUED | OUTPATIENT
Start: 2018-04-18 | End: 2018-04-18 | Stop reason: SDUPTHER

## 2018-04-18 RX ORDER — ENOXAPARIN SODIUM 100 MG/ML
40 INJECTION SUBCUTANEOUS EVERY 24 HOURS
Status: DISCONTINUED | OUTPATIENT
Start: 2018-04-19 | End: 2018-04-21 | Stop reason: HOSPADM

## 2018-04-18 RX ORDER — OXYCODONE AND ACETAMINOPHEN 5; 325 MG/1; MG/1
1 TABLET ORAL AS NEEDED
Status: DISCONTINUED | OUTPATIENT
Start: 2018-04-18 | End: 2018-04-18 | Stop reason: HOSPADM

## 2018-04-18 RX ADMIN — HYOSCYAMINE SULFATE 0.12 MG: 0.12 TABLET ORAL; SUBLINGUAL at 20:56

## 2018-04-18 RX ADMIN — SODIUM CHLORIDE, SODIUM LACTATE, POTASSIUM CHLORIDE, CALCIUM CHLORIDE: 600; 310; 30; 20 INJECTION, SOLUTION INTRAVENOUS at 08:00

## 2018-04-18 RX ADMIN — NALOXONE HYDROCHLORIDE 0.08 MG: 0.4 INJECTION, SOLUTION INTRAMUSCULAR; INTRAVENOUS; SUBCUTANEOUS at 11:00

## 2018-04-18 RX ADMIN — ONDANSETRON 4 MG: 2 INJECTION INTRAMUSCULAR; INTRAVENOUS at 09:25

## 2018-04-18 RX ADMIN — SODIUM CHLORIDE, SODIUM LACTATE, POTASSIUM CHLORIDE, AND CALCIUM CHLORIDE 125 ML/HR: 600; 310; 30; 20 INJECTION, SOLUTION INTRAVENOUS at 07:45

## 2018-04-18 RX ADMIN — GLYCOPYRROLATE 0.8 MG: 0.2 INJECTION INTRAMUSCULAR; INTRAVENOUS at 10:25

## 2018-04-18 RX ADMIN — FENTANYL CITRATE 25 MCG: 50 INJECTION, SOLUTION INTRAMUSCULAR; INTRAVENOUS at 16:20

## 2018-04-18 RX ADMIN — LIDOCAINE HYDROCHLORIDE 100 MG: 20 INJECTION, SOLUTION EPIDURAL; INFILTRATION; INTRACAUDAL; PERINEURAL at 08:33

## 2018-04-18 RX ADMIN — NEOSTIGMINE METHYLSULFATE 4 MG: 1 INJECTION INTRAVENOUS at 10:25

## 2018-04-18 RX ADMIN — ROCURONIUM BROMIDE 35 MG: 10 INJECTION, SOLUTION INTRAVENOUS at 08:40

## 2018-04-18 RX ADMIN — ACETAMINOPHEN 1000 MG: 500 TABLET, FILM COATED ORAL at 17:35

## 2018-04-18 RX ADMIN — FENTANYL CITRATE 100 MCG: 50 INJECTION, SOLUTION INTRAMUSCULAR; INTRAVENOUS at 08:52

## 2018-04-18 RX ADMIN — SODIUM CHLORIDE, SODIUM LACTATE, POTASSIUM CHLORIDE, AND CALCIUM CHLORIDE 125 ML/HR: 600; 310; 30; 20 INJECTION, SOLUTION INTRAVENOUS at 23:26

## 2018-04-18 RX ADMIN — PROPOFOL 200 MG: 10 INJECTION, EMULSION INTRAVENOUS at 08:33

## 2018-04-18 RX ADMIN — ONDANSETRON 4 MG: 2 INJECTION INTRAMUSCULAR; INTRAVENOUS at 20:57

## 2018-04-18 RX ADMIN — HYDROMORPHONE HYDROCHLORIDE 1 MG: 2 INJECTION, SOLUTION INTRAMUSCULAR; INTRAVENOUS; SUBCUTANEOUS at 09:25

## 2018-04-18 RX ADMIN — FENTANYL CITRATE 150 MCG: 50 INJECTION, SOLUTION INTRAMUSCULAR; INTRAVENOUS at 08:33

## 2018-04-18 RX ADMIN — HYOSCYAMINE SULFATE 0.12 MG: 0.12 TABLET SUBLINGUAL at 16:17

## 2018-04-18 RX ADMIN — ACETAMINOPHEN 1000 MG: 10 INJECTION, SOLUTION INTRAVENOUS at 08:40

## 2018-04-18 RX ADMIN — SODIUM CHLORIDE, SODIUM LACTATE, POTASSIUM CHLORIDE, AND CALCIUM CHLORIDE 125 ML/HR: 600; 310; 30; 20 INJECTION, SOLUTION INTRAVENOUS at 14:00

## 2018-04-18 RX ADMIN — HYDROMORPHONE HYDROCHLORIDE 0.5 MG: 2 INJECTION, SOLUTION INTRAMUSCULAR; INTRAVENOUS; SUBCUTANEOUS at 09:46

## 2018-04-18 RX ADMIN — KETOROLAC TROMETHAMINE 15 MG: 30 INJECTION, SOLUTION INTRAMUSCULAR at 17:35

## 2018-04-18 RX ADMIN — DEXAMETHASONE SODIUM PHOSPHATE 8 MG: 4 INJECTION, SOLUTION INTRA-ARTICULAR; INTRALESIONAL; INTRAMUSCULAR; INTRAVENOUS; SOFT TISSUE at 09:25

## 2018-04-18 RX ADMIN — GABAPENTIN 300 MG: 300 CAPSULE ORAL at 17:35

## 2018-04-18 RX ADMIN — HYDRALAZINE HYDROCHLORIDE 10 MG: 20 INJECTION INTRAMUSCULAR; INTRAVENOUS at 12:08

## 2018-04-18 RX ADMIN — GABAPENTIN 500 MG: 250 SOLUTION ORAL at 08:26

## 2018-04-18 RX ADMIN — MIDAZOLAM HYDROCHLORIDE 2 MG: 1 INJECTION, SOLUTION INTRAMUSCULAR; INTRAVENOUS at 08:24

## 2018-04-18 RX ADMIN — ONDANSETRON 4 MG: 2 INJECTION INTRAMUSCULAR; INTRAVENOUS at 17:35

## 2018-04-18 RX ADMIN — SUCCINYLCHOLINE CHLORIDE 160 MG: 20 INJECTION INTRAMUSCULAR; INTRAVENOUS at 08:33

## 2018-04-18 RX ADMIN — Medication 10 ML: at 22:00

## 2018-04-18 RX ADMIN — ROCURONIUM BROMIDE 5 MG: 10 INJECTION, SOLUTION INTRAVENOUS at 08:33

## 2018-04-18 RX ADMIN — LABETALOL HYDROCHLORIDE 10 MG: 5 INJECTION, SOLUTION INTRAVENOUS at 09:51

## 2018-04-18 RX ADMIN — LORAZEPAM 1 MG: 2 INJECTION INTRAMUSCULAR; INTRAVENOUS at 23:21

## 2018-04-18 RX ADMIN — NALOXONE HYDROCHLORIDE 0.08 MG: 0.4 INJECTION, SOLUTION INTRAMUSCULAR; INTRAVENOUS; SUBCUTANEOUS at 10:58

## 2018-04-18 RX ADMIN — Medication 10 ML: at 17:36

## 2018-04-18 RX ADMIN — KETOROLAC TROMETHAMINE 15 MG: 30 INJECTION, SOLUTION INTRAMUSCULAR at 23:21

## 2018-04-18 RX ADMIN — ENOXAPARIN SODIUM 40 MG: 40 INJECTION SUBCUTANEOUS at 08:08

## 2018-04-18 RX ADMIN — FENTANYL CITRATE 25 MCG: 50 INJECTION, SOLUTION INTRAMUSCULAR; INTRAVENOUS at 16:25

## 2018-04-18 RX ADMIN — SODIUM CHLORIDE, SODIUM LACTATE, POTASSIUM CHLORIDE, AND CALCIUM CHLORIDE 125 ML/HR: 600; 310; 30; 20 INJECTION, SOLUTION INTRAVENOUS at 08:00

## 2018-04-18 RX ADMIN — HYOSCYAMINE SULFATE 0.12 MG: 0.12 TABLET ORAL; SUBLINGUAL at 16:17

## 2018-04-18 RX ADMIN — ONDANSETRON 4 MG: 2 INJECTION INTRAMUSCULAR; INTRAVENOUS at 16:25

## 2018-04-18 NOTE — PERIOP NOTES
TRANSFER - OUT REPORT:    Verbal report given to Candace(name) on Yordy Vieira  being transferred to [facility](unit) for routine progression of care       Report consisted of patients Situation, Background, Assessment and   Recommendations(SBAR). Information from the following report(s) SBAR, OR Summary, Intake/Output and MAR was reviewed with the receiving nurse. Lines:   Peripheral IV 04/18/18 Right Hand (Active)   Site Assessment Clean, dry, & intact 4/18/2018 11:47 AM   Phlebitis Assessment 0 4/18/2018 11:47 AM   Infiltration Assessment 0 4/18/2018 11:47 AM   Dressing Status Clean, dry, & intact 4/18/2018 11:47 AM   Dressing Type Transparent 4/18/2018  8:13 AM   Hub Color/Line Status Infusing 4/18/2018  8:13 AM       Peripheral IV 04/18/18 Left Hand (Active)   Site Assessment Clean, dry, & intact 4/18/2018 11:47 AM   Phlebitis Assessment 0 4/18/2018 11:47 AM   Infiltration Assessment 0 4/18/2018 11:47 AM   Dressing Status Clean, dry, & intact 4/18/2018 11:47 AM   Dressing Type Transparent 4/18/2018  8:14 AM   Hub Color/Line Status Infusing 4/18/2018  8:14 AM        Opportunity for questions and clarification was provided.       Patient transported with:   [employer]

## 2018-04-18 NOTE — ANESTHESIA POSTPROCEDURE EVALUATION
Post-Anesthesia Evaluation and Assessment    Patient: Kenn Hinojosa MRN: [id number]  SSN: [id number]    YOB: 1980  Age: 34 y.o. Sex: female       Cardiovascular Function/Vital Signs  Visit Vitals    BP (!) 162/111    Pulse 95    Temp 36 °C (96.8 °F)    Resp 19    Ht 5' 8\" (1.727 m)    Wt 125.4 kg (276 lb 8 oz)    SpO2 100%    BMI 42.04 kg/m2       Patient is status post general anesthesia for Procedure(s):  ROBOTIC ASSISTED SLEEVE GASTRECTOMY WITH EGD  ESOPHAGOGASTRODUODENOSCOPY (EGD). Nausea/Vomiting: None    Postoperative hydration reviewed and adequate. Pain:  Pain Scale 1: Numeric (0 - 10) (04/18/18 0802)  Pain Intensity 1: 0 (04/18/18 0802)   Managed    Neurological Status:   Neuro (WDL): Within Defined Limits (04/18/18 0749)   At baseline    Mental Status and Level of Consciousness: Arousable    Pulmonary Status:   O2 Device: Nasal cannula;CO2 nasal cannula;02 face tent (04/18/18 1103)   Adequate oxygenation and airway patent    Complications related to anesthesia: None    Post-anesthesia assessment completed.  No concerns    Signed By: Rohith Garcia MD     April 18, 2018

## 2018-04-18 NOTE — PROGRESS NOTES
TRANSFER - IN REPORT:    Verbal report received from [address] [place] [address] on Arturo Fuentes  being received from PACU for routine progression of care      Report consisted of patients Situation, Background, Assessment and   Recommendations(SBAR). Information from the following report(s) SBAR, Kardex, OR Summary, Procedure Summary, Intake/Output, MAR, Recent Results and Cardiac Rhythm NSR was reviewed with the receiving nurse. Opportunity for questions and clarification was provided.

## 2018-04-18 NOTE — IP AVS SNAPSHOT
[address] [facility] [address] [place] [address] 
[phone] Patient: Tita Singleton MRN: [id number] :1980 A check kei indicates which time of day the medication should be taken. My Medications START taking these medications Instructions Each Dose to Equal  
 Morning Noon Evening Bedtime  
 hyoscyamine SL 0.125 mg SL tablet Commonly known as:  LEVSIN/SL Your last dose was: Your next dose is:    
   
   
 1 Tab by SubLINGual route every four (4) hours as needed (upper abdominal pressure / spasm associated with drinking). 0.125 mg  
    
   
   
   
  
 ondansetron 4 mg disintegrating tablet Commonly known as:  ZOFRAN ODT Your last dose was: Your next dose is: Take 1 Tab by mouth every eight (8) hours as needed for Nausea. 4 mg  
    
   
   
   
  
 oxyCODONE IR 5 mg immediate release tablet Commonly known as:  Santo Bers Your last dose was: Your next dose is: Take 1 Tab by mouth every six (6) hours as needed for Pain. Max Daily Amount: 20 mg.  
 5 mg CONTINUE taking these medications Instructions Each Dose to Equal  
 Morning Noon Evening Bedtime Cholecalciferol (Vitamin D3) 50,000 unit Cap Your last dose was: Your next dose is: Take  by mouth every seven (7) days. ergocalciferol 50,000 unit capsule Commonly known as:  VITAMIN D2 Your last dose was: Your next dose is: Take 1 Cap by mouth every Monday and Friday. 92541 Units  
    
   
   
   
  
 melatonin 5 mg Cap capsule Your last dose was: Your next dose is: Take 5 mg by mouth nightly as needed. 5 mg MULTIVITAMIN PO Your last dose was: Your next dose is: Take  by mouth daily.   
     
   
   
   
  
 NEBULIZER  
   
 Your last dose was: Your next dose is:    
   
   
 by Does Not Apply route as needed. omeprazole 20 mg capsule Commonly known as:  PRILOSEC Your last dose was: Your next dose is: Take 40 mg by mouth daily. 40 mg  
    
   
   
   
  
 * PROAIR HFA 90 mcg/actuation inhaler Generic drug:  albuterol Your last dose was: Your next dose is: Take 2 Puffs by inhalation as needed. 2 Puff * albuterol sulfate 2.5 mg/0.5 mL Nebu nebulizer solution Commonly known as:  PROVENTIL;VENTOLIN Your last dose was: Your next dose is:    
   
   
 2.5 mg by Nebulization route as needed for Wheezing. 2.5 mg  
    
   
   
   
  
 VITAMIN B-12 PO Your last dose was: Your next dose is: Take 3,000 mcg by mouth daily. 3000 mcg VITAMIN C PO Your last dose was: Your next dose is: Take 500 mg by mouth daily. 500 mg * Notice: This list has 2 medication(s) that are the same as other medications prescribed for you. Read the directions carefully, and ask your doctor or other care provider to review them with you. ASK your doctor about these medications Instructions Each Dose to Equal  
 Morning Noon Evening Bedtime Citracal + D tablet Generic drug:  calcium citrate-vitamin D3 Your last dose was: Your next dose is: Take 2 Tabs by mouth daily. 2 Tab Where to Get Your Medications Information on where to get these meds will be given to you by the nurse or doctor. ! Ask your nurse or doctor about these medications  
  hyoscyamine SL 0.125 mg SL tablet  
 ondansetron 4 mg disintegrating tablet  
 oxyCODONE IR 5 mg immediate release tablet

## 2018-04-18 NOTE — PROGRESS NOTES
Problem: Falls - Risk of  Goal: *Absence of Falls  Document Gianna Fall Risk and appropriate interventions in the flowsheet.    Outcome: Progressing Towards Goal  Fall Risk Interventions:            Medication Interventions: Patient to call before getting OOB, Teach patient to arise slowly

## 2018-04-18 NOTE — OP NOTES
Operative Note    Date of Surgery: 4/18/2018     Preoperative Diagnosis: Morbid obesity, sleep apnea, GERD, Asthma    Postoperative Diagnosis:  Same as above    Procedure(s):   laparoscopic vertical sleeve gastrectomy (robotic-assisted)     Intraoperative endoscopy         Surgeon: Chhaya West MD    Assistant(s): Nate Martinez    Anesthesia:  General     Estimated Blood Loss: 20 mL IV: LR 1300 mL Urine: n/aL    Findings:   vertical sleeve over 36 Fr ViSiGi tube      Endoscopy findings below        Drains: none         Specimens:   ID Type Source Tests Collected by Time Destination   1 : Sleeve Gastrectomy Fresh Stomach  Chhaya West MD 4/18/2018 1020 Pathology               Implants:   Implant Name Type Inv. Item Serial No.  Lot No. LRB No. Used                                                                       Complications:  None; patient tolerated the procedure well. Wound prophylaxis: Ancef given IV by anesthetist prior to incision    VTE prophylaxis: SCDs fitted and started prior to induction of anesthesia; Lovenox 40 mg SC given in preop holding area. Indications: This is a 34 y.o. female with a history of morbid obesity with multiple comorbidities. Body mass index is 42.04 kg/(m^2). Lisandra Terrell She has failed multiple attempts to lose weight. She has been approved for laparoscopic vertical sleeve gastrectomy. Procedure in Detail:  After confirming the procedure and obtaining informed consent,  the patient was brought to the operating room and positioned supine on the operating table. After induction of general endotracheal anesthesia, a ViSiGi tube was placed in the stomach and fluids aspirated. The patient's abdomen was prepped and draped in a sterile fashion. Procedure time out was held and all info confirmed. Local anesthetic was administered to the dermis and the fascia at all the port sites. An incision was made in midline above umbilicus then a Veress needle was introduced. Placement was confirmed with a saline drop test. Insufflation was applied to achieve pneumoperitoneum of 15 mmHg. A daVinci 8-mm port was inserted and pneumoperitoneum was maintained. Introduction of the laparoscope revealed no injury to the underlying viscera. The  remaining ports were placed under direct vision, including the [facility] liver retractor. The ViSiGi tube was repositioned under laparoscopic guidance. The patient was positioned in reverse Trendelenburg. The surgeon scrubbed out to sit at the console. The [employer] robotic arms were docked. Dissection at the left scar revealed no hiatal hernia. The pylorus was identified and a point approximately 6 cm proximal to the pylorus was identified for initiating the sleeve gastrectomy. The division of the gastroepiploic vessels was started at the greater curvature using the LigaSure device. This was continued on up to the fundus, including division of the short gastric arteries. Complete mobilization of the fundus was performed dividing posterior attachments, exposing the left scar of the diaphragm. At a point 6 cm from the pylorus, the stomach was divided using the covidien 60-mm stapler. The first application was performed with a purple cartridge without reinforcement. Then subsequent stapling was performed with purple cartridges using Kaye-Strip reinforcement. The ViSiGi tube was kept in place to facilitate sizing of the gastric sleeve and identification of the gastroesophageal junction. The stomach was completely divided at the angle of His about 1 - 2 cm lateral to the gastroesophageal junction. All staple lines were inspected for hemostasis and tissue approximation. The ViSiGi tube was removed after completing the stapling. . The robotic arms were undocked. Upper endoscopy was performed by Navid Middleton MD. The gastroscope was inserted into the mouth and advanced under direct vision to duodenal bulb.   A careful inspection was made as the gastroscope was withdrawn; findings are described below. Findings: There was no bleeding or obstruction seen. The outside of the stomach was irrigated with saline and no leak was observed. Esophagus:  Normal  Cardia of the stomach:  Normal  Body of the stomach:  Normal  Antrum of the stomach:  Normal  Duodenum:  Normal    The surgeon scrubbed back in. The liver retractor was removed and hemostasis confirmed. The assistant/stapler port site was dilated then the gastrectomy specimen was removed. A 0 Vicryl suture was placed with a suture passer. The pneumoperitoneum was released and the ports removed. The fascia was closed at the stapler port site with the 0 Vicryl suture. The skin was closed at all sites using Monocryl in a subcuticular fashion. The  incisions were dressed with Dermabond. Sponge and needle counts were confirmed. The patient was extubated and transferred to recovery in a stable condition. Disposition: PACU - hemodynamically stable.            Condition: stable    Signed By: Keisha Rojas MD                       April 18, 2018

## 2018-04-18 NOTE — ANESTHESIA PREPROCEDURE EVALUATION
Anesthetic History   No history of anesthetic complications            Review of Systems / Medical History  Patient summary reviewed, nursing notes reviewed and pertinent labs reviewed    Pulmonary  Within defined limits      Sleep apnea: No treatment    Asthma        Neuro/Psych   Within defined limits           Cardiovascular  Within defined limits                Exercise tolerance: >4 METS     GI/Hepatic/Renal  Within defined limits   GERD           Endo/Other  Within defined limits      Obesity     Other Findings            Physical Exam    Airway  Mallampati: III  TM Distance: 4 - 6 cm  Neck ROM: normal range of motion   Mouth opening: Normal     Cardiovascular  Regular rate and rhythm,  S1 and S2 normal,  no murmur, click, rub, or gallop             Dental  No notable dental hx       Pulmonary  Breath sounds clear to auscultation               Abdominal  GI exam deferred       Other Findings            Anesthetic Plan    ASA: 2  Anesthesia type: general

## 2018-04-18 NOTE — PROGRESS NOTES
Clinical Care Lead Arrival Summary        Name: Mratha Engel  MRN: [id number]  Date: 2018 2:15 PM  Admission Date: 2018  : 1980  Situation:  18 LAP SLEEVE GASTRECTOMY WITH EGD BY DR. Homar King    Background:     Medical History      Past Medical History Date Comments   Asthma [J45.909]     GERD (gastroesophageal reflux disease) [K21.9]     Hx gestational diabetes [Z86.32]     Morbid obesity due to excess calories ([facility] [address] [E66.01] 2016    S/P laparoscopic sleeve gastrectomy [Z98.84] 2018 robotic assisted, [facility]   Sleep apnea [G47.30]  NO LONGER USES CPAP   Trouble in sleeping [G47.9]        Surgical History      Past Surgical History Laterality Date Comments   UPPER GI ENDOSCOPY,BIOPSY[[id number]]  2017    COLONOSCOPY,DIAGNOSTIC[[id number]]  2017    COLONOSCOPY[[id number]] N/A 2017 COLONOSCOPY performed by Elza Wilson MD at [facility] ENDOSCOPY   HX [place]    [facility]   [address]  2018 robotic-assisted sleeve gastrectomy, Loly Calhoun      Social History      Category History   Smoking Status Former Smoker; Start date: ; Quit date: 2012; 0.25 packs/day for 10 years (2.5 pk-yrs)   Smokeless Tobacco Status Never Used   Alcohol Use Yes; (OCCASIONALLY)   Drug Use No   Sexually Active No   ADL Not Asked            The Beta blocker the patient is taking is [unfilled], NONE          STAND: POSTOP IF INDICATED  Voice quality/secretions:    Hx of dysphagia:    O2 sat:    Alertness:      Flu vaccination received for current season: SCREEN    VTE Documentation-BOTH CHEMICAL AND MECHANICAL  VTE Risk Assessment    Mechanical VTE orders: Mechanical VTE Orders: Yes  Venous Foot Pump:    Graduated Compression Stockings:    Sequential Compression Devices: Sequential Compression Device: Bilateral  Patient Refused VTE:        Diet:                Assessment:    Lines/Drains/Airways:   Peripheral IV 18 Right Hand (Active)   Site Assessment Clean, dry, & intact 4/18/2018 11:47 AM   Phlebitis Assessment 0 4/18/2018 11:47 AM   Infiltration Assessment 0 4/18/2018 11:47 AM   Dressing Status Clean, dry, & intact 4/18/2018 11:47 AM   Dressing Type Transparent 4/18/2018  8:13 AM   Hub Color/Line Status Infusing 4/18/2018  8:13 AM       Peripheral IV 04/18/18 Left Hand (Active)   Site Assessment Clean, dry, & intact 4/18/2018 11:47 AM   Phlebitis Assessment 0 4/18/2018 11:47 AM   Infiltration Assessment 0 4/18/2018 11:47 AM   Dressing Status Clean, dry, & intact 4/18/2018 11:47 AM   Dressing Type Transparent 4/18/2018  8:14 AM   Hub Color/Line Status Infusing 4/18/2018  8:14 AM       Wound Abdomen Anterior (Active)   DRESSING STATUS Clean, dry, and intact 4/18/2018 11:47 AM   DRESSING TYPE Topical skin adhesive/glue 4/18/2018 11:47 AM   Incision site well approximated?  Yes 4/18/2018 11:00 AM       Last 3 Weights:  Last 3 Recorded Weights in this Encounter    04/18/18 0802   Weight: 125.4 kg (276 lb 8 oz)   DAILY STANDING WEIGHTS ON TELEMETRY    Recommendations: POSTOP SLEEVE PATHWAY  Consult Orders:   Recent orders:  VERIFY CONSENT  APPLY/MAINTAIN SEQUENTIAL COMPRESSION DEVICE  NOTIFY ANESTHESIA  NOTIFY ANESTHESIA  APPLY/MAINTAIN SEQUENTIAL COMPRESSION DEVICE  INITIAL PHYSICIAN ORDER: INPATIENT  SALINE LOCK IV  SALINE LOCK IV  TRANSFER PATIENT  FULL CODE  POC GLUCOSE  POC GLUCOSE  VITAL SIGNS PER UNIT ROUTINE  AMBULATE WITH ASSISTANCE  OUT OF BED IN CHAIR  ELEVATE HEAD OF BED  NOTIFY PROVIDER: VITAL SIGNS CHANGES  CARDIAC MONITORING  BLADDER CHECKS  INTAKE AND OUTPUT  INCENTIVE SPIROMETRY  MAY HAVE ICE CHIPS  DIET BARIATRIC LIQUID  SALINE LOCK IV  IP CONSULT TO NUTRITION SERVICES    Core Measures Compliant   CHF:NA   Pneumonia:NA   Georgiana Gibson   SCIP:YES   Stevenson:NA   AMI:NA

## 2018-04-18 NOTE — PERIOP NOTES
0.9% NORMAL SALINE, 1 LITER, USED PRN ON STERILE FIELD.    1 LITER STERILE SALINE USED PRN VIA SUCTION .

## 2018-04-18 NOTE — INTERVAL H&P NOTE
H&P Update:  Josh Jim was seen and examined. History and physical has been reviewed. The patient has been examined.  There have been no significant clinical changes since the completion of the originally dated History and Physical.    Signed By: Branden Gupta MD     April 18, 2018 8:23 AM

## 2018-04-18 NOTE — BRIEF OP NOTE
BRIEF OPERATIVE NOTE    Date of Procedure: 4/18/2018   Preoperative Diagnosis: MORBID OBESITY  Postoperative Diagnosis: MORBID OBESITY    Procedure(s):  ROBOTIC ASSISTED SLEEVE GASTRECTOMY WITH EGD  ESOPHAGOGASTRODUODENOSCOPY (EGD)  Surgeon(s) and Role:     * Ruperto Taylor MD - Primary         Surgical Assistant: Leander Rodríguez CSA    Surgical Staff:  Circ-1: Diomedes Awad RN  Circ-Relief: Demarcus Cardenas RN  Scrub RN-1: Dalia Haro RN  Surg Asst-1: Lakesha Garza  Surg Asst-2: Martina Cheema  Event Time In   Incision Start 1947   Incision Close      Anesthesia: General   Estimated Blood Loss: 20 mL IV: LR 1300 mL  Specimens:   ID Type Source Tests Collected by Time Destination   1 : Sleeve Gastrectomy Fresh Stomach  Ruperto Taylor MD 4/18/2018 1020 Pathology      Findings: vertical sleeve over 39 Fr ViSiGi tube     Complications: none  Implants: * No implants in log *

## 2018-04-18 NOTE — PERIOP NOTES
Nery from [employer]. Pad on left lateral thigh. Set on 3. Pad site clear, no redness or swelling noted.

## 2018-04-18 NOTE — PROGRESS NOTES
Primary Nurse Allison Reeves and Dariana Mcknight, STEFANIE performed a dual skin assessment on this patient No impairment noted  Nelson score is 22      Bedside and Verbal shift change report given to [employer] (oncoming nurse) by Naren Cedillo  (offgoing nurse). Report included the following information SBAR, Kardex, Procedure Summary, Intake/Output and Recent Results.

## 2018-04-18 NOTE — IP AVS SNAPSHOT
2700 [facility] [address] [place] [address] 
[phone] Patient: Siddhartha Phillips MRN: [id number] :1980 About your hospitalization You were admitted on:  2018 You last received care in the:  [facility] 4 SURG/BARIATRICS You were discharged on:  2018 Why you were hospitalized Your primary diagnosis was: Morbid Obesity Due To Excess Calories (Hcc) Your diagnoses also included:  Gerd (Gastroesophageal Reflux Disease), Asthma, Sleep Apnea, S/P Laparoscopic Sleeve Gastrectomy Follow-up Information Follow up With Details Comments Contact Info Patti Castelan MD   38[address] [facility] 
[phone] Chelsea Calhoun NP On 5/3/2018 1 pm  [address] [address] [address] [place] [address] 
[phone] Your Scheduled Appointments Thursday May 03, 2018  1:00 PM EDT  
POST OP 10 MIN with Chelsea Calhoun NP  
[facility] 22 416 ([address]) [address] [place] [phone]  
[phone] Wednesday May 23, 2018  4:00 PM EDT  
POST OP 10 MIN with Pelon Martinez NP  
[facility] 22 092 ([address]) [address] [place] [phone]  
[phone] 2018  4:00 PM EDT  
POST OP 10 MIN with Pelon Martinez NP  
[facility] 22 063 ([address]) [address] [place] [address] 7 [phone]  
[phone] Discharge Orders None A check kei indicates which time of day the medication should be taken. My Medications START taking these medications Instructions Each Dose to Equal  
 Morning Noon Evening Bedtime  
 hyoscyamine SL 0.125 mg SL tablet Commonly known as:  LEVSIN/SL Your last dose was: Your next dose is: 1 Tab by SubLINGual route every four (4) hours as needed (upper abdominal pressure / spasm associated with drinking). 0.125 mg  
    
   
   
   
  
 ondansetron 4 mg disintegrating tablet Commonly known as:  ZOFRAN ODT Your last dose was: Your next dose is: Take 1 Tab by mouth every eight (8) hours as needed for Nausea. 4 mg  
    
   
   
   
  
 oxyCODONE IR 5 mg immediate release tablet Commonly known as:  Nirav Nix Your last dose was: Your next dose is: Take 1 Tab by mouth every six (6) hours as needed for Pain. Max Daily Amount: 20 mg.  
 5 mg CONTINUE taking these medications Instructions Each Dose to Equal  
 Morning Noon Evening Bedtime Cholecalciferol (Vitamin D3) 50,000 unit Cap Your last dose was: Your next dose is: Take  by mouth every seven (7) days. ergocalciferol 50,000 unit capsule Commonly known as:  VITAMIN D2 Your last dose was: Your next dose is: Take 1 Cap by mouth every Monday and Friday. [address] Units  
    
   
   
   
  
 melatonin 5 mg Cap capsule Your last dose was: Your next dose is: Take 5 mg by mouth nightly as needed. 5 mg MULTIVITAMIN PO Your last dose was: Your next dose is: Take  by mouth daily. NEBULIZER Your last dose was: Your next dose is:    
   
   
 by Does Not Apply route as needed. omeprazole 20 mg capsule Commonly known as:  PRILOSEC Your last dose was: Your next dose is: Take 40 mg by mouth daily. 40 mg  
    
   
   
   
  
 * PROAIR HFA 90 mcg/actuation inhaler Generic drug:  albuterol Your last dose was: Your next dose is: Take 2 Puffs by inhalation as needed. 2 Puff * albuterol sulfate 2.5 mg/0.5 mL Nebu nebulizer solution Commonly known as:  PROVENTIL;VENTOLIN Your last dose was: Your next dose is:    
   
   
 2.5 mg by Nebulization route as needed for Wheezing. 2.5 mg  
    
   
   
   
  
 VITAMIN B-12 PO Your last dose was: Your next dose is: Take 3,000 mcg by mouth daily. 3000 mcg VITAMIN C PO Your last dose was: Your next dose is: Take 500 mg by mouth daily. 500 mg * Notice: This list has 2 medication(s) that are the same as other medications prescribed for you. Read the directions carefully, and ask your doctor or other care provider to review them with you. ASK your doctor about these medications Instructions Each Dose to Equal  
 Morning Noon Evening Bedtime Citracal + D tablet Generic drug:  calcium citrate-vitamin D3 Your last dose was: Your next dose is: Take 2 Tabs by mouth daily. 2 Tab Where to Get Your Medications Information on where to get these meds will be given to you by the nurse or doctor. ! Ask your nurse or doctor about these medications  
  hyoscyamine SL 0.125 mg SL tablet  
 ondansetron 4 mg disintegrating tablet  
 oxyCODONE IR 5 mg immediate release tablet Opioid Education Prescription Opioids: What You Need to Know: 
 
 
After general anesthesia or intravenous sedation, for 24 hours or while taking prescription Narcotics: 
Limit your activities Do not drive and operate hazardous machinery Do not make important personal or business decisions Do  not drink alcoholic beverages If you have not urinated within 8 hours after discharge, please contact your surgeon on call. Report the following to your surgeon: Excessive pain, swelling, redness or odor of or around the surgical area Temperature over 100.5 Nausea and vomiting lasting longer than 4 hours or if unable to take medications Any signs of decreased circulation or nerve impairment to extremity: change in color, persistent  numbness, tingling, coldness or increase pain Any questions What to do at Home: *  Please give a list of your current medications to your Primary Care Provider. *  Please update this list whenever your medications are discontinued, doses are 
    changed, or new medications (including over-the-counter products) are added. *  Please carry medication information at all times in case of emergency situations. These are general instructions for a healthy lifestyle: No smoking/ No tobacco products/ Avoid exposure to second hand smoke Surgeon General's Warning:  Quitting smoking now greatly reduces serious risk to your health. Obesity, smoking, and sedentary lifestyle greatly increases your risk for illness A healthy diet, regular physical exercise & weight monitoring are important for maintaining a healthy lifestyle You may be retaining fluid if you have a history of heart failure or if you experience any of the following symptoms:  Weight gain of 3 pounds or more overnight or 5 pounds in a week, increased swelling in our hands or feet or shortness of breath while lying flat in bed. Please call your doctor as soon as you notice any of these symptoms; do not wait until your next office visit. Recognize signs and symptoms of STROKE: 
 
F-face looks uneven A-arms unable to move or move unevenly S-speech slurred or non-existent T-time-call 911 as soon as signs and symptoms begin-DO NOT go Back to bed or wait to see if you get better-TIME IS BRAIN. Warning Signs of HEART ATTACK Call 911 if you have these symptoms: 
Chest discomfort. Most heart attacks involve discomfort in the center of the chest that lasts more than a few minutes, or that goes away and comes back. It can feel like uncomfortable pressure, squeezing, fullness, or pain. Discomfort in other areas of the upper body. Symptoms can include pain or discomfort in one or both arms, the back, neck, jaw, or stomach. Shortness of breath with or without chest discomfort. Other signs may include breaking out in a cold sweat, nausea, or lightheadedness. Don't wait more than five minutes to call [address]! Fast action can save your life. Calling 911 is almost always the fastest way to get lifesaving treatment. Emergency Medical Services staff can begin treatment when they arrive  up to an hour sooner than if someone gets to the hospital by car. The discharge information has been reviewed with the patient. The patient verbalized understanding. Discharge medications reviewed with the patient and appropriate educational materials and side effects teaching were provided. ___________________________________________________________________________________________________________________________________[facility] [place] [facility] at [address] 
Procedure:  Laparoscopic Sleeve Gastrectomy You have  a 2 week follow up appointment Future Appointments Date Time Provider Jose Cervantes 5/3/2018 1:00 PM ARLET Holm  
5/23/2018 4:00 PM ARLET Martinez  
6/6/2018 4:00 PM ARLET Martinez It is common to experience reflux or heartburn after sleeve gastrectomy. These symptoms are generally self-limited and resolve over several weeks to couple of months. your doctor may have prescribed medication to prevent these symptoms.  If you experience reflux and feel it is limiting your fluid intake call your doctor immediately so that it can be effectively treated. Please remember that you are on ONLY LIQUIDS for the first 2 weeks after surgery until you are seen in our office. You should be drinking plenty of no calorie, non carbonated liquids through out the day and your meals are going to be a shake, the same type you did for your 2 week pre-op diet. Do not advance your diet until you are seen in our office. [address] [place]  
[phone] Laparoscopic Sleeve Gastrectomy: What to Expect at Home Your Recovery A sleeve gastrectomy is a  surgery to make the stomach smaller. It is done to help people lose weight. The surgery limits the amount of food the stomach can hold. This helps you eat less and feel full sooner. The cuts (incisions) the doctor made in your belly will probably be sore for several days after the surgery. You probably will lose weight very quickly in the first few months after surgery. As time goes on, your weight loss will slow down. You can expect most of your weight loss to happen in the first 12 months after your surgery. You will have regular doctor's appointments during this time to check how you are doing. It is important to think of this surgery as a tool to help you lose weight. It is not an instant fix. You will still need to eat a healthy diet and get regular exercise. This will help you reach your weight goal and avoid regaining the weight you lose. It is common to have many different emotions after this surgery. You may feel happy or excited as you begin to lose weight. But you may also feel overwhelmed or frustrated by the changes that you have to make in your diet, activity, and lifestyle. Talk with your doctor if you have concerns or questions.  
This care sheet gives you a general idea about how long it will take for you to recover. But each person recovers at a different pace. Follow the steps below to get better as quickly as possible. How can you care for yourself at home? Activity · Rest when you feel tired. Getting enough sleep will help you recover. · Try to walk each day. Start by walking a little more than you did the day before. Bit by bit, increase the amount you walk. Walking boosts blood flow and helps prevent pneumonia and constipation. · Avoid strenuous activities, such as bicycle riding, jogging, weight lifting, or aerobic exercise, until your doctor says it is okay. · Until your doctor says it is okay, avoid lifting anything that would make you strain. This may include heavy grocery bags and milk containers, a heavy briefcase or backpack, cat litter or dog food bags, a vacuum , or a child. · Hold a pillow over your incisions when you cough or take deep breaths. This will support your belly and decrease your pain. · Do breathing exercises at home as instructed by your doctor. This will help prevent pneumonia. · You can drive when you are no longer taking narcotic pain medications and feel comfortable sitting in a car. · You will probably need to take 2 to 4 weeks off from work. It depends on the type of work you do and how you feel. You will probably return to normal activities within 3 to 5 weeks. · You may shower, if your doctor okays it. Pat the incisions dry. Do not take a bath for the first 2 weeks, or until your doctor tells you it is okay. Diet · Your doctor will give you specific instructions about what to eat after the surgery. For the first 2 weeks, you will need to follow a liquid diet only. DO NOT ADVANCE TO SOFT FOOD UNTIL CLEARED BY YOUR DOCTOR. · Your doctor may recommend that you work with a dietitian to plan healthy meals that give you enough protein, vitamins, and minerals while you are losing weight.  Even with a healthy diet, you probably will need to take vitamin and mineral supplements for the rest of your life. · At first you may feel full after just a few sips of water or other liquid. It is important to try to sip water throughout the day to avoid becoming dehydrated. · You may notice that your bowel movements are not regular right after your surgery. This is common. Try to avoid constipation and straining with bowel movements. · Sometimes the stomach empties food into the small intestine too quickly. This is called dumping syndrome. It can cause diarrhea and make you feel faint, shaky, and nauseated. It also can make it hard for your body to get enough nutrition. ¨  Avoid high-sugar foods, or use products that have artificial sweeteners. ¨ Do not drink liquids within a half hour before eating and up to an hour after eating. ¨  
¨ Eat slowly. Try to chew each bite about 20 times. Allow 20 to 30 minutes for each meal. 
 Eat 3-4 4 ounce small meals or snacks a day. Medicines · Take pain medicines exactly as directed. ¨ If the doctor gave you a prescription medicine for pain, take it as prescribed. ¨ If you are not taking a prescription pain medicine, ask your doctor if you can take an over-the-counter medicine. ¨ Do not take two or more pain medicines at the same time unless the doctor told you to. Many pain medicines have acetaminophen, which is Tylenol. Too much acetaminophen (Tylenol) can be harmful. · If you think your pain medicine is making you sick to your stomach: 
¨ Take your medicine after meals (unless your doctor has told you not to). Incision care · Your incisions have a waterproof clue dressing on them that will peel off in 2 weeks. · Wash the area daily with warm, soapy water, and pat it dry. Other cleaning products, such as hydrogen peroxide, can make the wound heal more slowly. You may cover the area with a gauze bandage if it weeps or rubs against clothing. Change the bandage every day. · Keep the area clean and dry. Follow-up care is a key part of your treatment and safety. Be sure to make and go to all appointments, and call your doctor if you are having problems. Itâs also a good idea to know your test results and keep a list of the medicines you take. When should you call for help? Call 911 anytime you think you may need emergency care. For example, call if: 
· You pass out (lose consciousness). · You have severe trouble breathing. · You have sudden chest pain and shortness of breath, or you cough up blood. · You have severe belly pain. Call your doctor now or seek immediate medical care if: 
· You are sick to your stomach or cannot keep fluids down. · You have pain that does not get better after you take pain medicine. · You have a fever over 101°F. 
· You have loose stitches, or any of your incisions come open. · Bright red blood has soaked through the bandages over your incisions. · You have signs of infection, such as: 
¨ Increased pain, swelling, warmth, or redness. ¨ Red streaks leading from the incisions. ¨ Pus draining from the incision. ¨ Swollen lymph nodes in your neck, armpits, or groin. ¨ A fever. · You have signs of needing more fluids. You have sunken eyes and a dry mouth, and you pass only a little dark urine. Watch closely for changes in your health, and be sure to contact your doctor if you have any problems. Where can you learn more? Go to WeYAP.be. Enter Y354 in the search box to learn more about \"Laparoscopic Devan-en-Y Gastric Bypass: What to Expect at Home. \"  
© 7762-1992 Healthwise, Incorporated. Care instructions adapted under license by [facility] myDocket (which disclaims liability or warranty for this information).  This care instruction is for use with your licensed healthcare professional. If you have questions about a medical condition or this instruction, always ask your healthcare professional. Reza Davey disclaims any warranty or liability for your use of this information. Introducing [facility]! [employer] introduces [employer] patient portal. Now you can access parts of your medical record, email your doctor's office, and request medication refills online. 1. In your internet browser, go to [email or web address] 2. Click on the First Time User? Click Here link in the Sign In box. You will see the New Member Sign Up page. 3. Enter your [employer] Access Code exactly as it appears below. You will not need to use this code after youve completed the sign-up process. If you do not sign up before the expiration date, you must request a new code. · [employer] Access Code: [email or web address] Expires: 5/24/2018 12:09 PM 
 
4. Enter the last four digits of your Social Security Number (xxxx) and Date of Birth (mm/dd/yyyy) as indicated and click Submit. You will be taken to the next sign-up page. 5. Create a [employer] ID. This will be your [employer] login ID and cannot be changed, so think of one that is secure and easy to remember. 6. Create a [employer] password. You can change your password at any time. 7. Enter your Password Reset Question and Answer. This can be used at a later time if you forget your password. 8. Enter your e-mail address. You will receive e-mail notification when new information is available in [address] 9. Click Sign Up. You can now view and download portions of your medical record. 10. Click the Download Summary menu link to download a portable copy of your medical information. If you have questions, please visit the Frequently Asked Questions section of the [employer] website. Remember, [employer] is NOT to be used for urgent needs. For medical emergencies, dial 911. Now available from your iPhone and Android! Introducing [facility] [employer] As a [employer] patient, I wanted to make you aware of our electronic visit tool called [employer]. [employer] 24/7 allows you to connect within minutes with a medical provider 24 hours a day, seven days a week via a mobile device or tablet or logging into a secure website from your computer. You can access [employer] from anywhere in the [place]. A virtual visit might be right for you when you have a simple condition and feel like you just dont want to get out of bed, or cant get away from work for an appointment, when your regular [employer] provider is not available (evenings, weekends or holidays), or when youre out of town and need minor care. Electronic visits cost only $49 and if the [employer] 24/7 provider determines a prescription is needed to treat your condition, one can be electronically transmitted to a nearby pharmacy*. Please take a moment to enroll today if you have not already done so. The enrollment process is free and takes just a few minutes. To enroll, please download the [employer] 24/7 mateo to your tablet or phone, or visit [email or web address] to enroll on your computer. And, as an [address] [place] [address] patient with a [employer] account, the results of your visits will be scanned into your electronic medical record and your primary care provider will be able to view the scanned results. We urge you to continue to see your regular [employer] provider for your ongoing medical care. And while your primary care provider may not be the one available when you seek a [employer] virtual visit, the peace of mind you get from getting a real diagnosis real time can be priceless. For more information on [employer], view our Frequently Asked Questions (FAQs) at [email or web address]. Sincerely, 
 
Becka Sexton MD 
Chief Medical Officer [place] [employer] *:  certain medications cannot be prescribed via [employer] Providers Seen During Your Hospitalization Provider Specialty Primary office phone Mandi Yu, [address] [facility] [place] [place] Surgery [phone] Your Primary Care Physician (PCP) Primary Care Physician Office Phone Office Fax Maria Esther Cook [phone] You are allergic to the following No active allergies Recent Documentation Height Weight BMI OB Status Smoking Status 1.727 m 117.8 kg 39.49 kg/m2 Having regular periods Former Smoker Emergency Contacts Name Discharge Info Relation Home Work Mobile [address] CAREGIVER [3] Child [2] [phone] Patient Belongings The following personal items are in your possession at time of discharge: 
  Dental Appliances: Lowers, Partials, Uppers  Visual Aid: Glasses      Home Medications: None   Jewelry: None  Clothing: Other (comment) (clothes bag to pacu)    Other Valuables: None Discharge Instructions Attachments/References MEFS - OXYCODONE/ACETAMINOPHEN (PERCOCET, ROXICET) - (BY MOUTH) (ENGLISH) MEFS - ONDANSETRON (ZOFRAN, ZOFRAN ODT, ZUPLENZ) - (BY MOUTH, INTO THE MOUTH) (ENGLISH) HYOSCYAMINE (BY MOUTH) (ENGLISH) Patient Handouts Oxycodone/Acetaminophen (Percocet, Roxicet) - (By mouth) Why this medicine is used:  
Treats pain. This medicine contains a narcotic pain reliever. Contact a nurse or doctor right away if you have: 
· Extreme weakness, shallow breathing, slow heartbeat · Sweating or cold, clammy skin · Skin blisters, rash, or peeling Common side effects: 
· Constipation · Nausea, vomiting · Tiredness © 2017 [facility] Information is for End User's use only and may not be sold, redistributed or otherwise used for commercial purposes. Ondansetron (Zofran, Zofran ODT, Zuplenz) - (By mouth, Into the mouth) Why this medicine is used:  
Prevents nausea and vomiting. Contact a nurse or doctor right away if you have: 
· Fast, pounding, or uneven heartbeat · Lightheadedness or fainting · Trouble breathing Common side effects: 
· Headache, tiredness · Constipation, diarrhea © 2017 [facility] Information is for End User's use only and may not be sold, redistributed or otherwise used for commercial purposes. Hyoscyamine (By mouth) Hyoscyamine (cda-eo-JRB-a-meen) Reduces muscle activity, including muscle spasms in the digestive system. Dries and reduces secretions, such as reducing acid in the stomach. May treat allergy symptoms and several other conditions. Brand Name(s): Anaspaz, Ed-Spaz, Hyosyne, Levbid, Levsin, Levsin/SL, NuLev, Oscimin, Oscimin-SR, Symax DuoTab, Symax FasTabs, Symax-SL, Symax-SR There may be other brand names for this medicine. When This Medicine Should Not Be Used: This medicine is not right for everyone. Do not use it if you had an allergic reaction to hyoscyamine. How to Use This Medicine:  
Tablet, Spray, Chewable Tablet, Dissolving Tablet, Long Acting Tablet, Long Acting Capsule, Liquid · Your doctor will tell you how much medicine to use. Do not use more than directed. · Measure the oral liquid medicine with a marked measuring spoon, oral syringe, or medicine cup. If the medicine came with a dropper, use the dropper to measure each dose. · Make sure your hands are dry before you handle the disintegrating tablet. Peel back the foil from the blister pack, then remove the tablet. Do not push the tablet through the foil. Place the tablet in your mouth. After it has melted, swallow or take a drink of water. · Swallow the extended-release tablet whole. Do not crush, break, or chew it. · Tablet: You might need to take the regular tablet about 30 minutes to 1 hour before you eat a meal. Ask your pharmacist about your specific brand. · Missed dose: Take a dose as soon as you remember. If it is almost time for your next dose, wait until then and take a regular dose. Do not take extra medicine to make up for a missed dose. · Store the medicine in a closed container at room temperature, away from heat, moisture, and direct light. Drugs and Foods to Avoid: Ask your doctor or pharmacist before using any other medicine, including over-the-counter medicines, vitamins, and herbal products. · Some foods and medicines can affect how hyoscyamine works. Tell your doctor if you are also using any of the following: ¨ Amantadine, ketoconazole, haloperidol, metoclopramide, potassium supplement ¨ Antihistamine ¨ Narcotic pain medicine ¨ Phenothiazine medicine, including chlorpromazine, perphenazine, promethazine, prochlorperazine, thioridazine ¨ MAO inhibitor ¨ Tricyclic antidepressant ¨ Medicine to treat or prevent diarrhea · If you take an antacid, do not take it at the same time you take hyoscyamine. Take your hyoscyamine before a meal and then the antacid after the meal. 
Warnings While Using This Medicine: · Tell your doctor if you are pregnant or breastfeeding, or if you have glaucoma, trouble urinating, myasthenia gravis, overactive thyroid, kidney disease, high blood pressure, heart rhythm problems, heart disease, or autonomic neuropathy. Tell your doctor about all digestion problems, including colitis, reflux disease (GERD), blocked intestines, or gastric ulcer. · This medicine may cause you to sweat less and overheat. Avoid hot temperatures. · This medicine may make you dizzy or drowsy or give you blurred vision. Do not drive or do anything else that could be dangerous until you know how this medicine affects you. · Keep all medicine out of the reach of children. Never share your medicine with anyone. Possible Side Effects While Using This Medicine:  
Call your doctor right away if you notice any of these side effects: · Allergic reaction: Itching or hives, swelling in your face or hands, swelling or tingling in your mouth or throat, chest tightness, trouble breathing · Blurred vision that does not go away · Fast heartbeat, dizziness · Unusual behavior, such as confusion, memory loss, anxiety, trouble sleeping, or hallucinations If you notice these less serious side effects, talk with your doctor: · Dry mouth If you notice other side effects that you think are caused by this medicine, tell your doctor. Call your doctor for medical advice about side effects. You may report side effects to FDA at 2-999-FDA-4600 © 2017 [address] Information is for End User's use only and may not be sold, redistributed or otherwise used for commercial purposes. The above information is an  only. It is not intended as medical advice for individual conditions or treatments. Talk to your doctor, nurse or pharmacist before following any medical regimen to see if it is safe and effective for you. Please provide this summary of care documentation to your next provider. Signatures-by signing, you are acknowledging that this After Visit Summary has been reviewed with you and you have received a copy. Patient Signature:  ____________________________________________________________ Date:  ____________________________________________________________  
  
Melony Stanford Provider Signature:  ____________________________________________________________ Date:  ____________________________________________________________

## 2018-04-18 NOTE — H&P (VIEW-ONLY)
Bariatric Surgery History and Physical  Olga Manzano is a 34 y.o. female scheduled for laparoscopic sleeve gastrectomy with Dr. Desiree Lau on April 18, 2018 at [facility]. Patient comes in office today for history and physical, and to receive preoperative liver shrinking diet. Patient height is 5'8'', weight is 274 pounds, BMI is 41.66, and frame is large at 9cm. Neck circumference is 18.5 inches, waist is 52.5 inches, and hip circumference is 48.5 inches. Bariatric comorbidities present: GERD  Patient Active Problem List    Diagnosis Date Noted    Morbid obesity due to excess calories ([facility] [address] 11/17/2016    GERD (gastroesophageal reflux disease)     Asthma     Trouble in sleeping      Past Medical History:   Diagnosis Date    Asthma     GERD (gastroesophageal reflux disease)     Hx gestational diabetes     Morbid obesity due to excess calories ([facility] [address] 11/17/2016    Trouble in sleeping       Past Surgical History:   Procedure Laterality Date    COLONOSCOPY N/A 4/24/2017    [facility]  4/24/2017         UPPER GI ENDOSCOPY,BIOPSY  4/24/2017           Social History   Substance Use Topics    Smoking status: Former Smoker     Packs/day: 0.25     Years: 15.00     Quit date: 4/21/2012    Smokeless tobacco: Never Used    Alcohol use Yes      Comment: occasionally      Family History   Problem Relation Age of Onset    Cancer Mother     Diabetes Mother     Hypertension Mother       Prior to Admission medications    Medication Sig Start Date End Date Taking? Authorizing Provider   MULTIVITAMIN PO Take  by mouth. Yes Historical Provider   hyoscyamine SL (LEVSIN/SL) 0.125 mg SL tablet 1 Tab by SubLINGual route every four (4) hours as needed for Cramping. 3/30/18  Yes Jani Ayers NP   ondansetron (ZOFRAN ODT) 4 mg disintegrating tablet Take 1 Tab by mouth every eight (8) hours as needed for Nausea.  3/30/18  Yes Rosie Dmitriy Osorio NP   albuterol (PROAIR HFA) 90 mcg/actuation inhaler Take  by inhalation. Yes Historical Provider   NEBULIZER by Does Not Apply route as needed. Yes Historical Provider   ASCORBATE CALCIUM (VITAMIN C PO) Take  by mouth. Yes Historical Provider   CYANOCOBALAMIN, VITAMIN B-12, (VITAMIN B-12 PO) Take  by mouth. Yes Historical Provider   omeprazole (PRILOSEC) 20 mg capsule Take 20 mg by mouth daily. Yes Historical Provider   Cholecalciferol, Vitamin D3, 50,000 unit cap Take  by mouth every seven (7) days. Yes Historical Provider     No Known Allergies   Ade Guo MD is primary care provider. HPI    Review of Systems   Constitutional: Negative for chills, fever and malaise/fatigue. HENT: Negative for congestion, hearing loss and tinnitus. Eyes: Positive for blurred vision. Negative for double vision and photophobia. Eyeglasses for reading. Respiratory: Negative for cough, sputum production and shortness of breath. Cardiovascular: Negative for chest pain, palpitations and leg swelling. Gastrointestinal: Negative for abdominal pain, blood in stool, constipation, diarrhea, heartburn, nausea and vomiting. Genitourinary: Negative for dysuria, frequency and urgency. No kidney stone history   Musculoskeletal: Positive for joint pain. Negative for back pain, myalgias and neck pain. Neurological: Negative for dizziness, seizures and headaches. Endo/Heme/Allergies:        No diabetes, no thyroid disease, no anemia at this time, and no gout history   Psychiatric/Behavioral: Negative for depression, hallucinations, memory loss, substance abuse and suicidal ideas. The patient is not nervous/anxious and does not have insomnia. Physical Exam   Constitutional: She appears well-developed and well-nourished. Non-toxic appearance. No distress. HENT:   Head: Normocephalic and atraumatic. Head is without abrasion and without contusion.  Hair is normal.   Right Ear: Hearing and external ear normal.   Left Ear: Hearing and external ear normal.   Nose: Nose normal. No nasal deformity, septal deviation or nasal septal hematoma. Right sinus exhibits no maxillary sinus tenderness and no frontal sinus tenderness. Left sinus exhibits no maxillary sinus tenderness and no frontal sinus tenderness. Mouth/Throat: Uvula is midline, oropharynx is clear and moist and mucous membranes are normal. Mucous membranes are not pale, not dry and not cyanotic. Eyes: Conjunctivae, EOM and lids are normal. Pupils are equal, round, and reactive to light. No scleral icterus. Neck: Trachea normal and normal range of motion. No JVD present. Carotid bruit is not present. No tracheal deviation present. Cardiovascular: Normal rate, regular rhythm, S1 normal, S2 normal, normal heart sounds, intact distal pulses and normal pulses. Exam reveals no gallop. No murmur heard. Pulmonary/Chest: Effort normal and breath sounds normal. She has no decreased breath sounds. She has no wheezes. She has no rhonchi. She has no rales. She exhibits no laceration, no crepitus and no retraction. Abdominal: Soft. Normal appearance. There is no hepatosplenomegaly. There is no tenderness. No hernia. Abdomen obese, non-tender, non- distended. No hernia/masses palpated. Musculoskeletal: Normal range of motion. Lymphadenopathy:        Head (right side): No submental, no submandibular, no tonsillar, no preauricular and no posterior auricular adenopathy present. Head (left side): No submental, no submandibular, no tonsillar, no preauricular and no posterior auricular adenopathy present. She has no cervical adenopathy. She has no axillary adenopathy. Neurological: She is alert. She has normal strength. No cranial nerve deficit or sensory deficit. Skin: Skin is warm and dry. No rash noted. No cyanosis. Nails show no clubbing. Psychiatric: She has a normal mood and affect.  Her speech is normal and behavior is normal. Judgment and thought content normal. Cognition and memory are normal.   Blood pressure 141/78, pulse 99, temperature 98.1 °F (36.7 °C), temperature source Oral, height 5' 8\" (1.727 m), weight 274 lb (124.3 kg), SpO2 96 %. ASSESSMENT and PLAN    Morbid obesity with body mass index of 41.66. Co-morbids listed above    Patient is scheduled for laparoscopic sleeve gastrectomy with Dr. Sophy Wheatley on April 18, 2018 at [facility].Counseled patient in regard to post diet restrictions, follow- up office visits, follow- up care, and follow up medications. Prescriptions for Zofran and Levsin given. Patient as received educational booklet and vitamin list. Patient to start preoperative liver shrinking diet on April 4, 2018. Answered all questions and patient wishes to proceed.      Signed By: Muriel Fothergill, NP     March 30, 2018       28 minutes was spent with patient

## 2018-04-18 NOTE — PERIOP NOTES
Patient: Ingris Mondragon MRN: [id number]  SSN: [id number]   YOB: 1980  Age: 34 y.o. Sex: female     Patient is status post Procedure(s):  ROBOTIC ASSISTED SLEEVE GASTRECTOMY WITH EGD  ESOPHAGOGASTRODUODENOSCOPY (EGD).     Surgeon(s) and Role:     * Maria Eugenia Valle MD - Primary    Local/Dose/Irrigation:  0.5% bupivacaine w epi                Peripheral IV 04/18/18 Right Hand (Active)   Dressing Status New 4/18/2018  8:13 AM   Dressing Type Transparent 4/18/2018  8:13 AM   Hub Color/Line Status Infusing 4/18/2018  8:13 AM       Peripheral IV 04/18/18 Left Hand (Active)   Dressing Status New 4/18/2018  8:14 AM   Dressing Type Transparent 4/18/2018  8:14 AM   Hub Color/Line Status Infusing 4/18/2018  8:14 AM                           Dressing/Packing:  Wound Abdomen Anterior-DRESSING TYPE: Topical skin adhesive/glue (04/18/18 1100)  Splint/Cast:  ]    Other:

## 2018-04-19 PROCEDURE — 74011250636 HC RX REV CODE- 250/636: Performed by: SURGERY

## 2018-04-19 PROCEDURE — 74011250636 HC RX REV CODE- 250/636: Performed by: NURSE PRACTITIONER

## 2018-04-19 PROCEDURE — 74011000250 HC RX REV CODE- 250: Performed by: SURGERY

## 2018-04-19 PROCEDURE — 74011250637 HC RX REV CODE- 250/637: Performed by: SURGERY

## 2018-04-19 PROCEDURE — 65660000000 HC RM CCU STEPDOWN

## 2018-04-19 RX ORDER — SODIUM CHLORIDE, SODIUM LACTATE, POTASSIUM CHLORIDE, CALCIUM CHLORIDE 600; 310; 30; 20 MG/100ML; MG/100ML; MG/100ML; MG/100ML
125 INJECTION, SOLUTION INTRAVENOUS CONTINUOUS
Status: DISPENSED | OUTPATIENT
Start: 2018-04-19 | End: 2018-04-20

## 2018-04-19 RX ORDER — ACETAMINOPHEN 500 MG
1000 TABLET ORAL
Status: DISCONTINUED | OUTPATIENT
Start: 2018-04-19 | End: 2018-04-21 | Stop reason: HOSPADM

## 2018-04-19 RX ADMIN — KETOROLAC TROMETHAMINE 15 MG: 30 INJECTION, SOLUTION INTRAMUSCULAR at 05:47

## 2018-04-19 RX ADMIN — KETOROLAC TROMETHAMINE 15 MG: 30 INJECTION, SOLUTION INTRAMUSCULAR at 12:28

## 2018-04-19 RX ADMIN — HYDROMORPHONE HYDROCHLORIDE 1 MG: 2 INJECTION INTRAMUSCULAR; INTRAVENOUS; SUBCUTANEOUS at 21:13

## 2018-04-19 RX ADMIN — ENOXAPARIN SODIUM 40 MG: 40 INJECTION SUBCUTANEOUS at 08:15

## 2018-04-19 RX ADMIN — SODIUM CHLORIDE 10 MG: 9 INJECTION INTRAMUSCULAR; INTRAVENOUS; SUBCUTANEOUS at 06:20

## 2018-04-19 RX ADMIN — ONDANSETRON 4 MG: 2 INJECTION INTRAMUSCULAR; INTRAVENOUS at 05:47

## 2018-04-19 RX ADMIN — GABAPENTIN 300 MG: 300 CAPSULE ORAL at 21:08

## 2018-04-19 RX ADMIN — SODIUM CHLORIDE, SODIUM LACTATE, POTASSIUM CHLORIDE, AND CALCIUM CHLORIDE 125 ML/HR: 600; 310; 30; 20 INJECTION, SOLUTION INTRAVENOUS at 17:44

## 2018-04-19 RX ADMIN — HYDROMORPHONE HYDROCHLORIDE 1 MG: 2 INJECTION INTRAMUSCULAR; INTRAVENOUS; SUBCUTANEOUS at 06:07

## 2018-04-19 RX ADMIN — SODIUM CHLORIDE, SODIUM LACTATE, POTASSIUM CHLORIDE, AND CALCIUM CHLORIDE 125 ML/HR: 600; 310; 30; 20 INJECTION, SOLUTION INTRAVENOUS at 08:15

## 2018-04-19 RX ADMIN — Medication 10 ML: at 21:08

## 2018-04-19 RX ADMIN — SODIUM CHLORIDE 10 MG: 9 INJECTION INTRAMUSCULAR; INTRAVENOUS; SUBCUTANEOUS at 00:26

## 2018-04-19 NOTE — PROGRESS NOTES
Vitals:        Temp: 99.4 °F (37.4 °C)  Temp Source: Oral  Pulse (Heart Rate): (!) 110  Heart Rate Source: Monitor  Level of Consciousness: Alert  BP: 128/58  MAP (Calculated): 81  BP 1 Location: Left arm  BP 1 Method: Automatic  BP Patient Position: At rest  Resp Rate: 20  O2 Sat (%): 98 %  O2 Device: Room air  O2 Flow Rate (L/min): 2 l/min  MEWS Score: 2      Patient A&Ox4 with family at bedside. She has been up walking x1 in the hallway. Patient has had about 20 mL serosanguinous emesis per shift report. Patient has been dry heaving all night and spitting up a minute amount of serosanguinous emesis around 21:00 (roughly 3 mL). She has had minimal c/o pain but \"extreme\" nausea which required the help of ice packs, ice chips, aromatherapy, Zofran, Compazine (per overnight on call order), Levsin, and Ativan. She finally settled down around midnight and has been resting ever sense. She is still easy to wake and is calm and coorperative upon waking. Patient has not attempted any PO fluid intake at this time due to nausea. Will continue to monitor her thoroughly         05:50 - Patient starting on sips and chips. Tolerating a SF ice pop. Toradol and Zofran (prophylaxis) given. 06:00 - Patient vomiting up water and ice pops.  Compazine given   06:30 - Patient resting quietly in bed with family present

## 2018-04-19 NOTE — PROGRESS NOTES
Gen Surg @ [facility]  Attending addendum:  I supervised the NP Ck Newby) and reviewed the progress note. We discussed the plan of care. I visited and examined the pt independently. She was sleepy but awakened easily and alert. \"The nausea better but I'm so sleepy. \"  Has ambulated. Not much PO intake yet    Patient Vitals for the past 12 hrs:   Temp Pulse Resp BP SpO2   04/19/18 0815 98.4 °F (36.9 °C) 87 16 112/53 94 %   04/19/18 0340 99.4 °F (37.4 °C) (!) 110 20 128/58 98 %   04/18/18 2323 99 °F (37.2 °C) 95 17 (!) 153/98 96 %        Physical Exam:   Gen NAD  Abd Obese, soft, nontender. Hospital Problems as of 4/19/2018  Date Reviewed: 4/18/2018          Codes Class Noted - Resolved POA    Sleep apnea ICD-10-CM: G47.30  ICD-9-CM: 780.57  4/18/2018 - Present Yes    Overview Signed 4/18/2018  8:48 AM by Pasquale Andrade MD     NO LONGER USES CPAP             S/P laparoscopic sleeve gastrectomy ICD-10-CM: J19.21  ICD-9-CM: V45.86  4/18/2018 - Present Yes    Overview Signed 4/18/2018 10:49 AM by Pasquale Andrade MD     robotic assisted, Maria Luisa Gonzalez             * (Principal)Morbid obesity due to excess calories ([facility] [address] ICD-10-CM: E66.01  ICD-9-CM: 278.01  11/17/2016 - Present Yes    Overview Signed 11/17/2016 12:32 PM by Pelon Martinez NP     Overweight for past 15 years. Highest adult weight is 273 pounds. Lowest adult weight was 130 pounds. GERD (gastroesophageal reflux disease) ICD-10-CM: K21.9  ICD-9-CM: 530.81  Unknown - Present Yes        Asthma ICD-10-CM: J45.909  ICD-9-CM: 493.90  Unknown - Present Yes              I agree with the APC's assessment and plan with the following additions/modifications:  Watch PO intake closely. Hopefully she can catch up with pathway. If not improved by this afternoon, consider UGI.     Signed By: Pasquale Andrade MD     April 19, 2018

## 2018-04-19 NOTE — PROGRESS NOTES
Reason for Admission:   ROBOTIC ASSISTED SLEEVE GASTRECTOMY WITH EGD, ESOPHAGOGASTRODUODENOSCOPY (EGD). RRAT Score:   7                  Plan for utilizing home health:   Not indicated at this time                       Likelihood of Readmission:  Low                         Transition of Care Plan: Patient will be discharged home in care of her son, daughter and boyfriend. CM met with patient and boyfriend to discuss discharge planning. Patient lives with her 16year old son in her private residence. She is independent without any assistive devices. She sees her PCP as needed and uses her local [facility] for prescriptions. Her 25year old daughter will provide transportation home. CM will follow as needed. Yimi Romero MSA, RN, CRM. Care Management Interventions  PCP Verified by CM: Yes  Palliative Care Criteria Met (RRAT>21 & CHF Dx)?: No  Mode of Transport at Discharge: Other (see comment) (Private car)  Transition of Care Consult (CM Consult): Discharge Planning  MyChart Signup: No  Discharge Durable Medical Equipment: No  Health Maintenance Reviewed: Yes  Physical Therapy Consult: No  Occupational Therapy Consult: No  Speech Therapy Consult: No  Current Support Network:  Other (Lives with her 16year old son.)  Confirm Follow Up Transport: Family  Plan discussed with Pt/Family/Caregiver: Yes  Discharge Location  Discharge Placement: Home with family assistance

## 2018-04-19 NOTE — PROGRESS NOTES
Patient just starting to sip clear liquids. Stated still feels drowsy from medications, so has been slow to drink. No nausea, no vomiting, no dry heaves no abdominal pain. Ambulating, voiding    Blood pressure 113/58, pulse 85, temperature 98.2 °F (36.8 °C), resp. rate 16, height 5' 8\" (1.727 m), weight 261 lb 7.5 oz (118.6 kg), last menstrual period 03/26/2018, SpO2 97 %.     Encourage patient to continue with clear liquids, also try warm liquids with broth  Will hold off on Upper GI for now

## 2018-04-19 NOTE — ROUTINE PROCESS
Bedside shift change report given to Shaggy (oncoming nurse) by Evon Garrido (offgoing nurse). Report included the following information SBAR, ED Summary, Intake/Output, Med Rec Status and Cardiac Rhythm Sinus tach.

## 2018-04-19 NOTE — PROGRESS NOTES
Progress Note    Patient: Ambreen Platt MRN: [id number]  SSN: [id number]    YOB: 1980  Age: 34 y.o. Sex: female      Admit Date: 2018    1 Day Post-Op    Procedure:  Procedure(s):  ROBOTIC ASSISTED SLEEVE GASTRECTOMY WITH EGD  ESOPHAGOGASTRODUODENOSCOPY (EGD)    Subjective:     Patient with dry heaves,nausea, and hematemesis on yesterday evening. Received multiple antiemetics and Ativan. Patient drowsy, yet arousable this morning. No nausea, no vomiting at this time. No chest pain, no shortness of breath, and no abdominal pain. Ambulating and voiding independently. Objective:     Visit Vitals    /53 (BP 1 Location: Left arm, BP Patient Position: At rest)    Pulse 87    Temp 98.4 °F (36.9 °C)    Resp 16    Ht 5' 8\" (1.727 m)    Wt 261 lb 7.5 oz (118.6 kg)    SpO2 94%    BMI 39.76 kg/m2       Temp (24hrs), Av.5 °F (36.9 °C), Min:96.8 °F (36 °C), Max:99.4 °F (37.4 °C)      Physical Exam:    LUNG: clear to auscultation bilaterally, HEART: regular rate and rhythm, S1, S2 normal,ABDOMEN: soft, non-distended, mild tenderness with deep palpation. Bowel sounds present.  Lap sites dry and intact, EXTREMITIES:  extremities normal, atraumatic, no cyanosis or edema        Lab Review:   BMP: No results found for: NA, K, CL, CO2, AGAP, GLU, BUN, CREA, GFRAA, GFRNA  CMP: No results found for: NA, K, CL, CO2, AGAP, GLU, BUN, CREA, GFRAA, GFRNA, CA, MG, PHOS, ALB, TBIL, TP, ALB, GLOB, AGRAT, SGOT, ALT, GPT  CBC: No results found for: WBC, HGB, HGBEXT, HCT, HCTEXT, PLT, PLTEXT, HGBEXT, HCTEXT, PLTEXT    Assessment:     Hospital Problems  Date Reviewed: 2018          Codes Class Noted POA    Sleep apnea ICD-10-CM: G47.30  ICD-9-CM: 780.57  2018 Yes    Overview Signed 2018  8:48 AM by Lynne Wild MD     NO LONGER USES CPAP             S/P laparoscopic sleeve gastrectomy ICD-10-CM: S17.67  ICD-9-CM: V45.86  2018 Yes    Overview Signed 2018 10:49 AM by Rose Lee Mami Torres MD     robotic assisted, Mami Torres             * (Principal)Morbid obesity due to excess calories [facility]) ICD-10-CM: E66.01  ICD-9-CM: 278.01  11/17/2016 Yes    Overview Signed 11/17/2016 12:32 PM by Muriel Fothergill, NP     Overweight for past 15 years. Highest adult weight is 273 pounds. Lowest adult weight was 130 pounds. GERD (gastroesophageal reflux disease) ICD-10-CM: K21.9  ICD-9-CM: 530.81  Unknown Yes        Asthma ICD-10-CM: J45.909  ICD-9-CM: 493.90  Unknown Yes              Plan/Recommendations/Medical Decision Making:     Continue present treatment  Continue anti-emetics as needed. Continue liquid diet, restart with clears then advance as tolerated. PPI and DVT prophylaxis  Nutrition consultation.    Continue to encourage PO intake and ambulation  Further plans per Dr. Juana Perez By: Muriel Fothergill, NP     April 19, 2018

## 2018-04-19 NOTE — PROGRESS NOTES
Problem: Patient Education: Go to Patient Education Activity  Goal: Patient/Family Education  Outcome: Progressing Towards Goal  Patient is A&Ox4 and has been up walking the halls x2. She is using her IS and getting well over 1,000. She has complained of minimal pain but has been nauseous on and off. She has had one very small serosanguinous emesis (roughly 10mL) per the day shift nurse. Patient is sucking on ice chips, given zofran, using aromatherapy, and is in bed currently. Will continue to monitor closely for any s/s of complications and report accordingly.

## 2018-04-19 NOTE — PROGRESS NOTES
Problem: Patient Education: Go to Patient Education Activity  Goal: Patient/Family Education  Outcome: Resolved/Met Date Met: 04/19/18  NUTRITION     Chart reviewed. Post-op bariatric diet instruction completed. Pt needs to purchase smaller calcium citrate- will purchase in pharmacy upon discharge. Will gladly follow up for additional questions as needed. Thank you.      Nhi Danielle RD

## 2018-04-20 LAB
ERYTHROCYTE [DISTWIDTH] IN BLOOD BY AUTOMATED COUNT: 12.2 % (ref 11.5–14.5)
HCT VFR BLD AUTO: 30.3 % (ref 35–47)
HGB BLD-MCNC: 10 G/DL (ref 11.5–16)
MCH RBC QN AUTO: 31.8 PG (ref 26–34)
MCHC RBC AUTO-ENTMCNC: 33 G/DL (ref 30–36.5)
MCV RBC AUTO: 96.5 FL (ref 80–99)
NRBC # BLD: 0 K/UL (ref 0–0.01)
NRBC BLD-RTO: 0 PER 100 WBC
PLATELET # BLD AUTO: 217 K/UL (ref 150–400)
PMV BLD AUTO: 10.3 FL (ref 8.9–12.9)
RBC # BLD AUTO: 3.14 M/UL (ref 3.8–5.2)
WBC # BLD AUTO: 8.3 K/UL (ref 3.6–11)

## 2018-04-20 PROCEDURE — 65660000000 HC RM CCU STEPDOWN

## 2018-04-20 PROCEDURE — 74011250636 HC RX REV CODE- 250/636: Performed by: SURGERY

## 2018-04-20 PROCEDURE — 36415 COLL VENOUS BLD VENIPUNCTURE: CPT | Performed by: SURGERY

## 2018-04-20 PROCEDURE — 74011250636 HC RX REV CODE- 250/636: Performed by: NURSE PRACTITIONER

## 2018-04-20 PROCEDURE — 74011250637 HC RX REV CODE- 250/637: Performed by: SURGERY

## 2018-04-20 PROCEDURE — 85027 COMPLETE CBC AUTOMATED: CPT | Performed by: SURGERY

## 2018-04-20 RX ORDER — DEXAMETHASONE SODIUM PHOSPHATE 4 MG/ML
10 INJECTION, SOLUTION INTRA-ARTICULAR; INTRALESIONAL; INTRAMUSCULAR; INTRAVENOUS; SOFT TISSUE ONCE
Status: COMPLETED | OUTPATIENT
Start: 2018-04-20 | End: 2018-04-20

## 2018-04-20 RX ORDER — HYDROMORPHONE HYDROCHLORIDE 2 MG/1
2 TABLET ORAL
Status: DISCONTINUED | OUTPATIENT
Start: 2018-04-20 | End: 2018-04-21 | Stop reason: HOSPADM

## 2018-04-20 RX ORDER — OXYCODONE HYDROCHLORIDE 5 MG/1
5 TABLET ORAL
Qty: 20 TAB | Refills: 0 | Status: SHIPPED | OUTPATIENT
Start: 2018-04-20 | End: 2018-05-03

## 2018-04-20 RX ORDER — HYDROMORPHONE HYDROCHLORIDE 4 MG/1
4 TABLET ORAL
Status: DISCONTINUED | OUTPATIENT
Start: 2018-04-20 | End: 2018-04-21 | Stop reason: HOSPADM

## 2018-04-20 RX ORDER — SODIUM CHLORIDE, SODIUM LACTATE, POTASSIUM CHLORIDE, CALCIUM CHLORIDE 600; 310; 30; 20 MG/100ML; MG/100ML; MG/100ML; MG/100ML
125 INJECTION, SOLUTION INTRAVENOUS CONTINUOUS
Status: DISCONTINUED | OUTPATIENT
Start: 2018-04-20 | End: 2018-04-21 | Stop reason: HOSPADM

## 2018-04-20 RX ADMIN — SODIUM CHLORIDE, SODIUM LACTATE, POTASSIUM CHLORIDE, AND CALCIUM CHLORIDE 125 ML/HR: 600; 310; 30; 20 INJECTION, SOLUTION INTRAVENOUS at 11:45

## 2018-04-20 RX ADMIN — Medication 10 ML: at 06:27

## 2018-04-20 RX ADMIN — HYDROMORPHONE HYDROCHLORIDE 2 MG: 2 TABLET ORAL at 11:36

## 2018-04-20 RX ADMIN — DEXAMETHASONE SODIUM PHOSPHATE 10 MG: 4 INJECTION, SOLUTION INTRAMUSCULAR; INTRAVENOUS at 17:22

## 2018-04-20 RX ADMIN — SODIUM CHLORIDE, SODIUM LACTATE, POTASSIUM CHLORIDE, AND CALCIUM CHLORIDE 125 ML/HR: 600; 310; 30; 20 INJECTION, SOLUTION INTRAVENOUS at 20:58

## 2018-04-20 RX ADMIN — HYOSCYAMINE SULFATE 0.12 MG: 0.12 TABLET ORAL; SUBLINGUAL at 11:42

## 2018-04-20 RX ADMIN — Medication 10 ML: at 22:00

## 2018-04-20 RX ADMIN — SODIUM CHLORIDE, SODIUM LACTATE, POTASSIUM CHLORIDE, AND CALCIUM CHLORIDE 125 ML/HR: 600; 310; 30; 20 INJECTION, SOLUTION INTRAVENOUS at 02:59

## 2018-04-20 RX ADMIN — ENOXAPARIN SODIUM 40 MG: 40 INJECTION SUBCUTANEOUS at 09:37

## 2018-04-20 RX ADMIN — GABAPENTIN 300 MG: 300 CAPSULE ORAL at 09:37

## 2018-04-20 RX ADMIN — Medication 10 ML: at 17:23

## 2018-04-20 RX ADMIN — PANTOPRAZOLE SODIUM 40 MG: 40 TABLET, DELAYED RELEASE ORAL at 09:37

## 2018-04-20 RX ADMIN — HYOSCYAMINE SULFATE 0.12 MG: 0.12 TABLET ORAL; SUBLINGUAL at 06:24

## 2018-04-20 NOTE — PROGRESS NOTES
Problem: Falls - Risk of  Goal: *Absence of Falls  Document Gianna Fall Risk and appropriate interventions in the flowsheet.    Outcome: Progressing Towards Goal  Fall Risk Interventions:            Medication Interventions: Assess postural VS orthostatic hypotension

## 2018-04-20 NOTE — ROUTINE PROCESS
Bedside shift change report given to Bossman Oneill RN (oncoming nurse) by Lorne Morgan RN (offgoing nurse). Report included the following information SBAR, OR Summary, Procedure Summary, Intake/Output, MAR, Accordion and Recent Results.

## 2018-04-20 NOTE — PROGRESS NOTES
General Surgery at [facility]    The patient reports no longer has nausea. No pain and no difficulty ambulating. The issue that is preventing her from discharge is inadequate PO intake. She says she feels very full with one ounce of fluid. The most she has managed to take within one hour is two oz. Patient Vitals for the past 12 hrs:   Temp Pulse Resp BP SpO2   18 1230 99.2 °F (37.3 °C) (!) 107 17 145/82 98 %   18 0814 98.4 °F (36.9 °C) 91 17 147/76 97 %     Temp (24hrs), Av.7 °F (37.1 °C), Min:98.4 °F (36.9 °C), Max:99.2 °F (37.3 °C)        PE: deferred    Assessment:   Hospital Problems as of 2018  Date Reviewed: 2018          Codes Class Noted - Resolved POA    Sleep apnea ICD-10-CM: G47.30  ICD-9-CM: 780.57  2018 - Present Yes    Overview Signed 2018  8:48 AM by Lynne Wild MD     NO LONGER USES CPAP             S/P laparoscopic sleeve gastrectomy ICD-10-CM: C47.31  ICD-9-CM: V45.86  2018 - Present Yes    Overview Signed 2018 10:49 AM by Lynne Wild MD     robotic assisted, Arlin Haines             * (Principal)[facility] ICD-10-CM: E66.01  ICD-9-CM: 278.01  2016 - Present Yes    Overview Signed 2016 12:32 PM by Deepti Andrews NP     Overweight for past 15 years. Highest adult weight is 273 pounds. Lowest adult weight was 130 pounds. GERD (gastroesophageal reflux disease) ICD-10-CM: K21.9  ICD-9-CM: 530.81  Unknown - Present Yes        Asthma ICD-10-CM: J45.909  ICD-9-CM: 493.90  Unknown - Present Yes              2 Days Post-Op  Procedure(s):  ROBOTIC ASSISTED SLEEVE GASTRECTOMY WITH EGD  ESOPHAGOGASTRODUODENOSCOPY (EGD)     Suspect early fullness due to edema in proximal sleeve.      Rec/Plan:  dexamethasone    Signed By: Lynne Wild MD     2018

## 2018-04-20 NOTE — DISCHARGE INSTRUCTIONS
DISCHARGE SUMMARY from Nurse    PATIENT INSTRUCTIONS:      These are general instructions for a healthy lifestyle:    No smoking/ No tobacco products/ Avoid exposure to second hand smoke  Surgeon General's Warning:  Quitting smoking now greatly reduces serious risk to your health. Obesity, smoking, and sedentary lifestyle greatly increases your risk for illness    A healthy diet, regular physical exercise & weight monitoring are important for maintaining a healthy lifestyle    You may be retaining fluid if you have a history of heart failure or if you experience any of the following symptoms:  Weight gain of 3 pounds or more overnight or 5 pounds in a week, increased swelling in our hands or feet or shortness of breath while lying flat in bed. Please call your doctor as soon as you notice any of these symptoms; do not wait until your next office visit. Recognize signs and symptoms of STROKE:    F-face looks uneven    A-arms unable to move or move unevenly    S-speech slurred or non-existent    T-time-call 911 as soon as signs and symptoms begin-DO NOT go       Back to bed or wait to see if you get better-TIME IS BRAIN. Warning Signs of HEART ATTACK     Call 911 if you have these symptoms:  Chest discomfort. Most heart attacks involve discomfort in the center of the chest that lasts more than a few minutes, or that goes away and comes back. It can feel like uncomfortable pressure, squeezing, fullness, or pain. Discomfort in other areas of the upper body. Symptoms can include pain or discomfort in one or both arms, the back, neck, jaw, or stomach. Shortness of breath with or without chest discomfort. Other signs may include breaking out in a cold sweat, nausea, or lightheadedness. Don't wait more than five minutes to call 911 - MINUTES MATTER! Fast action can save your life. Calling 911 is almost always the fastest way to get lifesaving treatment.  Emergency Medical Services staff can begin treatment when they arrive -- up to an hour sooner than if someone gets to the hospital by car. The discharge information has been reviewed with the patient. The patient verbalized understanding. Discharge medications reviewed with the patient and appropriate educational materials and side effects teaching were provided. ___________________________________________________________________________________________________________________________________[facility]  Procedure:  Laparoscopic Sleeve Gastrectomy    You have  a 2 week follow up appointment    Future Appointments  Date Time Provider Jose Cervantes   5/3/2018 1:00 PM ARLET Pendleton   5/23/2018 4:00 PM ARLET Talbot   6/6/2018 4:00 PM ARLET Talbot          It is common to experience reflux or heartburn after sleeve gastrectomy. These symptoms are generally self-limited and resolve over several weeks to couple of months. your doctor may have prescribed medication to prevent these symptoms. If you experience reflux and feel it is limiting your fluid intake call your doctor immediately so that it can be effectively treated. Please remember that you are on ONLY LIQUIDS for the first 2 weeks after surgery until you are seen in our office. You should be drinking plenty of no calorie, non carbonated liquids through out the day and your meals are going to be a shake, the same type you did for your 2 week pre-op diet. Do not advance your diet until you are seen in our office. [facility] [address]  [phone]      Laparoscopic Sleeve Gastrectomy: What to Expect at [address] [place] [address]  A sleeve gastrectomy is a  surgery to make the stomach smaller. It is done to help people lose weight. The surgery limits the amount of food the stomach can hold.  This helps you eat less and feel full sooner. The cuts (incisions) the doctor made in your belly will probably be sore for several days after the surgery. You probably will lose weight very quickly in the first few months after surgery. As time goes on, your weight loss will slow down. You can expect most of your weight loss to happen in the first 12 months after your surgery. You will have regular doctor's appointments during this time to check how you are doing. It is important to think of this surgery as a tool to help you lose weight. It is not an instant fix. You will still need to eat a healthy diet and get regular exercise. This will help you reach your weight goal and avoid regaining the weight you lose. It is common to have many different emotions after this surgery. You may feel happy or excited as you begin to lose weight. But you may also feel overwhelmed or frustrated by the changes that you have to make in your diet, activity, and lifestyle. Talk with your doctor if you have concerns or questions. This care sheet gives you a general idea about how long it will take for you to recover. But each person recovers at a different pace. Follow the steps below to get better as quickly as possible. How can you care for yourself at home? Activity  · Rest when you feel tired. Getting enough sleep will help you recover. · Try to walk each day. Start by walking a little more than you did the day before. Bit by bit, increase the amount you walk. Walking boosts blood flow and helps prevent pneumonia and constipation. · Avoid strenuous activities, such as bicycle riding, jogging, weight lifting, or aerobic exercise, until your doctor says it is okay. · Until your doctor says it is okay, avoid lifting anything that would make you strain. This may include heavy grocery bags and milk containers, a heavy briefcase or backpack, cat litter or dog food bags, a vacuum , or a child.   · Hold a pillow over your incisions when you cough or take deep breaths. This will support your belly and decrease your pain. · Do breathing exercises at home as instructed by your doctor. This will help prevent pneumonia. · You can drive when you are no longer taking narcotic pain medications and feel comfortable sitting in a car. · You will probably need to take 2 to 4 weeks off from work. It depends on the type of work you do and how you feel. You will probably return to normal activities within 3 to 5 weeks. · You may shower, if your doctor okays it. Pat the incisions dry. Do not take a bath for the first 2 weeks, or until your doctor tells you it is okay. Diet  · Your doctor will give you specific instructions about what to eat after the surgery. For the first 2 weeks, you will need to follow a liquid diet only. DO NOT ADVANCE TO SOFT FOOD UNTIL CLEARED BY YOUR DOCTOR. · Your doctor may recommend that you work with a dietitian to plan healthy meals that give you enough protein, vitamins, and minerals while you are losing weight. Even with a healthy diet, you probably will need to take vitamin and mineral supplements for the rest of your life. · At first you may feel full after just a few sips of water or other liquid. It is important to try to sip water throughout the day to avoid becoming dehydrated. · You may notice that your bowel movements are not regular right after your surgery. This is common. Try to avoid constipation and straining with bowel movements. · Sometimes the stomach empties food into the small intestine too quickly. This is called dumping syndrome. It can cause diarrhea and make you feel faint, shaky, and nauseated. It also can make it hard for your body to get enough nutrition. ¨  Avoid high-sugar foods, or use products that have artificial sweeteners. ¨ Do not drink liquids within a half hour before eating and up to an hour after eating. ¨   ¨ Eat slowly. Try to chew each bite about 20 times.  Allow 20 to 30 minutes for each meal.   Eat 3-4 4 ounce small meals or snacks a day. Medicines  · Take pain medicines exactly as directed. ¨ If the doctor gave you a prescription medicine for pain, take it as prescribed. ¨ If you are not taking a prescription pain medicine, ask your doctor if you can take an over-the-counter medicine. ¨ Do not take two or more pain medicines at the same time unless the doctor told you to. Many pain medicines have acetaminophen, which is Tylenol. Too much acetaminophen (Tylenol) can be harmful. · If you think your pain medicine is making you sick to your stomach:  ¨ Take your medicine after meals (unless your doctor has told you not to). Incision care  · Your incisions have a waterproof clue dressing on them that will peel off in 2 weeks. · Wash the area daily with warm, soapy water, and pat it dry. Other cleaning products, such as hydrogen peroxide, can make the wound heal more slowly. You may cover the area with a gauze bandage if it weeps or rubs against clothing. Change the bandage every day. · Keep the area clean and dry. Follow-up care is a key part of your treatment and safety. Be sure to make and go to all appointments, and call your doctor if you are having problems. Itâs also a good idea to know your test results and keep a list of the medicines you take. When should you call for help? Call 911 anytime you think you may need emergency care. For example, call if:  · You pass out (lose consciousness). · You have severe trouble breathing. · You have sudden chest pain and shortness of breath, or you cough up blood. · You have severe belly pain. Call your doctor now or seek immediate medical care if:  · You are sick to your stomach or cannot keep fluids down. · You have pain that does not get better after you take pain medicine. · You have a fever over 101°F.  · You have loose stitches, or any of your incisions come open.   · Bright red blood has soaked through the bandages over your incisions. · You have signs of infection, such as:  ¨ Increased pain, swelling, warmth, or redness. ¨ Red streaks leading from the incisions. ¨ Pus draining from the incision. ¨ Swollen lymph nodes in your neck, armpits, or groin. ¨ A fever. · You have signs of needing more fluids. You have sunken eyes and a dry mouth, and you pass only a little dark urine. Watch closely for changes in your health, and be sure to contact your doctor if you have any problems. Where can you learn more? Go to [email or web address]. Enter Y354 in the search box to learn more about \"Laparoscopic Devan-en-Y Gastric Bypass: What to Expect at Home. \"   © 7684-5026 [employer]. Care instructions adapted under license by [employer] (which disclaims liability or warranty for this information). This care instruction is for use with your licensed healthcare professional. If you have questions about a medical condition or this instruction, always ask your healthcare professional. Yaa Cheek any warranty or liability for your use of this information.

## 2018-04-20 NOTE — PROGRESS NOTES
Surgery Progress Note  Subjective:     Pt is POD#2 after laparoscopic sleeve gastrectomy. Pt complains of slow to drink, some epigastric pressure  no GERD or spitting up, feels a bit tight, some improvement from yesterday, unable to quantify her intake and nurse tells me she has been a bit slow . Betha Lute No SOB. No CP. Pt is ambulating. Current diet is bariatric liquids . Pt reports  no nausea and no vomiting. Pt reports no fever or chills    Objective:     Patient Vitals for the past 8 hrs:   BP Temp Pulse Resp SpO2 Weight   04/20/18 0352 132/73 98.7 °F (37.1 °C) 93 18 97 % 261 lb 7.5 oz (118.6 kg)        04/18 1901 - 04/20 0700  In: 4892.5 [P.O.:730; I.V.:4162.5]  Out: - ip  Physical Exam:    General: alert, cooperative, no distress, appears stated age  Cardiac: normal S1 and S2  Lungs: Normal chest wall and respirations. Clear to auscultation.   Abdomen: soft, nondistended, normal bowel sounds, tenderness mild - in the upper abdomen, without guarding, without rebound  Wounds:clean, dry, no drainage  Neuro: alert, oriented x 3, no defects noted in general exam.  Extremities: no edema      CBC: Lab Results   Component Value Date/Time    WBC 8.3 04/20/2018 04:23 AM    RBC 3.14 (L) 04/20/2018 04:23 AM    HGB 10.0 (L) 04/20/2018 04:23 AM    HCT 30.3 (L) 04/20/2018 04:23 AM    PLATELET 174 12/82/4568 04:23 AM     BMP: Lab Results   Component Value Date/Time    Glucose 109 (H) 04/02/2018 09:54 AM    Sodium 139 04/02/2018 09:54 AM    Potassium 4.3 04/02/2018 09:54 AM    Chloride 106 04/02/2018 09:54 AM    CO2 27 04/02/2018 09:54 AM    BUN 13 04/02/2018 09:54 AM    Creatinine 0.84 04/02/2018 09:54 AM    Calcium 9.2 04/02/2018 09:54 AM     CMP:  Lab Results   Component Value Date/Time    Glucose 109 (H) 04/02/2018 09:54 AM    Sodium 139 04/02/2018 09:54 AM    Potassium 4.3 04/02/2018 09:54 AM    Chloride 106 04/02/2018 09:54 AM    CO2 27 04/02/2018 09:54 AM    BUN 13 04/02/2018 09:54 AM    Creatinine 0.84 04/02/2018 09:54 AM Calcium 9.2 04/02/2018 09:54 AM    Anion gap 6 04/02/2018 09:54 AM    BUN/Creatinine ratio 15 04/02/2018 09:54 AM    Alk.  phosphatase 113 04/02/2018 09:54 AM    Protein, total 7.0 04/02/2018 09:54 AM    Albumin 3.8 04/02/2018 09:54 AM    Globulin 3.2 04/02/2018 09:54 AM    A-G Ratio 1.2 04/02/2018 09:54 AM       Radiology review: na        Assessment:   Pt is POD #2after Laparoscopic Sleeve Gastrectomy  Stable    Plan:     bariatric liquids, likely ok to leave later this morning   Nutrition Consult completed   walk in [place]  Pain management  GI and DVT prophylaxis  continue oral medications   no lab studies available for review at time of visit  Pt is voiding well   D/C home  ;ater this when PO intake is at least 4oz/hourly   Further plan per  Dr. Timothy Mccormick [facility]

## 2018-04-21 VITALS
HEART RATE: 87 BPM | WEIGHT: 259.7 LBS | TEMPERATURE: 98.1 F | BODY MASS INDEX: 39.36 KG/M2 | SYSTOLIC BLOOD PRESSURE: 123 MMHG | HEIGHT: 68 IN | RESPIRATION RATE: 18 BRPM | DIASTOLIC BLOOD PRESSURE: 53 MMHG | OXYGEN SATURATION: 95 %

## 2018-04-21 PROCEDURE — 74011250636 HC RX REV CODE- 250/636: Performed by: SURGERY

## 2018-04-21 PROCEDURE — 74011250637 HC RX REV CODE- 250/637: Performed by: SURGERY

## 2018-04-21 RX ORDER — HYOSCYAMINE SULFATE 0.12 MG/1
0.12 TABLET SUBLINGUAL
Qty: 30 TAB | Refills: 1 | Status: SHIPPED | OUTPATIENT
Start: 2018-04-21

## 2018-04-21 RX ORDER — ONDANSETRON 4 MG/1
4 TABLET, ORALLY DISINTEGRATING ORAL
Qty: 30 TAB | Refills: 0 | Status: SHIPPED | OUTPATIENT
Start: 2018-04-21 | End: 2018-05-03

## 2018-04-21 RX ADMIN — SODIUM CHLORIDE, SODIUM LACTATE, POTASSIUM CHLORIDE, AND CALCIUM CHLORIDE 125 ML/HR: 600; 310; 30; 20 INJECTION, SOLUTION INTRAVENOUS at 05:17

## 2018-04-21 RX ADMIN — ENOXAPARIN SODIUM 40 MG: 40 INJECTION SUBCUTANEOUS at 09:44

## 2018-04-21 RX ADMIN — Medication 10 ML: at 06:00

## 2018-04-21 RX ADMIN — Medication 10 ML: at 05:17

## 2018-04-21 RX ADMIN — PANTOPRAZOLE SODIUM 40 MG: 40 TABLET, DELAYED RELEASE ORAL at 09:44

## 2018-04-21 NOTE — ROUTINE PROCESS
Bedside shift change report given to Jeanna Sierra RN (oncoming nurse) by Nimisha Hayden RN (offgoing nurse). Report included the following information SBAR, OR Summary, Procedure Summary, Intake/Output, MAR and Recent Results.

## 2018-04-21 NOTE — PROGRESS NOTES
Progress Note    Patient: Jesus Frederick MRN: [id number]  SSN: [id number]    YOB: 1980  Age: 34 y.o. Sex: female      Admit Date: 2018    3 Days Post-Op    Procedure:  Procedure(s):  ROBOTIC ASSISTED SLEEVE GASTRECTOMY WITH EGD  ESOPHAGOGASTRODUODENOSCOPY (EGD)    Subjective:     Patient has no new complaints. Feels a lot better today and tolerating 4 oz per hour bariatric liquids. Belching some a a little flatus. \"felt so much better after that medication yesterday\"; voiding spontaneously and ambulating   Has all her supplements at home and vitamins      Objective:     Visit Vitals    /53 (BP 1 Location: Left arm, BP Patient Position: At rest)    Pulse 87    Temp 98.1 °F (36.7 °C)    Resp 18    Ht 5' 8\" (1.727 m)    Wt 259 lb 11.2 oz (117.8 kg)    SpO2 95%    BMI 39.49 kg/m2       Temp (24hrs), Av.5 °F (36.9 °C), Min:97.9 °F (36.6 °C), Max:99.2 °F (37.3 °C)      Physical Exam:    A + O x 3, male family member sleeping in chair   She is sitting on side of the bed drinking   Chest  CTA  COR  RRR  ABD Soft, lap sites are C/D/I and no erythema or induration; tender as appropriate; ND, +BS, no masses or hernias. EXT No edema; ambulating independently      Data Review: reviewed  Nursing documentation and I & O    Lab Review: All lab results for the last 24 hours reviewed.     Assessment:     Hospital Problems  Date Reviewed: 2018          Codes Class Noted POA    Sleep apnea ICD-10-CM: G47.30  ICD-9-CM: 780.57  2018 Yes    Overview Signed 2018  8:48 AM by Ernesto Herrera MD     NO LONGER USES CPAP             S/P laparoscopic sleeve gastrectomy ICD-10-CM: A65.64  ICD-9-CM: V45.86  2018 Yes    Overview Signed 2018 10:49 AM by Ernesto Herrera MD     robotic assisted, Corry Combs             * (Principal)Morbid obesity due to excess calories [facility]) ICD-10-CM: E66.01  ICD-9-CM: 278.01  2016 Yes    Overview Signed 2016 12:32 PM by Raymon Blackman NP     Overweight for past 15 years. Highest adult weight is 273 pounds. Lowest adult weight was 130 pounds. GERD (gastroesophageal reflux disease) ICD-10-CM: K21.9  ICD-9-CM: 530.81  Unknown Yes        Asthma ICD-10-CM: J45.909  ICD-9-CM: 493.90  Unknown Yes              Plan/Recommendations/Medical Decision Making:     Doing well pod 3 Sleeve gastrectomy for treatment of morbid obesity   Discharge criteria met   Reviewed diet, restrictions, follow up and medications   Phone follow up on Monday   Office visit in 2 weeks scheduled   Shabbir Last verbalized understanding and questions were answered to the best of my knowledge and ability.          Signed By: Jerrod Carlson NP     April 21, 2018

## 2018-04-23 ENCOUNTER — TELEPHONE (OUTPATIENT)
Dept: SURGERY | Age: 38
End: 2018-04-23

## 2018-04-23 NOTE — TELEPHONE ENCOUNTER
----- Message from Saira Miller LPN sent at 9/10/8381  9:45 AM EDT -----  Regardin hour post op call  Admit Date:     Pt is POD #2 s/p laparoscopic sleeve gastrectomy and is being discharged to home today  after a routine post op course.       Thanks,   Dayami Perez PA-C

## 2018-04-24 NOTE — TELEPHONE ENCOUNTER
Bariatric Post-Operative Phone Calls: 48 hour phone call    Diet:Question of any nausea and/or vomiting. Protein intake (goal is 60 grams of protein daily)   Poor____Fair____Good_x___Great____     Comment:_30 grams_____________________________________________________________      ______________________________________________________________________    Hydration:Less than 32 ounces of water daily is fair to poor (Goal is 64 ounces per day)   Poor____ Fair____ Good_x___Great____    Comment:_48 40 64 0z_____________________________________________________________    ______________________________________________________________________      Ambulation:( walking at least 3 x week, for 15- 20 minutes)     Poor______ Fair______ Good___x___     Great______ Comment:__________________________________________________    ______________________________________________________________________      Urine Color: Question of any odor and color(should be ivon, pale, and clear) Dark______ Amber______ Pale______      Clear______ Comment:___light yellow________________________________________________                           ________________________________________________________________    Bowel movements: Question of any constipation- haven't had any bowel movements for more than 3 days. This could be related to protein intake and/or narcotic pain medication usage. Comment:                                                                                             Little stool today   Okay to take a laxative. Pain: Left sided abdominal pain is normal (should be less than 3)  Question if pain medication is helpful.  10___ 9___ 8___ 7___ 6___ 5___ 4___ 3___     2___1___0__x_Comment:_________________________________________________    ______________________________________________________________________      Incision: (No redness, pain, swelling or fever) Healing Well__x____ Healed______Redness_________ Pain_________     Swelling_________ Fever__________(greater than 101 needs evaluation)    Comment:____________________________________________________________    ______________________________________________________________________  Use of incentive spirometer: Yes__x__       No           Next Appointment:__5/3/18____________                 Support Group: Yes______No__x____    Additional Comments:____________________________________________________________    ____________________________________________________________________      If more than one parameter is not met or considered poor, nurse needs to discuss with provider recommend for patient to be seen in the office as soon as possible or refer to the provider for follow-up. Reinforce to patient to use bariatric educational booklet as guide. It is appropriate to refer patient to the nutritionist to discuss more in detail of diet and nutrition.

## 2018-04-25 NOTE — DISCHARGE SUMMARY
Discharge Summary     Patient ID:  Negro Andersen  [id number]  21 y.o.  1980    Admit Date: 4/18/2018    Discharge Date: 4/21/2018    Admission Diagnoses: MORBID OBESITY; Morbid obesity [facility])    Admission Condition: Good    Discharge Diagnoses:    Hospital Problems as of 4/21/2018  Date Reviewed: 4/18/2018          Codes Class Noted - Resolved POA    Sleep apnea ICD-10-CM: G47.30  ICD-9-CM: 780.57  4/18/2018 - Present Yes    Overview Signed 4/18/2018  8:48 AM by Yusef Myrick MD     NO LONGER USES CPAP             S/P laparoscopic sleeve gastrectomy ICD-10-CM: F90.07  ICD-9-CM: V45.86  4/18/2018 - Present Yes    Overview Signed 4/18/2018 10:49 AM by Yusef Myrick MD     robotic assisted, Chidi Hernandez             * (Principal)Morbid obesity due to excess calories ([address] ICD-10-CM: E66.01  ICD-9-CM: 278.01  11/17/2016 - Present Yes    Overview Signed 11/17/2016 12:32 PM by Johny Hernandez NP     Overweight for past 15 years. Highest adult weight is 273 pounds. Lowest adult weight was 130 pounds. GERD (gastroesophageal reflux disease) ICD-10-CM: K21.9  ICD-9-CM: 530.81  Unknown - Present Yes        Asthma ICD-10-CM: J45.909  ICD-9-CM: 493.90  Unknown - Present Yes               Discharge Condition: Good    All Procedures:    Admission Date - Date of Surgery  4/18/2018    Procedure(s):  ROBOTIC ASSISTED SLEEVE GASTRECTOMY WITH EGD  ESOPHAGOGASTRODUODENOSCOPY (EGD)    Surgeon(s):  Yusef Myrick MD  -------------------      Significant Diagnostic Studies: [facility] Course: The patient was admitted to the hospital on the day of surgery and underwent the procedures above. Please see the operative report for details. She tolerated the procedure without difficulty and was transferred to the PACU then to the [facility] postoperatively. She remained hemodynamically stable overnight. On POD #1 she was advanced to a bariatric liquid diet which she did not tolerate.  She had symptoms consistent with edema within the sleeve. She eventually tolerated diet drinking a minimum of 4 ounces per hour for 4 consecutive hours and was ready for discharge on POD #3. Hospital course was complicated by slow advancement of diet, which added 2 days to the patient's length of stay. Consults: None    Disposition: home    Patient Instructions:     Discharge Medication List as of 4/21/2018 10:07 AM      START taking these medications    Details   hyoscyamine SL (LEVSIN/SL) 0.125 mg SL tablet 1 Tab by SubLINGual route every four (4) hours as needed (upper abdominal pressure / spasm associated with drinking). , Print, Disp-30 Tab, R-1      ondansetron (ZOFRAN ODT) 4 mg disintegrating tablet Take 1 Tab by mouth every eight (8) hours as needed for Nausea. , Print, Disp-30 Tab, R-0      oxyCODONE IR (ROXICODONE) 5 mg immediate release tablet Take 1 Tab by mouth every six (6) hours as needed for Pain. Max Daily Amount: 20 mg., Print, Disp-20 Tab, R-0         CONTINUE these medications which have NOT CHANGED    Details   ergocalciferol (VITAMIN D2) 50,000 unit capsule Take 1 Cap by mouth every Monday and Friday., Normal, Disp-24 Cap, R-2      calcium citrate-vitamin D3 (CITRACAL + D) tablet Take 2 Tabs by mouth daily. , Historical Med      albuterol sulfate (PROVENTIL;VENTOLIN) 2.5 mg/0.5 mL nebu nebulizer solution 2.5 mg by Nebulization route as needed for Wheezing., Historical Med      melatonin 5 mg cap capsule Take 5 mg by mouth nightly as needed., Historical Med      MULTIVITAMIN PO Take  by mouth daily. , Historical Med      albuterol (PROAIR HFA) 90 mcg/actuation inhaler Take 2 Puffs by inhalation as needed., Historical Med      NEBULIZER by Does Not Apply route as needed., Historical Med      ASCORBATE CALCIUM (VITAMIN C PO) Take 500 mg by mouth daily. , Historical Med      CYANOCOBALAMIN, VITAMIN B-12, (VITAMIN B-12 PO) Take 3,000 mcg by mouth daily. , Historical Med      omeprazole (PRILOSEC) 20 mg capsule Take 40 mg by mouth daily. , Historical Med      Cholecalciferol, Vitamin D3, 50,000 unit cap Take  by mouth every seven (7) days. , Historical Med              Activity: ambulate 15 - 20 minutes daily, no heavy lifting, pushing, pulling for 1 month and no driving while on analgesics  Diet: bariatric full liquids until follow-up appointment  Wound Care: Keep wound clean and dry  Meds: see above    Follow-up with Dr. New Ott office in 2 weeks.     Signed:  Roderick Gomez MD  4/24/2018

## 2018-05-03 ENCOUNTER — OFFICE VISIT (OUTPATIENT)
Dept: SURGERY | Age: 38
End: 2018-05-03

## 2018-05-03 VITALS
DIASTOLIC BLOOD PRESSURE: 80 MMHG | SYSTOLIC BLOOD PRESSURE: 120 MMHG | WEIGHT: 240 LBS | OXYGEN SATURATION: 96 % | RESPIRATION RATE: 18 BRPM | BODY MASS INDEX: 36.37 KG/M2 | HEIGHT: 68 IN | TEMPERATURE: 99 F | HEART RATE: 96 BPM

## 2018-05-03 DIAGNOSIS — E66.01 SEVERE OBESITY (BMI 35.0-39.9): ICD-10-CM

## 2018-05-03 DIAGNOSIS — Z09 FOLLOW-UP EXAMINATION FOLLOWING SURGERY: Primary | ICD-10-CM

## 2018-05-03 NOTE — PROGRESS NOTES
1. Have you been to the ER, urgent care clinic since your last visit? Hospitalized since your last visit? No    2. Have you seen or consulted any other health care providers outside of the [address] [place] [address] since your last visit? Include any pap smears or colon screening.  No

## 2018-05-03 NOTE — PATIENT INSTRUCTIONS
What you need to know:  1. Advance your diet to soft foods. Follow the handout that you were given today in the office. 2.  Take the recommended vitamins daily  3. No lifting greater than 20 lbs. 4.  You can do light jogging and walking. 5  Follow up in 2 weeks. 6.  You may go into a pool. 7.  If you are not able to tolerate liquids or soft foods. Please call our office. [phone]  8. If you have vomiting and persistent epigastric pain or chest pain. You should call our office, the doctor on-call or go to the emergency room. What to do if you are constipated: You may  take Milk of Magnesia. Take 2 Tablespoons followed by 16 oz of water then 2 hours later take another 2 tablespoons. If  milk of magnesia does not work then take Tillar-Merino or Miralax over the counter. Keep in mind that the Benefiber or Miralax may take a day or two to work. If all of the above do not work try a Fleets enema and follow the directions on the box. Soft and Mushy: Phase 1    Below is a list of basic items to purchase for the first phase of the   soft mushy diet. Your surgeon or nurse practitioner will inform you when it is okay   to advance to the next phase. Soft and Mushy Foods: Prepare food to the appropriate texture. ? Everything on clear and full liquid diet  ? Applesauce (no sugar added)  ? Hot & cold cereals (Cream of Wheat, Plain Cheerios®, Special K with protein®, plain oatmeal, grits)  ? Frozen or canned vegetables (carrots, acorn squash, butternut squash, string beans, spinach, broccoli, cauliflower - florets only!)   ? Canned fruit (in natural juice or with Splenda®)  ? Fat-free, cholesterol-free egg substitute (P)  ? Low-fat or fat-free cottage cheese (P)  ? Low-fat or fat-free yogurt (P)  ? Low-fat or fat-free [place] yogurt (P)  ? Fat-free milk or 1% milk (P)  ? Lactaid fat-free or 1% low fat milk (P)  ? Low-fat well-cooked/soft beans (the consistency of refried beans) (P)  ?  No sugar added, low fat pudding (no pistachio or other flavor containing nuts)  ? low-fat cream soups  ? Low-fat chicken noodle or chicken rice soup (P)  ? Sugar-free fudgesicles  ? Sugar-free cocoa  ? Fat free whipped or mashed potatoes   ? Herbs and spices  ? Lite butter, margarine, canola oil, olive oil, reduced-fat or fat-free mayonnaise, reduced-fat or fat-free salad dressing, reduced-fat or fat-free cream cheese, reduced-fat or fat-free sour cream.        P designates food sources of protein. Include a protein at each meal.     If a food does not contain protein, you may want to consider adding protein powder to the food to give it extra protein. For example, mix protein powder in with the following: oatmeal, mashed potatoes, sugar-free pudding, sugar-free gelatin (see recipes in book), no-sugar-added applesauce. Soft Mushy Diet: Phase 1  Time Meal or Snack Soft/Mushy Food Amount (ounces) Protein  (g) Supplement   6:30 am Sip on Fluids Sip on non-carbonated, calorie-free, no sugar added liquids. 8 oz   0 g Take Multivitamin containing 18 mg ferrous sulfate (iron)    7:00 am   Stop drinking fluids 30 minutes before breakfast   7:30 am Breakfast ½ cup sugar-free oatmeal with 1 scoop of protein powder. Add cinnamon, nutmeg, artificial sweeteners as desired for flavor. 4 oz    20-25 g    9:00 Snack  (optional) High Protein Gelatin (see recipe in book) 4oz 10 g    11:30 am Stop drinking liquids 30 minutes before lunch   12:00 pm Lunch Sip low-fat cream of potato soup or low-fat cream of chicken soup mixed with 1 scoop of protein powder 8 oz soup 25 g Take 400 mg calcium citrate   2:00 Snack  (optional) ½ cup high protein pudding (see recipe in book)   or   ½ cup low-fat cottage cheese or yogurt. Can also add protein powder as needed. 4 oz 14 g    or    5 g      3:00 - 5:30 pm   Sip on Fluids   Sip on non-carbonated, calorie-free, no sugar added liquids. 24 - 32 oz   0 g   Take 400 mg calcium citrate. 6:00 pm Dinner ¼ cup low-fat, well cooked beans (ex. black beans, low-fat refried beans)  ¼ cup no-sugar-added applesauce 4 oz 3.5 g Take 400 mg of calcium citrate.    7:00 - 10:00 pm Sip on Fluids Sip on non-carbonated, no sugar added liquids as needed  16-24 oz 0 g Take Multivitamin with 18 mg ferrous sulfate   Total:  80 oz clear fluids 63-77  grams 2 Multivitamins with 18 mg ferrous sulfate, 2790-3832 mg calcium citrate

## 2018-05-03 NOTE — PROGRESS NOTES
Chief Complaint   Patient presents with    Surgical Follow-up     2 weeks s/p lap sleeve gastrectomy by Dr. Lucy Nolan down 34 pounds. Jose Whittaker is 2 weeks status post Sleeve gastrectomy for treatment of morbid obesity. Presents today for obesity management. Patient has lost 34 lbs. Since surgery. Patient is satisfied with progress. Patient is consuming about 55 grams of protein daily. Premiere and protein water   Patient is drinking 32 oz of fluids per day. Bowels moving most days. No reflux, night-time cough, heartburn or regurgitation. No fever or chills, chest pain or shortness of breath. Vitamin compliance yes  Activity  walking    Physical Exam  Visit Vitals    /80    Pulse 96    Temp 99 °F (37.2 °C) (Oral)    Resp 18    Ht 5' 8\" (1.727 m)    Wt 240 lb (108.9 kg)    SpO2 96%    BMI 36.49 kg/m2     A + O x 3  Chest  CTA, unlabored   COR  RRR  ABD Soft, lap sites are C/D/I and no erythema or induration, some tenderness right side as expected; obese, ND, no masses or hernias   EXT No edema; ambulating independently       ICD-10-CM ICD-9-CM    1. Follow-up examination following surgery Z09 V67.00    2. Severe obesity (BMI 35.0-39.9) ([facility]) E66.01 278.01          Jose Whittaker is 2 weeks  s/p Sleeve gastrectomy for treatment of morbid obesity  doing well   Diet soft stage I   Continue vitamins and protein   Activity walking and may swim in a pool  Follow-up in 2 weeks  Support group  Jose Whittaker verbalized understanding and questions were answered to the best of my knowledge and ability. diet educational materials were provided. 12 minutes spent in face to face with Jose Whittaker > 50% counseling.

## 2018-05-03 NOTE — MR AVS SNAPSHOT
[address] [facility] 7 [id number] [phone] Patient: Jesus Frederick MRN: [id number] :1980 Visit Information Date & Time Provider Department Dept. Phone Encounter #  
 5/3/2018  1:00 PM Maggie Hubbard NP [facility] 22 [phone] [id number] Your Appointments 2018  4:00 PM  
POST OP 10 MIN with Raymon Blackman NP  
[facility] [address] [id number] ([facility]) Appt Note: 4weeks [address] [facility] [phone]  
[address] [phone]  
  
    
 2018  4:00 PM  
POST OP 10 MIN with Raymon Blackman NP  
[facility] 22 [phone] ([facility]) Appt Note: 6weeks [address] 63 [facility] 7 [id number] [phone] Upcoming Health Maintenance Date Due Pneumococcal 19-64 Medium Risk (1 of 1 - PPSV23) 1997 PAP AKA CERVICAL CYTOLOGY 1999 Influenza Age 5 to Adult 2018 DTaP/Tdap/Td series (2 - Td) 3/12/2027 Allergies as of 5/3/2018  Review Complete On: 5/3/2018 By: Maggie Hubbard NP No Known Allergies Current Immunizations  Reviewed on 2018 Name Date Tdap 3/12/2017  5:49 PM  
  
 Not reviewed this visit Vitals BP Pulse Temp Resp Height(growth percentile) Weight(growth percentile) 120/80 96 99 °F (37.2 °C) (Oral) 18 5' 8\" (1.727 m) 240 lb (108.9 kg) SpO2 BMI OB Status Smoking Status 96% 36.49 kg/m2 Having regular periods Former Smoker Vitals History BMI and BSA Data Body Mass Index Body Surface Area  
 36.49 kg/m 2 2.29 m 2 [facility] [phone] Your Updated Medication List  
  
   
 This list is accurate as of 5/3/18  1:42 PM.  Always use your most recent med list.  
  
  
  
  
 cholecalciferol 50,000 unit capsule Commonly known as:  VITAMIN D3 Take  by mouth every seven (7) days. Citracal + D tablet Generic drug:  calcium citrate-vitamin D3 Take 2 Tabs by mouth daily. ergocalciferol 50,000 unit capsule Commonly known as:  VITAMIN D2 Take 1 Cap by mouth every Monday and Friday. hyoscyamine SL 0.125 mg SL tablet Commonly known as:  LEVSIN/SL  
1 Tab by SubLINGual route every four (4) hours as needed (upper abdominal pressure / spasm associated with drinking). melatonin 5 mg Cap capsule Take 5 mg by mouth nightly as needed. MULTIVITAMIN PO Take  by mouth daily. NEBULIZER  
by Does Not Apply route as needed. omeprazole 20 mg capsule Commonly known as:  PRILOSEC Take 40 mg by mouth daily. * PROAIR HFA 90 mcg/actuation inhaler Generic drug:  albuterol Take 2 Puffs by inhalation as needed. * albuterol sulfate 2.5 mg/0.5 mL Nebu nebulizer solution Commonly known as:  PROVENTIL;VENTOLIN  
2.5 mg by Nebulization route as needed for Wheezing. VITAMIN B-12 PO Take 3,000 mcg by mouth daily. VITAMIN C PO Take 500 mg by mouth daily. * Notice: This list has 2 medication(s) that are the same as other medications prescribed for you. Read the directions carefully, and ask your doctor or other care provider to review them with you. Patient Instructions What you need to know: 1. Advance your diet to soft foods. Follow the handout that you were given today in the office. 2.  Take the recommended vitamins daily 3. No lifting greater than 20 lbs. 4.  You can do light jogging and walking. 5  Follow up in 2 weeks. 6.  You may go into a pool. 7.  If you are not able to tolerate liquids or soft foods. Please call our office. [phone] 8.  If you have vomiting and persistent epigastric pain or chest pain. You should call our office, the doctor on-call or go to the emergency room. What to do if you are constipated: You may  take Milk of Magnesia. Take 2 Tablespoons followed by 16 oz of water then 2 hours later take another 2 tablespoons. If  milk of magnesia does not work then take [place] or Miralax over the counter. Keep in mind that the Benefiber or Miralax may take a day or two to work. If all of the above do not work try a Fleets enema and follow the directions on the box. Soft and Mushy: Phase 1 Below is a list of basic items to purchase for the first phase of the  
soft mushy diet. Your surgeon or nurse practitioner will inform you when it is okay  
to advance to the next phase. Soft and Mushy Foods: Prepare food to the appropriate texture. ? Everything on clear and full liquid diet ? Applesauce (no sugar added) ? Hot & cold cereals (Cream of Wheat, Plain Cheerios®, Special K with protein®, plain oatmeal, grits) ? Frozen or canned vegetables (carrots, acorn squash, butternut squash, string beans, spinach, broccoli, cauliflower  florets only!) ? Canned fruit (in natural juice or with Splenda®) ? Fat-free, cholesterol-free egg substitute (P) ? Low-fat or fat-free cottage cheese (P) ? Low-fat or fat-free yogurt (P) ? Low-fat or fat-free [place] yogurt (P) ? Fat-free milk or 1% milk (P) ? Lactaid fat-free or 1% low fat milk (P) ? Low-fat well-cooked/soft beans (the consistency of refried beans) (P) ? No sugar added, low fat pudding (no pistachio or other flavor containing nuts) ? low-fat cream soups ? Low-fat chicken noodle or chicken rice soup (P) ? Sugar-free fudgesicles ? Sugar-free cocoa ? Fat free whipped or mashed potatoes ? Herbs and spices ?  Lite butter, margarine, canola oil, olive oil, reduced-fat or fat-free mayonnaise, reduced-fat or fat-free salad dressing, reduced-fat or fat-free cream cheese, reduced-fat or fat-free sour cream.  
 
 
 P designates food sources of protein. Include a protein at each meal.  
 
If a food does not contain protein, you may want to consider adding protein powder to the food to give it extra protein. For example, mix protein powder in with the following: oatmeal, mashed potatoes, sugar-free pudding, sugar-free gelatin (see recipes in book), no-sugar-added applesauce. Soft Mushy Diet: Phase 1 Time Meal or Snack Soft/Mushy Food Amount (ounces) Protein 
(g) Supplement 6:30 am Sip on Fluids Sip on non-carbonated, calorie-free, no sugar added liquids. 8 oz 
 0 g Take Multivitamin containing 18 mg ferrous sulfate (iron) 7:00 am   Stop drinking fluids 30 minutes before breakfast  
7:30 am Breakfast ½ cup sugar-free oatmeal with 1 scoop of protein powder. Add cinnamon, nutmeg, artificial sweeteners as desired for flavor. 4 oz  
 20-25 g   
9:00 Snack (optional) High Protein Gelatin (see recipe in book) 4oz 10 g   
11:30 am Stop drinking liquids 30 minutes before lunch 12:00 pm Lunch Sip low-fat cream of potato soup or low-fat cream of chicken soup mixed with 1 scoop of protein powder 8 oz soup 25 g Take 400 mg calcium citrate 2:00 Snack (optional) ½ cup high protein pudding (see recipe in book) or ½ cup low-fat cottage cheese or yogurt. Can also add protein powder as needed. 4 oz 14 g 
 
or 
 
5 g   
 
3:00  5:30 pm  
Sip on Fluids Sip on non-carbonated, calorie-free, no sugar added liquids. 24 - 32 oz  
0 g Take 400 mg calcium citrate. 6:00 pm Dinner ¼ cup low-fat, well cooked beans (ex. black beans, low-fat refried beans) ¼ cup no-sugar-added applesauce 4 oz 3.5 g Take 400 mg of calcium citrate. 7:00 - 10:00 pm Sip on Fluids Sip on non-carbonated, no sugar added liquids as needed  16-24 oz 0 g Take Multivitamin with 18 mg ferrous sulfate Total:  80 oz clear fluids 63-77 grams 2 Multivitamins with 18 mg ferrous sulfate, 4296-9867 mg calcium citrate Introducing [facility]! [facility] introduces [employer] patient portal. Now you can access parts of your medical record, email your doctor's office, and request medication refills online. 1. In your internet browser, go to [email or web address] 2. Click on the First Time User? Click Here link in the Sign In box. You will see the New Member Sign Up page. 3. Enter your [employer] Access Code exactly as it appears below. You will not need to use this code after youve completed the sign-up process. If you do not sign up before the expiration date, you must request a new code. · [employer] Access Code: [email or web address] Expires: 5/24/2018 12:09 PM 
 
4. Enter the last four digits of your Social Security Number (xxxx) and Date of Birth (mm/dd/yyyy) as indicated and click Submit. You will be taken to the next sign-up page. 5. Create a [employer] ID. This will be your [employer] login ID and cannot be changed, so think of one that is secure and easy to remember. 6. Create a [employer] password. You can change your password at any time. 7. Enter your Password Reset Question and Answer. This can be used at a later time if you forget your password. 8. Enter your e-mail address. You will receive e-mail notification when new information is available in [address] 9. Click Sign Up. You can now view and download portions of your medical record. 10. Click the Download Summary menu link to download a portable copy of your medical information. If you have questions, please visit the Frequently Asked Questions section of the [employer] website. Remember, [employer] is NOT to be used for urgent needs. For medical emergencies, dial 911. Now available from your iPhone and Android! Please provide this summary of care documentation to your next provider. Your primary care clinician is listed as Gaudencio Nicolas. If you have any questions after today's visit, please call [phone].

## 2018-05-08 ENCOUNTER — TELEPHONE (OUTPATIENT)
Dept: SURGERY | Age: 38
End: 2018-05-08

## 2018-05-08 NOTE — TELEPHONE ENCOUNTER
Spoke with patient who states,she has a cough. Coughing up green phlegm. This is making her stomach hurt. Is this normal. Informed patient this is normal.  Call  PCP for appointment. also her throat is sore. She is only drinking  Warm liquids.

## 2018-05-08 NOTE — TELEPHONE ENCOUNTER
Left message for patient  she can take cough syrup with DM. She also can call PCP. she can refer to her booklet on page 79. How  to take medication after surgery. Lisandra Terrell

## 2018-05-09 ENCOUNTER — TELEPHONE (OUTPATIENT)
Dept: SURGERY | Age: 38
End: 2018-05-09

## 2018-05-09 NOTE — TELEPHONE ENCOUNTER
----- Message from [employer], SHAUNNAN sent at 3/87/2222  9:45 AM EDT -----  Regarding: 3 weeks post op call  Admit Date:4/18     Pt is POD #2 s/p laparoscopic sleeve gastrectomy and is being discharged to home today Friday 4/20 after a routine post op course.       Thanks,   Ford Dandy PA-C

## 2018-05-09 NOTE — TELEPHONE ENCOUNTER
----- Message from Fawn Flores LPN sent at 2/82/1468  9:45 AM EDT -----  Regarding: 3 weeks post op call  Admit Date:4/18     Pt is POD #2 s/p laparoscopic sleeve gastrectomy and is being discharged to home today Friday 4/20 after a routine post op course.       Thanks,   Brisa Marquez PA-C

## 2018-05-11 NOTE — TELEPHONE ENCOUNTER
Bariatric Post-Operative Phone Calls: Week 3    Diet:Question of any nausea and/or vomiting. Question of tolerance to diet advancement from liquids to solids. Protein intake (goal is 60 grams of protein daily)   Poor____Fair____Good____Great__x__     Comment:_60 grams_____________________________________________________________      ______________________________________________________________________    Hydration:Less than 32 ounces of water daily is fair to poor (Goal is 64 ounces per day)   Poor____ Fair____ Good____Great____    Comment:_40 to 42 oz_____________________________________________________________    ______________________________________________________________________      Ambulation:( walking at least 3 x week, for at least 30 minutes)   Poor______ Fair______ Good______     Great______ Comment:___daily 20 to 30 minutes_______________________________________________    ______________________________________________________________________      Urine Color: Question of any odor and color(should be ivon, pale, and clear) Dark______ Amber______ Pale______      Clear_x_____ Comment:___________________________________________________                           ________________________________________________________________    Bowel movements: Question of any constipation- haven't had any bowel movements for more than 3 days. This could be related to protein intake and/or narcotic pain medication usage. Comment:                                                                                        Every other day                                      Pain: Left sided abdominal pain is normal (should be less than 3)         Question if pain medication is helpful.  10___ 9___ 8___ 7___ 6___ 5___ 4___ 3___     2___1___0_x__Comment:_________________________________________________    ______________________________________________________________________      Incision: (No redness, pain, swelling or fever) Healing Well___x___     Healed______Redness_________ Pain_________     Swelling_________ Fever__________(greater than 101 needs evaluation)    Comment:____________________________________________________________    ______________________________________________________________________  Use of incentive spirometer: Yes__x__       No           Next Appointment:_5/23/17_____________                 Support Group: Yes______No__x____    Additional Comments:____________________________________________________________    ____________________________________________________________________      If more than one parameter is not met or considered poor, nurse needs to discuss with provider recommend for patient to be seen in the office as soon as possible or refer to the provider for follow-up. Reinforce to patient to use bariatric educational booklet as guide. It is appropriate to refer patient to the nutritionist to discuss more in detail of diet and nutrition.

## 2018-05-22 ENCOUNTER — OFFICE VISIT (OUTPATIENT)
Dept: SURGERY | Age: 38
End: 2018-05-22

## 2018-05-22 VITALS
TEMPERATURE: 98.3 F | OXYGEN SATURATION: 98 % | HEIGHT: 68 IN | BODY MASS INDEX: 36.28 KG/M2 | HEART RATE: 81 BPM | WEIGHT: 239.4 LBS | DIASTOLIC BLOOD PRESSURE: 76 MMHG | RESPIRATION RATE: 18 BRPM | SYSTOLIC BLOOD PRESSURE: 110 MMHG

## 2018-05-22 DIAGNOSIS — Z90.3 HISTORY OF SLEEVE GASTRECTOMY: ICD-10-CM

## 2018-05-22 DIAGNOSIS — Z09 POSTOPERATIVE EXAMINATION: ICD-10-CM

## 2018-05-22 DIAGNOSIS — E66.01 SEVERE OBESITY (BMI 35.0-39.9): Primary | ICD-10-CM

## 2018-05-22 RX ORDER — LORATADINE 10 MG/1
TABLET ORAL
Refills: 1 | COMMUNITY
Start: 2018-05-14

## 2018-05-22 NOTE — MR AVS SNAPSHOT
[address] [facility] 63 [facility] [place] [address] [address] 
[phone] Patient: Arturo Fuentes MRN: [id number] :1980 Visit Information Date & Time Provider Department Dept. Phone Encounter #  
 2018 10:20 AM Roderick Gomez MD [address] 14 [phone] [id number] Your Appointments 2018  4:00 PM  
POST OP 10 MIN with Hermila Coy NP  
[phone] Appt Note: 6weeks [address] [facility] 63 [facility] [id number]  
[address] [place] [address] Upcoming Health Maintenance Date Due Pneumococcal 19-64 Medium Risk (1 of 1 - PPSV23) 1997 PAP AKA CERVICAL CYTOLOGY 1999 Influenza Age 5 to Adult 2018 DTaP/Tdap/Td series (2 - Td) 3/12/2027 Allergies as of 2018  Review Complete On: 2018 By: Roderick Gomez MD  
 No Known Allergies Current Immunizations  Reviewed on 2018 Name Date Tdap 3/12/2017  5:49 PM  
  
 Not reviewed this visit You Were Diagnosed With   
  
 Codes Comments Severe obesity (BMI 35.0-39.9) (HCC)    -  Primary ICD-10-CM: E66.01 
ICD-9-CM: 278.01 History of sleeve gastrectomy     ICD-10-CM: Z90.3 ICD-9-CM: V15.29 Postoperative examination     ICD-10-CM: W69 ICD-9-CM: V67.00 Vitals BP Pulse Temp Resp Height(growth percentile) Weight(growth percentile) 110/76 81 98.3 °F (36.8 °C) (Oral) 18 5' 8\" (1.727 m) 239 lb 6.4 oz (108.6 kg) SpO2 BMI OB Status Smoking Status 98% 36.4 kg/m2 Having regular periods Former Smoker Vitals History BMI and BSA Data Body Mass Index Body Surface Area  
 36.4 kg/m 2 2.28 m 2 [facility] [address] [facility] [phone] Your Updated Medication List  
  
   
This list is accurate as of 5/22/18  1:06 PM.  Always use your most recent med list.  
  
  
  
  
 cholecalciferol 50,000 unit capsule Commonly known as:  VITAMIN D3 Take  by mouth every seven (7) days. Citracal + D tablet Generic drug:  calcium citrate-vitamin D3 Take 2 Tabs by mouth daily. ergocalciferol 50,000 unit capsule Commonly known as:  VITAMIN D2 Take 1 Cap by mouth every Monday and Friday. hyoscyamine SL 0.125 mg SL tablet Commonly known as:  LEVSIN/SL  
1 Tab by SubLINGual route every four (4) hours as needed (upper abdominal pressure / spasm associated with drinking). loratadine 10 mg tablet Commonly known as:  CLARITIN  
TK 1 T PO QD  
  
 melatonin 5 mg Cap capsule Take 5 mg by mouth nightly as needed. MULTIVITAMIN PO Take  by mouth daily. NEBULIZER  
by Does Not Apply route as needed. omeprazole 20 mg capsule Commonly known as:  PRILOSEC Take 40 mg by mouth daily. * PROAIR HFA 90 mcg/actuation inhaler Generic drug:  albuterol Take 2 Puffs by inhalation as needed. * albuterol sulfate 2.5 mg/0.5 mL Nebu nebulizer solution Commonly known as:  PROVENTIL;VENTOLIN  
2.5 mg by Nebulization route as needed for Wheezing. VITAMIN B-12 PO Take 3,000 mcg by mouth daily. VITAMIN C PO Take 500 mg by mouth daily. * Notice: This list has 2 medication(s) that are the same as other medications prescribed for you. Read the directions carefully, and ask your doctor or other care provider to review them with you. Introducing [facility]! Scott Dorado introduces Transit[employer] patient portal. Now you can access parts of your medical record, email your doctor's office, and request medication refills online. 1. In your internet browser, go to [email or web address] 2. Click on the First Time User? Click Here link in the Sign In box.  You will see the New Member Sign Up page. 3. Enter your ISpeak Access Code exactly as it appears below. You will not need to use this code after youve completed the sign-up process. If you do not sign up before the expiration date, you must request a new code. · ISpeak Access Code: [email or web address] Expires: 5/24/2018 12:09 PM 
 
4. Enter the last four digits of your Social Security Number (xxxx) and Date of Birth (mm/dd/yyyy) as indicated and click Submit. You will be taken to the next sign-up page. 5. Create a ISpeak ID. This will be your ISpeak login ID and cannot be changed, so think of one that is secure and easy to remember. 6. Create a ISpeak password. You can change your password at any time. 7. Enter your Password Reset Question and Answer. This can be used at a later time if you forget your password. 8. Enter your e-mail address. You will receive e-mail notification when new information is available in [address]. 9. Click Sign Up. You can now view and download portions of your medical record. 10. Click the Download Summary menu link to download a portable copy of your medical information. If you have questions, please visit the Frequently Asked Questions section of the ISpeak website. Remember, ISpeak is NOT to be used for urgent needs. For medical emergencies, dial 911. Now available from your iPhone and Android! Please provide this summary of care documentation to your next provider. Your primary care clinician is listed as Nahum Vizcaino. If you have any questions after today's visit, please call [phone].

## 2018-05-22 NOTE — PROGRESS NOTES
1. Have you been to the ER, urgent care clinic since your last visit? Hospitalized since your last visit? No    2. Have you seen or consulted any other health care providers outside of the [address] [place] [address] since your last visit? Include any pap smears or colon screening. Yes, PCP  For cold.

## 2018-05-22 NOTE — PROGRESS NOTES
Subjective:   Shabbir Last is a 34 y.o. female presents for postop care 5 weeks following laparoscopic sleeve gastrectomy. She denies sticking, nausea, and vomiting. She reports she has been measuring her meals. She eats 2 oz every 2 hours. She reports occasionally feeling some bubbling in her throat. She reports she is drinking protein shakes and avoiding sugar. She has also been exercising. She reports she goes on the treadmill 2-3x a week and goes to the pool often. Appetite is good. Eating a mushy diet without difficulty. The patient is not having any pain. .    Objective:     Visit Vitals    /76    Pulse 81    Temp 98.3 °F (36.8 °C) (Oral)    Resp 18    Ht 5' 8\" (1.727 m)    Wt 239 lb 6.4 oz (108.6 kg)    SpO2 98%    BMI 36.4 kg/m2       Phys Exam: deferred      Pathology: n/a    Assessment:     Encounter Diagnoses     ICD-10-CM ICD-9-CM   1. Severe obesity (BMI 35.0-39.9) ([facility]) E66.01 278.01   2. History of sleeve gastrectomy Z90.3 V15.29   3. Postoperative examination Z09 V67.00      S/P lap sleeve gastrectomy. Doing well postoperatively. Approx 20 lbs weight loss since operation. Plan:      1. Advance to reg diet  2. Pt is to increase activities as tolerated. .  3. Follow-up with [address] [place] [address]. Total face to face time with patient: 9 minutes. Greater than 50% of the time was spent in counseling.  10:35 - 10:44      Signed By: Mary Kebede MD     May 22, 2018

## 2018-06-14 ENCOUNTER — OFFICE VISIT (OUTPATIENT)
Dept: SURGERY | Age: 38
End: 2018-06-14

## 2018-06-14 VITALS
DIASTOLIC BLOOD PRESSURE: 79 MMHG | HEART RATE: 83 BPM | RESPIRATION RATE: 20 BRPM | TEMPERATURE: 98.7 F | HEIGHT: 68 IN | WEIGHT: 236 LBS | OXYGEN SATURATION: 95 % | SYSTOLIC BLOOD PRESSURE: 116 MMHG | BODY MASS INDEX: 35.77 KG/M2

## 2018-06-14 DIAGNOSIS — Z98.84 S/P LAPAROSCOPIC SLEEVE GASTRECTOMY: ICD-10-CM

## 2018-06-14 DIAGNOSIS — Z09 SURGICAL FOLLOW-UP CARE: Primary | ICD-10-CM

## 2018-06-14 NOTE — PATIENT INSTRUCTIONS
Learning About Changing a Habit by Setting Goals  How can you change a habit? If you've decided to change a habit-whether it's quitting smoking, lowering your blood pressure, becoming more active, or doing something else to improve your health-congratulations! Making that decision is the first step toward making a change. What happens next? Have a reason. Set goals you can reach. Prepare for slip-ups. And get support. What's your reason? Your reason for wanting to change a habit is really important. Maybe you want to quit smoking so that you can avoid future health problems. Or maybe you want to eat a healthier diet so you can lose weight. If you have high blood pressure, your reason may be clear: to lower your blood pressure. Maybe you smoke and want to save money on cigarettes. You need to feel ready to make a change. If you don't feel ready now, that's okay. You can still be thinking and planning. When you truly want to make changes, you're ready for the next step. It's not easy to change habits-but you can do it. Taking the time to really think about what will motivate or inspire you will help you reach your goals. How do you set goals? · Focus on small goals. This will help you reach larger goals over time. With smaller goals, you'll have success more often, which will help you stay with it. For example, your large goal may be to lose 50 pounds. Your small goal could be to lose 5.  · Write down your goals. This will help you remember, and you'll have a clearer idea of what you want to achieve. Use a journal or notebook to record your goals. Hang up your plan where you will see it often as a reminder of what you're trying to do. · Make your goals specific. Specific goals help you measure your progress. For example, setting a goal to eat 5 helpings of fruits and vegetables 5 days a week is better than a general goal to \"eat more vegetables. \"  · Focus on one goal at a time.  By doing this, you're less likely to feel overwhelmed and then give up. · When you reach a goal, reward yourself. Celebrate your new behavior and success for several days, and then think about setting your next goal.  How can you prepare for slip-ups? It's perfectly normal to try to change a habit, go along fine for a while, and then have a setback. Lots of people try and try again before they reach their goals. What are the things that might cause a setback for you? If you have tried to change a habit before, think about what helped you and what got in your way. By thinking about these barriers now, you can plan ahead for how to deal with them if they happen. There will be times when you slip up and don't make your goal for the week. When that happens, don't get mad at yourself. Learn from the experience. Ask yourself what got in the way of reaching your goal. Positive thinking goes a long way when you're making lifestyle changes. How can you get support? · Get a partner. It's motivating to know that someone is trying to make the same change that you're making, like being more active or changing your eating habits. You have someone who is counting on you to help him or her succeed. That person can also remind you how far you've come. · Get friends and family involved. They can exercise with you or encourage you by saying how they admire what you are doing. Family members can join you in your healthy eating efforts. Don't be afraid to tell family and friends that their encouragement makes a big difference to you. · Join a class or support group. People in these groups often have some of the same barriers you have. They can give you support when you don't feel like staying with your plan. They can boost your morale when you need a lift. Kierra Curtis also find a number of online support groups. · Encourage yourself. When you feel like giving up, don't waste energy feeling bad about yourself.  Remember your reason for wanting to change, think about the progress you've made, and give yourself a pep talk and a pat on the back. · Get professional help. A dietitian can help you make your diet healthier while still allowing you to eat foods that you enjoy. A  or physical therapist can help design an exercise program that is fun and easy to stay on. A counselor, a , or your doctor can help you overcome hurdles, reduce stress, or quit smoking. Where can you learn more? Go to http://yulisa-willian.info/. Enter S114 in the search box to learn more about \"Learning About Changing a Habit by Setting Goals. \"  Current as of: May 12, 2017  Content Version: 11.4  © 2594-4207 [employer], [employer]. Care instructions adapted under license by [employer] (which disclaims liability or warranty for this information). If you have questions about a medical condition or this instruction, always ask your healthcare professional. [place] [address] any warranty or liability for your use of this information.

## 2018-06-14 NOTE — PROGRESS NOTES
Olga Manzano is a 34 y.o. female 8 weeks s/p lap sleeve gastrectomy, down 38 pounds. Weight today is 236 pounds. Patient stated doing well and pleased Denies nausea, no vomiting, no heartburn/reflux. Denies dysphagia. No fever/no chills, no shortness of breath, no chest pain, and no abdominal pain. Tolerating all foods except chicken breast, getting 60+ grams of protein daily. Protein supplementation in use with [employer]. Drinking at least 50 ounces of water daily. Tolerating all vitamins and medications. Exercising with going to gym with cardio workout and also doing Kristyn. No issues with urination. Bowel movements once daily that are formed. HPI    Review of Systems   Constitutional: Negative for chills, fever and malaise/fatigue. Respiratory: Negative for cough, sputum production and shortness of breath. Cardiovascular: Negative for chest pain, palpitations and leg swelling. Gastrointestinal: Negative for abdominal pain, blood in stool, constipation, diarrhea, heartburn, nausea and vomiting. Genitourinary: Negative for dysuria. Neurological: Negative for dizziness. Physical Exam   Constitutional: She is oriented to person, place, and time. She appears well-developed and well-nourished. No distress. Cardiovascular: Normal rate, regular rhythm and normal heart sounds. Pulmonary/Chest: Effort normal and breath sounds normal. No respiratory distress. She has no wheezes. She has no rales. Abdominal: Soft. Bowel sounds are normal. She exhibits no distension. There is no tenderness. There is no rebound and no guarding. Lap sites dry and intact   Musculoskeletal: Normal range of motion. She exhibits no edema. Neurological: She is alert and oriented to person, place, and time. Skin: Skin is warm and dry. No rash noted. No erythema. Psychiatric: She has a normal mood and affect.  Her behavior is normal. Thought content normal.   Blood pressure 116/79, pulse 83, temperature 98.7 °F (37.1 °C), temperature source Oral, resp. rate 20, height 5' 8\" (1.727 m), weight 236 lb (107 kg), SpO2 95 %. ASSESSMENT and PLAN  Morbid Obesity  8 weeks s/p lap sleeve gastrectomy, down 38 pounds. Weight today is 236 pounds. Advised patient regard to diet that is high-protein, low-fat, low-sugar, limited carbohydrates. Strive for 60 grams of protein daily. If having a snack, foods that are protein or fiber rich. Still pay attention to behavioral factor and habits. No eating/drinking together, chew foods well, and portion control. Drink at least 40 ounces of non-carbonated, non-calorie beverages daily. Continue vitamin regiment daily. Exercise at least 3 days a week with cardiovascular and strength training. Patient to follow up in 1 months. Patient verbalized understanding and questions were answered to the best of my knowledge and ability. Goal setting educational materials were provided. Advised to call office if any questions/concerns.

## 2018-06-14 NOTE — MR AVS SNAPSHOT
[address] [place] [facility] [address] 
[phone] Patient: Yordy Vieira MRN: [id number] :1980 Visit Information Date & Time Provider Department Dept. Phone Encounter #  
 2018  3:00 PM ARLET Hardin 137 [phone] [id number] Follow-up Instructions Return in about 1 month (around 2018). Upcoming Health Maintenance Date Due Pneumococcal 19-64 Medium Risk (1 of 1 - PPSV23) 1997 PAP AKA CERVICAL CYTOLOGY 1999 Influenza Age 5 to Adult 2018 DTaP/Tdap/Td series (2 - Td) 3/12/2027 Allergies as of 2018  Review Complete On: 2018 By: Otto Waters No Known Allergies Current Immunizations  Reviewed on 2018 Name Date Tdap 3/12/2017  5:49 PM  
  
 Not reviewed this visit You Were Diagnosed With   
  
 Codes Comments Surgical follow-up care    -  Primary ICD-10-CM: I96 ICD-9-CM: V67.00 S/P laparoscopic sleeve gastrectomy     ICD-10-CM: J82.61 ICD-9-CM: V45.86 BMI 35.0-35.9,adult     ICD-10-CM: Q51.21 ICD-9-CM: V85.35 Vitals BP Pulse Temp Resp Height(growth percentile) Weight(growth percentile) 116/79 (BP 1 Location: Left arm, BP Patient Position: Sitting) 83 98.7 °F (37.1 °C) (Oral) 20 5' 8\" (1.727 m) 236 lb (107 kg) SpO2 BMI OB Status Smoking Status 95% 35.88 kg/m2 Having regular periods Former Smoker Vitals History BMI and BSA Data Body Mass Index Body Surface Area  
 35.88 kg/m 2 2.27 m 2 Preferred Pharmacy Pharmacy Name Phone [facility] [address] [facility] [phone] Your Updated Medication List  
  
   
This list is accurate as of 18  3:16 PM.  Always use your most recent med list.  
  
  
  
  
 cholecalciferol 50,000 unit capsule Commonly known as:  VITAMIN D3  
 Take  by mouth every seven (7) days. Citracal + D tablet Generic drug:  calcium citrate-vitamin D3 Take 2 Tabs by mouth daily. ergocalciferol 50,000 unit capsule Commonly known as:  VITAMIN D2 Take 1 Cap by mouth every Monday and Friday. hyoscyamine SL 0.125 mg SL tablet Commonly known as:  LEVSIN/SL  
1 Tab by SubLINGual route every four (4) hours as needed (upper abdominal pressure / spasm associated with drinking). loratadine 10 mg tablet Commonly known as:  CLARITIN  
TK 1 T PO QD  
  
 melatonin 5 mg Cap capsule Take 5 mg by mouth nightly as needed. MULTIVITAMIN PO Take  by mouth daily. NEBULIZER  
by Does Not Apply route as needed. omeprazole 20 mg capsule Commonly known as:  PRILOSEC Take 40 mg by mouth daily. * PROAIR HFA 90 mcg/actuation inhaler Generic drug:  albuterol Take 2 Puffs by inhalation as needed. * albuterol sulfate 2.5 mg/0.5 mL Nebu nebulizer solution Commonly known as:  PROVENTIL;VENTOLIN  
2.5 mg by Nebulization route as needed for Wheezing. VITAMIN B-12 PO Take 3,000 mcg by mouth daily. VITAMIN C PO Take 500 mg by mouth daily. * Notice: This list has 2 medication(s) that are the same as other medications prescribed for you. Read the directions carefully, and ask your doctor or other care provider to review them with you. Follow-up Instructions Return in about 1 month (around 7/14/2018). Patient Instructions Learning About Changing a Habit by Setting Goals How can you change a habit? If you've decided to change a habit-whether it's quitting smoking, lowering your blood pressure, becoming more active, or doing something else to improve your health-congratulations! Making that decision is the first step toward making a change. What happens next? Have a reason. Set goals you can reach. Prepare for slip-ups. And get support. What's your reason? Your reason for wanting to change a habit is really important. Maybe you want to quit smoking so that you can avoid future health problems. Or maybe you want to eat a healthier diet so you can lose weight. If you have high blood pressure, your reason may be clear: to lower your blood pressure. Maybe you smoke and want to save money on cigarettes. You need to feel ready to make a change. If you don't feel ready now, that's okay. You can still be thinking and planning. When you truly want to make changes, you're ready for the next step. It's not easy to change habits-but you can do it. Taking the time to really think about what will motivate or inspire you will help you reach your goals. How do you set goals? · Focus on small goals. This will help you reach larger goals over time. With smaller goals, you'll have success more often, which will help you stay with it. For example, your large goal may be to lose 50 pounds. Your small goal could be to lose 5. 
· Write down your goals. This will help you remember, and you'll have a clearer idea of what you want to achieve. Use a journal or notebook to record your goals. Hang up your plan where you will see it often as a reminder of what you're trying to do. · Make your goals specific. Specific goals help you measure your progress. For example, setting a goal to eat 5 helpings of fruits and vegetables 5 days a week is better than a general goal to \"eat more vegetables. \" 
· Focus on one goal at a time. By doing this, you're less likely to feel overwhelmed and then give up. · When you reach a goal, reward yourself. Celebrate your new behavior and success for several days, and then think about setting your next goal. 
How can you prepare for slip-ups? It's perfectly normal to try to change a habit, go along fine for a while, and then have a setback. Lots of people try and try again before they reach their goals. What are the things that might cause a setback for you? If you have tried to change a habit before, think about what helped you and what got in your way. By thinking about these barriers now, you can plan ahead for how to deal with them if they happen. There will be times when you slip up and don't make your goal for the week. When that happens, don't get mad at yourself. Learn from the experience. Ask yourself what got in the way of reaching your goal. Positive thinking goes a long way when you're making lifestyle changes. How can you get support? · Get a partner. It's motivating to know that someone is trying to make the same change that you're making, like being more active or changing your eating habits. You have someone who is counting on you to help him or her succeed. That person can also remind you how far you've come. · Get friends and family involved. They can exercise with you or encourage you by saying how they admire what you are doing. Family members can join you in your healthy eating efforts. Don't be afraid to tell family and friends that their encouragement makes a big difference to you. · Join a class or support group. People in these groups often have some of the same barriers you have. They can give you support when you don't feel like staying with your plan. They can boost your morale when you need a lift. Gisselle Bishnu also find a number of online support groups. · Encourage yourself. When you feel like giving up, don't waste energy feeling bad about yourself. Remember your reason for wanting to change, think about the progress you've made, and give yourself a pep talk and a pat on the back. · Get professional help. A dietitian can help you make your diet healthier while still allowing you to eat foods that you enjoy. A  or physical therapist can help design an exercise program that is fun and easy to stay on.  A counselor, a , or your doctor can help you overcome hurdles, reduce stress, or quit smoking. Where can you learn more? Go to http://yulisa-willian.info/. Enter D206 in the search box to learn more about \"Learning About Changing a Habit by Setting Goals. \" Current as of: May 12, 2017 Content Version: 11.4 © 2882-5256 [employer]. Care instructions adapted under license by [employer] (which disclaims liability or warranty for this information). If you have questions about a medical condition or this instruction, always ask your healthcare professional. [address] any warranty or liability for your use of this information. Introducing [address] Mayte Parekh introduces [employer] patient portal. Now you can access parts of your medical record, email your doctor's office, and request medication refills online. 1. In your internet browser, go to [email or web address] 2. Click on the First Time User? Click Here link in the Sign In box. You will see the New Member Sign Up page. 3. Enter your [employer] Access Code exactly as it appears below. You will not need to use this code after youve completed the sign-up process. If you do not sign up before the expiration date, you must request a new code. · [employer] Access Code: [email or web address] Expires: 9/12/2018  3:16 PM 
 
4. Enter the last four digits of your Social Security Number (xxxx) and Date of Birth (mm/dd/yyyy) as indicated and click Submit. You will be taken to the next sign-up page. 5. Create a Mango Healtht ID. This will be your [employer] login ID and cannot be changed, so think of one that is secure and easy to remember. 6. Create a [employer] password. You can change your password at any time. 7. Enter your Password Reset Question and Answer. This can be used at a later time if you forget your password. 8. Enter your e-mail address.  You will receive e-mail notification when new information is available in Twyxt. 9. Click Sign Up. You can now view and download portions of your medical record. 10. Click the Download Summary menu link to download a portable copy of your medical information. If you have questions, please visit the Frequently Asked Questions section of the [employer] website. Remember, [employer] is NOT to be used for urgent needs. For medical emergencies, dial 911. Now available from your iPhone and Android! Please provide this summary of care documentation to your next provider. Your primary care clinician is listed as Cameron Mayorga. If you have any questions after today's visit, please call [phone].

## 2018-08-07 ENCOUNTER — HOSPITAL ENCOUNTER (EMERGENCY)
Age: 38
Discharge: HOME | End: 2018-08-07
Attending: EMERGENCY MEDICINE
Payer: MEDICAID

## 2018-08-07 ENCOUNTER — OFFICE VISIT (OUTPATIENT)
Dept: SURGERY | Age: 38
End: 2018-08-07

## 2018-08-07 VITALS
WEIGHT: 216 LBS | HEART RATE: 64 BPM | OXYGEN SATURATION: 98 % | TEMPERATURE: 98.3 F | DIASTOLIC BLOOD PRESSURE: 90 MMHG | RESPIRATION RATE: 18 BRPM | HEIGHT: 67 IN | SYSTOLIC BLOOD PRESSURE: 128 MMHG | BODY MASS INDEX: 33.9 KG/M2

## 2018-08-07 VITALS
BODY MASS INDEX: 35 KG/M2 | RESPIRATION RATE: 16 BRPM | SYSTOLIC BLOOD PRESSURE: 107 MMHG | TEMPERATURE: 98.1 F | WEIGHT: 223 LBS | DIASTOLIC BLOOD PRESSURE: 62 MMHG | HEIGHT: 67 IN | OXYGEN SATURATION: 98 % | HEART RATE: 60 BPM

## 2018-08-07 DIAGNOSIS — A08.4 VIRAL GASTROENTERITIS: ICD-10-CM

## 2018-08-07 DIAGNOSIS — E66.01 MORBID OBESITY (HCC): Primary | ICD-10-CM

## 2018-08-07 DIAGNOSIS — K52.9 GASTROENTERITIS, ACUTE: ICD-10-CM

## 2018-08-07 DIAGNOSIS — Z98.84 S/P LAPAROSCOPIC SLEEVE GASTRECTOMY: ICD-10-CM

## 2018-08-07 DIAGNOSIS — B34.9 VIRAL ILLNESS: Primary | ICD-10-CM

## 2018-08-07 PROCEDURE — 74011250637 HC RX REV CODE- 250/637: Performed by: EMERGENCY MEDICINE

## 2018-08-07 PROCEDURE — 99283 EMERGENCY DEPT VISIT LOW MDM: CPT

## 2018-08-07 RX ORDER — ONDANSETRON 4 MG/1
4 TABLET, ORALLY DISINTEGRATING ORAL
Qty: 10 TAB | Refills: 0 | Status: SHIPPED | OUTPATIENT
Start: 2018-08-07

## 2018-08-07 RX ORDER — ONDANSETRON 4 MG/1
4 TABLET, ORALLY DISINTEGRATING ORAL
Status: COMPLETED | OUTPATIENT
Start: 2018-08-07 | End: 2018-08-07

## 2018-08-07 RX ADMIN — ONDANSETRON 4 MG: 4 TABLET, ORALLY DISINTEGRATING ORAL at 07:36

## 2018-08-07 NOTE — MR AVS SNAPSHOT
[address] [place] [address] [place] [address] [address] 
[phone] Patient: Thu Jones MRN: [id number] :1980 Visit Information Date & Time Provider Department Dept. Phone Encounter #  
 2018 11:40 AM Frank Infante NP [place] 137 [phone] [id number] Your Appointments 2018 10:40 AM  
ESTABLISHED PATIENT with Frank Infante NP  
[facility] [address] ([facility]) Appt Note: Ep/ 4 week f/u  
 [address] [place] [address] [address] [facility] [address]  
[address] [place] [address] Upcoming Health Maintenance Date Due Pneumococcal 19-64 Medium Risk (1 of 1 - PPSV23) 1997 PAP AKA CERVICAL CYTOLOGY 1999 Influenza Age 5 to Adult 2018 DTaP/Tdap/Td series (2 - Td) 3/12/2027 Allergies as of 2018  Review Complete On: 2018 By: Frank Infante NP No Known Allergies Current Immunizations  Reviewed on 2018 Name Date Tdap 3/12/2017  5:49 PM  
  
 Not reviewed this visit You Were Diagnosed With   
  
 Codes Comments Morbid obesity ([facility] [address]    -  Primary ICD-10-CM: E66.01 
ICD-9-CM: 278.01 S/P laparoscopic sleeve gastrectomy     ICD-10-CM: G83.37 ICD-9-CM: V45.86 BMI 33.0-33.9,adult     ICD-10-CM: T72.23 
ICD-9-CM: V85.33 Viral gastroenteritis     ICD-10-CM: A08.4 ICD-9-CM: 913. 8 Vitals BP Pulse Temp Resp Height(growth percentile) Weight(growth percentile) 128/90 (BP 1 Location: Right arm, BP Patient Position: Sitting) 64 98.3 °F (36.8 °C) (Oral) 18 5' 7\" (1.702 m) 216 lb (98 kg) SpO2 BMI OB Status Smoking Status 98% 33.83 kg/m2 Having regular periods Former Smoker Vitals History BMI and BSA Data Body Mass Index Body Surface Area  
 33.83 kg/m 2 2.15 m 2 Preferred Pharmacy Pharmacy Name Phone [address], [address] [facility] [phone] Your Updated Medication List  
  
   
This list is accurate as of 8/7/18  2:45 PM.  Always use your most recent med list.  
  
  
  
  
 cholecalciferol 50,000 unit capsule Commonly known as:  VITAMIN D3 Take  by mouth every seven (7) days. Citracal + D tablet Generic drug:  calcium citrate-vitamin D3 Take 2 Tabs by mouth daily. ergocalciferol 50,000 unit capsule Commonly known as:  VITAMIN D2 Take 1 Cap by mouth every Monday and Friday. hyoscyamine SL 0.125 mg SL tablet Commonly known as:  LEVSIN/SL  
1 Tab by SubLINGual route every four (4) hours as needed (upper abdominal pressure / spasm associated with drinking). loratadine 10 mg tablet Commonly known as:  CLARITIN  
TK 1 T PO QD  
  
 melatonin 5 mg Cap capsule Take 5 mg by mouth nightly as needed. MULTIVITAMIN PO Take  by mouth daily. NEBULIZER  
by Does Not Apply route as needed. omeprazole 20 mg capsule Commonly known as:  PRILOSEC Take 40 mg by mouth daily. ondansetron 4 mg disintegrating tablet Commonly known as:  ZOFRAN ODT Take 1 Tab by mouth every eight (8) hours as needed for Nausea. * PROAIR HFA 90 mcg/actuation inhaler Generic drug:  albuterol Take 2 Puffs by inhalation as needed. * albuterol sulfate 2.5 mg/0.5 mL Nebu nebulizer solution Commonly known as:  PROVENTIL;VENTOLIN  
2.5 mg by Nebulization route as needed for Wheezing. VITAMIN B-12 PO Take 3,000 mcg by mouth daily. VITAMIN C PO Take 500 mg by mouth daily. * Notice: This list has 2 medication(s) that are the same as other medications prescribed for you. Read the directions carefully, and ask your doctor or other care provider to review them with you. Patient Instructions Gastroenteritis: Care Instructions Your Care Instructions Gastroenteritis is an illness that may cause nausea, vomiting, and diarrhea. It is sometimes called \"stomach flu. \" It can be caused by bacteria or a virus. You will probably begin to feel better in 1 to 2 days. In the meantime, get plenty of rest and make sure you do not become dehydrated. Dehydration occurs when your body loses too much fluid. Follow-up care is a key part of your treatment and safety. Be sure to make and go to all appointments, and call your doctor if you are having problems. It's also a good idea to know your test results and keep a list of the medicines you take. How can you care for yourself at home? · If your doctor prescribed antibiotics, take them as directed. Do not stop taking them just because you feel better. You need to take the full course of antibiotics. · Drink plenty of fluids to prevent dehydration, enough so that your urine is light yellow or clear like water. Choose water and other caffeine-free clear liquids until you feel better. If you have kidney, heart, or liver disease and have to limit fluids, talk with your doctor before you increase your fluid intake. · Drink fluids slowly, in frequent, small amounts, because drinking too much too fast can cause vomiting. · Begin eating mild foods, such as dry toast, yogurt, applesauce, bananas, and rice. Avoid spicy, hot, or high-fat foods, and do not drink alcohol or caffeine for a day or two. Do not drink milk or eat ice cream until you are feeling better. How to prevent gastroenteritis · Keep hot foods hot and cold foods cold. · Do not eat meats, dressings, salads, or other foods that have been kept at room temperature for more than 2 hours. · Use a thermometer to check your refrigerator. It should be between 34°F and 40°F. 
· Defrost meats in the refrigerator or microwave, not on the kitchen counter. · Keep your hands and your kitchen clean.  Wash your hands, cutting boards, and countertops with hot soapy water frequently. · Cook meat until it is well done. · Do not eat raw eggs or uncooked sauces made with raw eggs. · Do not take chances. If food looks or tastes spoiled, throw it out. When should you call for help? Call 911 anytime you think you may need emergency care. For example, call if: 
  · You vomit blood or what looks like coffee grounds.  
  · You passed out (lost consciousness).  
  · You pass maroon or very bloody stools.  
 Call your doctor now or seek immediate medical care if: 
  · You have severe belly pain.  
  · You have signs of needing more fluids. You have sunken eyes, a dry mouth, and pass only a little dark urine.  
  · You feel like you are going to faint.  
  · You have increased belly pain that does not go away in 1 to 2 days.  
  · You have new or increased nausea, or you are vomiting.  
  · You have a new or higher fever.  
  · Your stools are black and tarlike or have streaks of blood.  
 Watch closely for changes in your health, and be sure to contact your doctor if: 
  · You are dizzy or lightheaded.  
  · You urinate less than usual, or your urine is dark yellow or brown.  
  · You do not feel better with each day that goes by. Where can you learn more? Go to http://yulisa-willian.info/. Enter N142 in the search box to learn more about \"Gastroenteritis: Care Instructions. \" Current as of: November 18, 2017 Content Version: 11.7 © 2367-9511 Healthwise, Incorporated. Care instructions adapted under license by [employer] (which disclaims liability or warranty for this information). If you have questions about a medical condition or this instruction, always ask your healthcare professional. [place] [address] any warranty or liability for your use of this information. Introducing [facility]!    
 [employer] introduces [employer] patient portal. Now you can access parts of your medical record, email your doctor's office, and request medication refills online. 1. In your internet browser, go to [email or web address]. 2. Click on the First Time User? Click Here link in the Sign In box. You will see the New Member Sign Up page. 3. Enter your [employer] Access Code exactly as it appears below. You will not need to use this code after youve completed the sign-up process. If you do not sign up before the expiration date, you must request a new code. · [employer] Access Code: [email or web address] Expires: 9/12/2018  3:16 PM 
 
4. Enter the last four digits of your Social Security Number (xxxx) and Date of Birth (mm/dd/yyyy) as indicated and click Submit. You will be taken to the next sign-up page. 5. Create a [employer] ID. This will be your [employer] login ID and cannot be changed, so think of one that is secure and easy to remember. 6. Create a [employer] password. You can change your password at any time. 7. Enter your Password Reset Question and Answer. This can be used at a later time if you forget your password. 8. Enter your e-mail address. You will receive e-mail notification when new information is available in [address] 9. Click Sign Up. You can now view and download portions of your medical record. 10. Click the Download Summary menu link to download a portable copy of your medical information. If you have questions, please visit the Frequently Asked Questions section of the [employer] website. Remember, [employer] is NOT to be used for urgent needs. For medical emergencies, dial 911. Now available from your iPhone and Android! Please provide this summary of care documentation to your next provider. Your primary care clinician is listed as Maryana Dang. If you have any questions after today's visit, please call [phone].

## 2018-08-07 NOTE — DISCHARGE INSTRUCTIONS
Viral Infections: Care Instructions  Your Care Instructions    You don't feel well, but it's not clear what's causing it. You may have a viral infection. Viruses cause many illnesses, such as the common cold, influenza, fever, rashes, and the diarrhea, nausea, and vomiting that are often called \"stomach flu. \" You may wonder if antibiotic medicines could make you feel better. But antibiotics only treat infections caused by bacteria. They don't work on viruses. The good news is that viral infections usually aren't serious. Most will go away in a few days without medical treatment. In the meantime, there are a few things you can do to make yourself more comfortable. Follow-up care is a key part of your treatment and safety. Be sure to make and go to all appointments, and call your doctor if you are having problems. It's also a good idea to know your test results and keep a list of the medicines you take. How can you care for yourself at home? · Get plenty of rest if you feel tired. · Take an over-the-counter pain medicine if needed, such as acetaminophen (Tylenol), ibuprofen (Advil, Motrin), or naproxen (Aleve). Read and follow all instructions on the label. · Be careful when taking over-the-counter cold or flu medicines and Tylenol at the same time. Many of these medicines have acetaminophen, which is Tylenol. Read the labels to make sure that you are not taking more than the recommended dose. Too much acetaminophen (Tylenol) can be harmful. · Drink plenty of fluids, enough so that your urine is light yellow or clear like water. If you have kidney, heart, or liver disease and have to limit fluids, talk with your doctor before you increase the amount of fluids you drink. · Stay home from work, school, and other public places while you have a fever. When should you call for help? Call 911 anytime you think you may need emergency care.  For example, call if:    · You have severe trouble breathing.     · You passed out (lost consciousness).    Call your doctor now or seek immediate medical care if:    · You seem to be getting much sicker.     · You have a new or higher fever.     · You have blood in your stools.     · You have new belly pain, or your pain gets worse.     · You have a new rash.    Watch closely for changes in your health, and be sure to contact your doctor if:    · You start to get better and then get worse.     · You do not get better as expected. Where can you learn more? Go to http://yulisa-willian.info/. Enter Y823 in the search box to learn more about \"Viral Infections: Care Instructions. \"  Current as of: November 18, 2017  Content Version: 11.7  © 4205-0118 [employer]. Care instructions adapted under license by [employer] (which disclaims liability or warranty for this information). If you have questions about a medical condition or this instruction, always ask your healthcare professional. [place] [address] any warranty or liability for your use of this information. Gastroenteritis: Care Instructions  Your Care Instructions    Gastroenteritis is an illness that may cause nausea, vomiting, and diarrhea. It is sometimes called \"stomach flu. \" It can be caused by bacteria or a virus. You will probably begin to feel better in 1 to 2 days. In the meantime, get plenty of rest and make sure you do not become dehydrated. Dehydration occurs when your body loses too much fluid. Follow-up care is a key part of your treatment and safety. Be sure to make and go to all appointments, and call your doctor if you are having problems. It's also a good idea to know your test results and keep a list of the medicines you take. How can you care for yourself at home? · If your doctor prescribed antibiotics, take them as directed. Do not stop taking them just because you feel better. You need to take the full course of antibiotics.   · Drink plenty of fluids to prevent dehydration, enough so that your urine is light yellow or clear like water. Choose water and other caffeine-free clear liquids until you feel better. If you have kidney, heart, or liver disease and have to limit fluids, talk with your doctor before you increase your fluid intake. · Drink fluids slowly, in frequent, small amounts, because drinking too much too fast can cause vomiting. · Begin eating mild foods, such as dry toast, yogurt, applesauce, bananas, and rice. Avoid spicy, hot, or high-fat foods, and do not drink alcohol or caffeine for a day or two. Do not drink milk or eat ice cream until you are feeling better. How to prevent gastroenteritis  · Keep hot foods hot and cold foods cold. · Do not eat meats, dressings, salads, or other foods that have been kept at room temperature for more than 2 hours. · Use a thermometer to check your refrigerator. It should be between 34°F and 40°F.  · Defrost meats in the refrigerator or microwave, not on the kitchen counter. · Keep your hands and your kitchen clean. Wash your hands, cutting boards, and countertops with hot soapy water frequently. · Cook meat until it is well done. · Do not eat raw eggs or uncooked sauces made with raw eggs. · Do not take chances. If food looks or tastes spoiled, throw it out. When should you call for help? Call 911 anytime you think you may need emergency care. For example, call if:    · You vomit blood or what looks like coffee grounds.     · You passed out (lost consciousness).     · You pass maroon or very bloody stools.    Call your doctor now or seek immediate medical care if:    · You have severe belly pain.     · You have signs of needing more fluids.  You have sunken eyes, a dry mouth, and pass only a little dark urine.     · You feel like you are going to faint.     · You have increased belly pain that does not go away in 1 to 2 days.     · You have new or increased nausea, or you are vomiting.     · You have a new or higher fever.     · Your stools are black and tarlike or have streaks of blood.    Watch closely for changes in your health, and be sure to contact your doctor if:    · You are dizzy or lightheaded.     · You urinate less than usual, or your urine is dark yellow or brown.     · You do not feel better with each day that goes by. Where can you learn more? Go to http://yulisa-willian.info/. Enter N142 in the search box to learn more about \"Gastroenteritis: Care Instructions. \"  Current as of: November 18, 2017  Content Version: 11.7  © 9379-1504 [employer]. Care instructions adapted under license by [employer] (which disclaims liability or warranty for this information). If you have questions about a medical condition or this instruction, always ask your healthcare professional. [address] any warranty or liability for your use of this information.

## 2018-08-07 NOTE — ED PROVIDER NOTES
EMERGENCY DEPARTMENT HISTORY AND PHYSICAL EXAM      Date: 8/7/2018  Patient Name: Prince Mitchell    History of Presenting Illness     Chief Complaint   Patient presents with    Vomiting    Cough       History Provided By: Patient    HPI: Prince Mitchell, 34 y.o. female with PMHx significant for Asthma, GERD, presents ambulatory to the ED with cc of new onset N/V/D for the past 2 days and new onset cough with associated sore throat and chills for the past 4 days. Her main concern today is her N/V/D and concern for dehydration. She reports eating crab yesterday followed by an episode of non bloody emesis with crab meat in it. Pt is also s/p Gastric Sleeve in April but has been without any complications since. Of note pt does get sick around this time of year but she states the vomiting is new. 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 Pt denies any fever, CP, SOB, HA. Social Hx: - Tobacco (former smoker), + EtOH (occasionally), - illicit drug use (-)    There are no other complaints, changes, or physical findings at this time. PCP: Mer Delgado MD    Current Outpatient Prescriptions   Medication Sig Dispense Refill    ondansetron (ZOFRAN ODT) 4 mg disintegrating tablet Take 1 Tab by mouth every eight (8) hours as needed for Nausea. 10 Tab 0    ergocalciferol (VITAMIN D2) 50,000 unit capsule Take 1 Cap by mouth every Monday and Friday. 24 Cap 2    calcium citrate-vitamin D3 (CITRACAL + D) tablet Take 2 Tabs by mouth daily.  MULTIVITAMIN PO Take  by mouth daily.  ASCORBATE CALCIUM (VITAMIN C PO) Take 500 mg by mouth daily.  CYANOCOBALAMIN, VITAMIN B-12, (VITAMIN B-12 PO) Take 3,000 mcg by mouth daily.  Cholecalciferol, Vitamin D3, 50,000 unit cap Take  by mouth every seven (7) days.       loratadine (CLARITIN) 10 mg tablet TK 1 T PO QD  1    hyoscyamine SL (LEVSIN/SL) 0.125 mg SL tablet 1 Tab by SubLINGual route every four (4) hours as needed (upper abdominal pressure / spasm associated with drinking). 30 Tab 1    albuterol sulfate (PROVENTIL;VENTOLIN) 2.5 mg/0.5 mL nebu nebulizer solution 2.5 mg by Nebulization route as needed for Wheezing.  melatonin 5 mg cap capsule Take 5 mg by mouth nightly as needed.  albuterol (PROAIR HFA) 90 mcg/actuation inhaler Take 2 Puffs by inhalation as needed.  NEBULIZER by Does Not Apply route as needed.  omeprazole (PRILOSEC) 20 mg capsule Take 40 mg by mouth daily.          Past History     Past Medical History:  Past Medical History:   Diagnosis Date    Asthma     GERD (gastroesophageal reflux disease)     Hx gestational diabetes     Morbid obesity due to excess calories ([facility] [address] 2016    S/P laparoscopic sleeve gastrectomy 2018    robotic assistedJony    Sleep apnea     NO LONGER USES CPAP    Trouble in sleeping        Past Surgical History:  Past Surgical History:   Procedure Laterality Date    COLONOSCOPY N/A 2017    COLONOSCOPY performed by Deana Law MD at [address] [place] [address]  2017         HX GASTRECTOMY  2018    robotic-assisted sleeve gastrectomy, [place]    HX GI      COLONOSCOPY    HX GYN  1998        UPPER GI ENDOSCOPY,BIOPSY  2017            Family History:  Family History   Problem Relation Age of Onset    Cancer Mother      LIVER    Diabetes Mother     Hypertension Mother     Pneumonia Sister     Alcohol abuse Brother     Liver Disease Brother    Silva Austin Sister     Hypertension Sister     Heart Attack Father     Anesth Problems Neg Hx        Social History:  Social History   Substance Use Topics    Smoking status: Former Smoker     Packs/day: 0.25     Years: 10.00     Quit date: 2012    Smokeless tobacco: Never Used    Alcohol use Yes      Comment: OCCASIONALLY       Allergies:  No Known Allergies      Review of Systems   Review of Systems Constitutional: Positive for chills. Negative for fever. HENT: Positive for sore throat. Negative for congestion, rhinorrhea and sneezing. Respiratory: Positive for cough. Negative for shortness of breath. Cardiovascular: Negative for chest pain. Gastrointestinal: Positive for diarrhea, nausea and vomiting. Negative for abdominal pain. Musculoskeletal: Negative for back pain, myalgias and neck stiffness. Skin: Negative for rash. Neurological: Negative for dizziness, weakness and headaches. All other systems reviewed and are negative. Physical Exam   Physical Exam   Constitutional: She is oriented to person, place, and time. She appears well-developed and well-nourished. HENT:   Head: Normocephalic and atraumatic. Mouth/Throat: Oropharynx is clear and moist.   Eyes: Conjunctivae and EOM are normal.   Neck: Normal range of motion and full passive range of motion without pain. Neck supple. Cardiovascular: Normal rate, regular rhythm, S1 normal, S2 normal, normal heart sounds, intact distal pulses and normal pulses. No murmur heard. Pulmonary/Chest: Effort normal and breath sounds normal. No respiratory distress. She has no wheezes. Abdominal: Soft. Normal appearance and bowel sounds are normal. She exhibits no distension. There is no tenderness. There is no rebound. Musculoskeletal: Normal range of motion. Neurological: She is alert and oriented to person, place, and time. She has normal strength. Skin: Skin is warm, dry and intact. No rash noted. Psychiatric: She has a normal mood and affect. Her speech is normal and behavior is normal. Judgment and thought content normal.   Nursing note and vitals reviewed. Medical Decision Making   I am the first provider for this patient. I reviewed the vital signs, available nursing notes, past medical history, past surgical history, family history and social history. Vital Signs-Reviewed the patient's vital signs.   Patient Vitals for the past 12 hrs:   Temp Pulse Resp BP SpO2   08/07/18 0922 - (!) 57 - 107/62 -   08/07/18 0706 98.1 °F (36.7 °C) 73 16 121/61 98 %     Records Reviewed: Nursing Notes, Old Medical Records, Previous Radiology Studies and Previous Laboratory Studies    Provider Notes (Medical Decision Making):   DDx: Viral illness, URI, Gastroenteritis. ED Course:   Initial assessment performed. The patients presenting problems have been discussed, and they are in agreement with the care plan formulated and outlined with them. I have encouraged them to ask questions as they arise throughout their visit. Critical Care Time:   None. Disposition:  DISCHARGE NOTE  9:40 AM  The patient has been re-evaluated and is ready for discharge. Reviewed available results with patient. Counseled pt on diagnosis and care plan. Pt has expressed understanding, and all questions have been answered. Pt agrees with plan and agrees to F/U as recommended, or return to the ED if their sxs worsen. Discharge instructions have been provided and explained to the pt, along with reasons to return to the ED. PLAN:  1. Current Discharge Medication List      START taking these medications    Details   ondansetron (ZOFRAN ODT) 4 mg disintegrating tablet Take 1 Tab by mouth every eight (8) hours as needed for Nausea. Qty: 10 Tab, Refills: 0           2. Follow-up Information     Follow up With Details Comments Contact Info    Alejandro Waldrop NP Schedule an appointment as soon as possible for a visit  [address] [place] [id number] [phone]      [facility] EMERGENCY DEPT  As needed, If symptoms worsen [address] [facility]  [phone]        Return to ED if worse     Diagnosis     Clinical Impression:   1. Viral illness    2. Gastroenteritis, acute        Attestations:     This note is prepared by Tammie Leahy acting as Scribe for MD Kamari Alfaro MD : The scribe's documentation has been prepared under my direction and personally reviewed by me in its entirety. I confirm that the note above accurately reflects all work, treatment, procedures, and medical decision making performed by me.

## 2018-08-07 NOTE — ED NOTES
Emergency Department Nursing Plan of Care       The Nursing Plan of Care is developed from the Nursing assessment and Emergency Department Attending provider initial evaluation. The plan of care may be reviewed in the ED Provider note.     The Plan of Care was developed with the following considerations:   Patient / Family readiness to learn indicated by:verbalized understanding  Persons(s) to be included in education: patient  Barriers to Learning/Limitations:No    [address] [facility]    8/7/2018   7:03 AM

## 2018-08-07 NOTE — PROGRESS NOTES
Subjective:      Jamal Pedro is a 34 y.o. female presents for postop care following Sleeve gastrectomy 4/18/2018. She has had a cold for the past 4 days. She states that 2 days ago she started vomiting and diarrhea. She was seen today at [facility] and given Zofran to help with nausea. Before being discharged, she was able to tolerate water. + voiding. She has not vomited since this morning. Patient has an advanced directive:  Not on file    Ms. Braden Minor has a reminder for a \"due or due soon\" health maintenance. I have asked that she contact her primary care provider for follow-up on this health maintenance. Objective:     Visit Vitals    /90 (BP 1 Location: Right arm, BP Patient Position: Sitting)    Pulse 64    Temp 98.3 °F (36.8 °C) (Oral)    Resp 18    Ht 5' 7\" (1.702 m)    Wt 216 lb (98 kg)    SpO2 98%    BMI 33.83 kg/m2       General:  alert, cooperative, no distress   Abdomen: soft, bowel sounds active, non-tender         Assessment:     Nausea/vomiting/diarrhea related to Viral gastroenteritis. Plan:     1. Continue Zofran  2. Focus on Liquids for the next 2 days. Drink G2, Propel zero, or powerade zero  3. Follow up 4 weeks. Call if symptom persist.   Pt verbalized understanding and questions were answered to the best of my knowledge and ability.

## 2018-08-07 NOTE — LETTER
NOTIFICATION RETURN TO WORK / SCHOOL 
 
8/7/2018 12:15 PM 
 
Ms. Baez Patient [address] [facility] [phone] [id number] To Whom It May Concern: 
 
Nestor Patient is currently under the care of [facility]. She will return to work on 8/8/2018. If there are questions or concerns please have the patient contact our office. Sincerely, Axel Purcell NP

## 2018-08-07 NOTE — LETTER
NOTIFICATION RETURN TO WORK / SCHOOL 
 
8/7/2018 12:15 PM 
 
Ms. Julissa Albert [address] [place] [id number] To Whom It May Concern: 
 
Julissa Albert is currently under the care of [facility]. She will return to work on 8/9/2018. If there are questions or concerns please have the patient contact our office. Sincerely, Pool Has, NP

## 2018-08-07 NOTE — PROGRESS NOTES
1. Have you been to the ER, urgent care clinic since your last visit? Hospitalized since your last visit? [facility] 8/7/18    2. Have you seen or consulted any other health care providers outside of the [address] [place] [address] since your last visit? Include any pap smears or colon screening. No      Patient has urinated since leaving ED.

## 2018-08-07 NOTE — ED TRIAGE NOTES
C/o sore throat and cough x 4 days with n/v/diarrhea/headache/decreased appetite x 2 days, PMH of gastric sleeve in April

## 2018-08-07 NOTE — LETTER
[address] 
[facility] EMERGENCY DEPT 
[address] [place] [address] 
[phone] Work/School Note Date: 8/7/2018 To Whom It May concern: 
 
Mary Torre was seen and treated today in the emergency room by the following provider(s): 
Attending Provider: Ajith Peter MD.   
 
Mary Torre may return to work on 08/08/2018.  
 
Sincerely, 
 
 
 
 
Ajith Peter MD

## 2018-08-07 NOTE — ED NOTES
Pt arrives in the ED with complaints of cold symptoms x 4 days and n/v/d starting 2 days ago. Denies blood in stool. Reports history of gastric sleeve in April 2018. Pt reports eating crab yesterday and vomiting began after that.

## 2018-08-07 NOTE — ED NOTES
Pt unable to void prior to discharge. .. Isaac Joseph Discharge summary and discharge medications reviewed with patient and appropriate educational materials and side effects teaching were provided. patient  Given 0 paper prescriptions and 1 electronic prescriptions sent to pt's listed pharmacy. Patient (s) verbalized understanding of the importance of discussing medications with his or her physician or clinic they will be following up with. No si/s of acute distress prior to discharge. Patient offered wheelchair from treatment area to hospital entrance, patient refused wheelchair. Given work excuse note. Denied pain. Steady gait.

## 2018-08-07 NOTE — PATIENT INSTRUCTIONS
Gastroenteritis: Care Instructions  Your Care Instructions    Gastroenteritis is an illness that may cause nausea, vomiting, and diarrhea. It is sometimes called \"stomach flu. \" It can be caused by bacteria or a virus. You will probably begin to feel better in 1 to 2 days. In the meantime, get plenty of rest and make sure you do not become dehydrated. Dehydration occurs when your body loses too much fluid. Follow-up care is a key part of your treatment and safety. Be sure to make and go to all appointments, and call your doctor if you are having problems. It's also a good idea to know your test results and keep a list of the medicines you take. How can you care for yourself at home? · If your doctor prescribed antibiotics, take them as directed. Do not stop taking them just because you feel better. You need to take the full course of antibiotics. · Drink plenty of fluids to prevent dehydration, enough so that your urine is light yellow or clear like water. Choose water and other caffeine-free clear liquids until you feel better. If you have kidney, heart, or liver disease and have to limit fluids, talk with your doctor before you increase your fluid intake. · Drink fluids slowly, in frequent, small amounts, because drinking too much too fast can cause vomiting. · Begin eating mild foods, such as dry toast, yogurt, applesauce, bananas, and rice. Avoid spicy, hot, or high-fat foods, and do not drink alcohol or caffeine for a day or two. Do not drink milk or eat ice cream until you are feeling better. How to prevent gastroenteritis  · Keep hot foods hot and cold foods cold. · Do not eat meats, dressings, salads, or other foods that have been kept at room temperature for more than 2 hours. · Use a thermometer to check your refrigerator. It should be between 34°F and 40°F.  · Defrost meats in the refrigerator or microwave, not on the kitchen counter. · Keep your hands and your kitchen clean.  Wash your hands, cutting boards, and countertops with hot soapy water frequently. · Cook meat until it is well done. · Do not eat raw eggs or uncooked sauces made with raw eggs. · Do not take chances. If food looks or tastes spoiled, throw it out. When should you call for help? Call 911 anytime you think you may need emergency care. For example, call if:    · You vomit blood or what looks like coffee grounds.     · You passed out (lost consciousness).     · You pass maroon or very bloody stools.    Call your doctor now or seek immediate medical care if:    · You have severe belly pain.     · You have signs of needing more fluids. You have sunken eyes, a dry mouth, and pass only a little dark urine.     · You feel like you are going to faint.     · You have increased belly pain that does not go away in 1 to 2 days.     · You have new or increased nausea, or you are vomiting.     · You have a new or higher fever.     · Your stools are black and tarlike or have streaks of blood.    Watch closely for changes in your health, and be sure to contact your doctor if:    · You are dizzy or lightheaded.     · You urinate less than usual, or your urine is dark yellow or brown.     · You do not feel better with each day that goes by. Where can you learn more? Go to http://yulisa-willian.info/. Enter N142 in the search box to learn more about \"Gastroenteritis: Care Instructions. \"  Current as of: November 18, 2017  Content Version: 11.7  © 7554-2591 [employer]. Care instructions adapted under license by [employer] (which disclaims liability or warranty for this information). If you have questions about a medical condition or this instruction, always ask your healthcare professional. [place] [address] any warranty or liability for your use of this information.

## 2018-08-08 NOTE — PROGRESS NOTES
Spiritual Care Partner Volunteer visited patient in ED on 8/7/18. Documented on 8/8/18 by:  Carisa Wheeler M.Div.    Paging Service [phone])

## 2018-09-06 ENCOUNTER — OFFICE VISIT (OUTPATIENT)
Dept: SURGERY | Age: 38
End: 2018-09-06

## 2018-09-06 VITALS
WEIGHT: 213.5 LBS | BODY MASS INDEX: 33.51 KG/M2 | OXYGEN SATURATION: 98 % | HEART RATE: 89 BPM | RESPIRATION RATE: 18 BRPM | SYSTOLIC BLOOD PRESSURE: 108 MMHG | DIASTOLIC BLOOD PRESSURE: 66 MMHG | HEIGHT: 67 IN

## 2018-09-06 DIAGNOSIS — Z98.84 S/P LAPAROSCOPIC SLEEVE GASTRECTOMY: ICD-10-CM

## 2018-09-06 DIAGNOSIS — D64.9 ANEMIA, UNSPECIFIED TYPE: ICD-10-CM

## 2018-09-06 DIAGNOSIS — E66.01 MORBID OBESITY DUE TO EXCESS CALORIES (HCC): Primary | ICD-10-CM

## 2018-09-06 DIAGNOSIS — E55.9 VITAMIN D DEFICIENCY: ICD-10-CM

## 2018-09-06 DIAGNOSIS — E53.8 VITAMIN B12 DEFICIENCY: ICD-10-CM

## 2018-09-06 RX ORDER — HYDROXYZINE 25 MG/1
TABLET, FILM COATED ORAL
Refills: 1 | COMMUNITY
Start: 2018-06-28

## 2018-09-06 RX ORDER — ESCITALOPRAM OXALATE 20 MG/1
20 TABLET ORAL DAILY
COMMUNITY
Start: 2018-08-27

## 2018-09-06 NOTE — PROGRESS NOTES
Follow up for lap sleeve gastrectomy on 4/18/18. 1. Have you been to the ER, urgent care clinic since your last visit? Hospitalized since your last visit? Yes[id number]V    2. Have you seen or consulted any other health care providers outside of the [address] [place] [address] since your last visit? Include any pap smears or colon screening.  No      Weight loss since surgery:  60.5 lbs    Weight loss since last OV:  2.5lbs

## 2018-09-06 NOTE — MR AVS SNAPSHOT
[address] [place] [address] [address] [id number] 
[phone] Patient: Marlena Murillo MRN: [id number] :1980 Visit Information Date & Time Provider Department Dept. Phone Encounter #  
 2018 10:40 AM Adarsh Matute NP [facility] [phone] [id number] Your Appointments 2018 10:00 AM  
ESTABLISHED PATIENT with Adarsh Matute NP  
[facility] 22 2 ([address]) Appt Note: 3mos per NP. ID&Ins.cards. aware of bal.calling. #.  
 [address] [facility] [address] [facility] [id number]  
[facility] Upcoming Health Maintenance Date Due Pneumococcal 19-64 Medium Risk (1 of 1 - PPSV23) 1997 PAP AKA CERVICAL CYTOLOGY 1999 Influenza Age 5 to Adult 2018 DTaP/Tdap/Td series (2 - Td) 3/12/2027 Allergies as of 2018  Review Complete On: 2018 By: Adarsh Matute NP No Known Allergies Current Immunizations  Reviewed on 2018 Name Date Tdap 3/12/2017  5:49 PM  
  
 Not reviewed this visit You Were Diagnosed With   
  
 Codes Comments Morbid obesity due to excess calories ([facility] [address]    -  Primary ICD-10-CM: E66.01 
ICD-9-CM: 278.01   
 BMI 33.0-33.9,adult     ICD-10-CM: D13.47 
ICD-9-CM: V85.33 Vitamin D deficiency     ICD-10-CM: E55.9 ICD-9-CM: 268.9 Vitamin B12 deficiency     ICD-10-CM: E53.8 ICD-9-CM: 266.2 Anemia, unspecified type     ICD-10-CM: D64.9 ICD-9-CM: 285.9 S/P laparoscopic sleeve gastrectomy     ICD-10-CM: W40.68 ICD-9-CM: V45.86 Vitals BP Pulse Resp Height(growth percentile) Weight(growth percentile) LMP  
 108/66 (BP 1 Location: Left arm, BP Patient Position: Sitting) 89 18 5' 7\" (1.702 m) 213 lb 8 oz (96.8 kg) 2018 (Exact Date) SpO2 BMI OB Status Smoking Status 98% 33.44 kg/m2 Having regular periods Current Every Day Smoker Vitals History BMI and BSA Data Body Mass Index Body Surface Area  
 33.44 kg/m 2 2.14 m 2 Preferred Pharmacy Pharmacy Name Phone [facility], [address] [place] [phone] Your Updated Medication List  
  
   
This list is accurate as of 9/6/18  2:34 PM.  Always use your most recent med list.  
  
  
  
  
 cholecalciferol 50,000 unit capsule Commonly known as:  VITAMIN D3 Take  by mouth every seven (7) days. Citracal + D tablet Generic drug:  calcium citrate-vitamin D3 Take 2 Tabs by mouth daily. ergocalciferol 50,000 unit capsule Commonly known as:  VITAMIN D2 Take 1 Cap by mouth every Monday and Friday. escitalopram oxalate 20 mg tablet Commonly known as:  Cloria Kohut Take 20 mg by mouth daily. hydrOXYzine HCl 25 mg tablet Commonly known as:  ATARAX TK 1 T PO QID PRA hyoscyamine SL 0.125 mg SL tablet Commonly known as:  LEVSIN/SL  
1 Tab by SubLINGual route every four (4) hours as needed (upper abdominal pressure / spasm associated with drinking). loratadine 10 mg tablet Commonly known as:  CLARITIN  
TK 1 T PO QD  
  
 melatonin 5 mg Cap capsule Take 5 mg by mouth nightly as needed. MULTIVITAMIN PO Take  by mouth daily. NEBULIZER  
by Does Not Apply route as needed. omeprazole 20 mg capsule Commonly known as:  PRILOSEC Take 40 mg by mouth daily. ondansetron 4 mg disintegrating tablet Commonly known as:  ZOFRAN ODT Take 1 Tab by mouth every eight (8) hours as needed for Nausea. * PROAIR HFA 90 mcg/actuation inhaler Generic drug:  albuterol Take 2 Puffs by inhalation as needed. * albuterol sulfate 2.5 mg/0.5 mL Nebu nebulizer solution Commonly known as:  PROVENTIL;VENTOLIN  
2.5 mg by Nebulization route as needed for Wheezing. VITAMIN B-12 PO Take 3,000 mcg by mouth daily. VITAMIN C PO Take 500 mg by mouth daily. * Notice: This list has 2 medication(s) that are the same as other medications prescribed for you. Read the directions carefully, and ask your doctor or other care provider to review them with you. We Performed the Following CBC W/O DIFF [78124 CPT(R)] METABOLIC PANEL, COMPREHENSIVE [67102 CPT(R)] PREALBUMIN [29046 CPT(R)] VITAMIN B12 [id number] CPT(R)] VITAMIN D, 25 HYDROXY [id number] CPT(R)] Patient Instructions Eating Healthy Foods: Care Instructions Your Care Instructions Eating healthy foods can help lower your risk for disease. Healthy food gives you energy and keeps your heart strong, your brain active, your muscles working, and your bones strong. A healthy diet includes a variety of foods from the basic food groups: grains, vegetables, fruits, milk and milk products, and meat and beans. Some people may eat more of their favorite foods from only one food group and, as a result, miss getting the nutrients they need. So, it is important to pay attention not only to what you eat but also to what you are missing from your diet. You can eat a healthy, balanced diet by making a few small changes. Follow-up care is a key part of your treatment and safety. Be sure to make and go to all appointments, and call your doctor if you are having problems. It's also a good idea to know your test results and keep a list of the medicines you take. How can you care for yourself at home? Look at what you eat · Keep a food diary for a week or two and record everything you eat or drink. Track the number of servings you eat from each food group. · For a balanced diet every day, eat a variety of: ¨ 6 or more ounce-equivalents of grains, such as cereals, breads, crackers, rice, or pasta, every day.  An ounce-equivalent is 1 slice of bread, 1 cup of ready-to-eat cereal, or ½ cup of cooked rice, cooked pasta, or cooked cereal. 
¨ 2½ cups of vegetables, especially: § Dark-green vegetables such as broccoli and spinach. § Orange vegetables such as carrots and sweet potatoes. § Dry beans (such as fong and kidney beans) and peas (such as lentils). ¨ 2 cups of fresh, frozen, or canned fruit. A small apple or 1 banana or orange equals 1 cup. ¨ 3 cups of nonfat or low-fat milk, yogurt, or other milk products. ¨ 5½ ounces of meat and beans, such as chicken, fish, lean meat, beans, nuts, and seeds. One egg, 1 tablespoon of peanut butter, ½ ounce nuts or seeds, or ¼ cup of cooked beans equals 1 ounce of meat. · Learn how to read food labels for serving sizes and ingredients. Fast-food and convenience-food meals often contain few or no fruits or vegetables. Make sure you eat some fruits and vegetables to make the meal more nutritious. · Look at your food diary. For each food group, add up what you have eaten and then divide the total by the number of days. This will give you an idea of how much you are eating from each food group. See if you can find some ways to change your diet to make it more healthy. Start small · Do not try to make dramatic changes to your diet all at once. You might feel that you are missing out on your favorite foods and then be more likely to fail. · Start slowly, and gradually change your habits. Try some of the following: ¨ Use whole wheat bread instead of white bread. ¨ Use nonfat or low-fat milk instead of whole milk. ¨ Eat brown rice instead of white rice, and eat whole wheat pasta instead of white-flour pasta. ¨ Try low-fat cheeses and low-fat yogurt. ¨ Add more fruits and vegetables to meals and have them for snacks. ¨ Add lettuce, tomato, cucumber, and onion to sandwiches. ¨ Add fruit to yogurt and cereal. 
Enjoy food · You can still eat your favorite foods.  You just may need to eat less of them. If your favorite foods are high in fat, salt, and sugar, limit how often you eat them, but do not cut them out entirely. · Eat a wide variety of foods. Make healthy choices when eating out · The type of restaurant you choose can help you make healthy choices. Even fast-food chains are now offering more low-fat or healthier choices on the menu. · Choose smaller portions, or take half of your meal home. · When eating out, try: ¨ A veggie pizza with a whole wheat crust or grilled chicken (instead of sausage or pepperoni). ¨ Pasta with roasted vegetables, grilled chicken, or marinara sauce instead of cream sauce. ¨ A vegetable wrap or grilled chicken wrap. ¨ Broiled or poached food instead of fried or breaded items. Make healthy choices easy · Buy packaged, prewashed, ready-to-eat fresh vegetables and fruits, such as baby carrots, salad mixes, and chopped or shredded broccoli and cauliflower. · Buy packaged, presliced fruits, such as melon or pineapple. · Choose 100% fruit or vegetable juice instead of soda. Limit juice intake to 4 to 6 oz (½ to ¾ cup) a day. · Blend low-fat yogurt, fruit juice, and canned or frozen fruit to make a smoothie for breakfast or a snack. Where can you learn more? Go to http://yulisa-willian.info/. Enter T756 in the search box to learn more about \"Eating Healthy Foods: Care Instructions. \" Current as of: May 12, 2017 Content Version: 11.7 © 4729-8481 Healthwise, [employer]orporated. Care instructions adapted under license by [employer] (which disclaims liability or warranty for this information). If you have questions about a medical condition or this instruction, always ask your healthcare professional. [place] [address] any warranty or liability for your use of this information. Introducing [facility] & HE[facility]!    
 [facility] introduces Zipdial patient portal. Now you can access parts of your medical record, email your doctor's office, and request medication refills online. 1. In your internet browser, go to [email or web address] 2. Click on the First Time User? Click Here link in the Sign In box. You will see the New Member Sign Up page. 3. Enter your Bel[employer] Access Code exactly as it appears below. You will not need to use this code after youve completed the sign-up process. If you do not sign up before the expiration date, you must request a new code. · Bel[employer] Access Code: [email or web address] Expires: 9/12/2018  3:16 PM 
 
4. Enter the last four digits of your Social Security Number (xxxx) and Date of Birth (mm/dd/yyyy) as indicated and click Submit. You will be taken to the next sign-up page. 5. Create a Belanit ID. This will be your Belanit login ID and cannot be changed, so think of one that is secure and easy to remember. 6. Create a Belanit password. You can change your password at any time. 7. Enter your Password Reset Question and Answer. This can be used at a later time if you forget your password. 8. Enter your e-mail address. You will receive e-mail notification when new information is available in [address] 9. Click Sign Up. You can now view and download portions of your medical record. 10. Click the Download Summary menu link to download a portable copy of your medical information. If you have questions, please visit the Frequently Asked Questions section of the Bel[employer] website. Remember, [employer]it is NOT to be used for urgent needs. For medical emergencies, dial 911. Now available from your iPhone and Android! Please provide this summary of care documentation to your next provider. Your primary care clinician is listed as Enoch Purcell. If you have any questions after today's visit, please call [phone].

## 2018-09-06 NOTE — PROGRESS NOTES
HISTORY OF PRESENT ILLNESS  Marina Mcqueen is a 34 y.o. female with previous Sleeve gastrectomy surgery on 4/18/2018. [facility] [place] She has lost a total of 60.5 pounds since surgery. She  Has lost 2.5 lbs since the last ov. Body mass index is 33.44 kg/(m^2). . no nausea and no vomiting . + Acid reflux/heartburn, taking prilosec. Drinking  32 ounces of water daily. 60+ protein intake daily. + BM's. Pt is  Walking for exercise. Dietary recall -    Breakfast- skip  Lunch- fish or chicken  Dinner- crab legs, seafood. She is snacking between meals; puff cheese doodles. Vitamins:  MVI : yes  Calcium : yes  B-Vit 12: yes    Patient has an advanced directive:  Not on file. Ms. Ana Orlando has a reminder for a \"due or due soon\" health maintenance. I have asked that she contact her primary care provider for follow-up on this health maintenance. Co-Morbid(s)           COMORBIDITY     SLEEP APNEA                 yes      GERD  (req.meds)           no      Current Outpatient Prescriptions:     escitalopram oxalate (LEXAPRO) 20 mg tablet, Take 20 mg by mouth daily. , Disp: , Rfl:     hydrOXYzine HCl (ATARAX) 25 mg tablet, TK 1 T PO QID PRA, Disp: , Rfl: 1    ondansetron (ZOFRAN ODT) 4 mg disintegrating tablet, Take 1 Tab by mouth every eight (8) hours as needed for Nausea., Disp: 10 Tab, Rfl: 0    loratadine (CLARITIN) 10 mg tablet, TK 1 T PO QD, Disp: , Rfl: 1    hyoscyamine SL (LEVSIN/SL) 0.125 mg SL tablet, 1 Tab by SubLINGual route every four (4) hours as needed (upper abdominal pressure / spasm associated with drinking). , Disp: 30 Tab, Rfl: 1    ergocalciferol (VITAMIN D2) 50,000 unit capsule, Take 1 Cap by mouth every Monday and Friday., Disp: 24 Cap, Rfl: 2    calcium citrate-vitamin D3 (CITRACAL + D) tablet, Take 2 Tabs by mouth daily. , Disp: , Rfl:     albuterol sulfate (PROVENTIL;VENTOLIN) 2.5 mg/0.5 mL nebu nebulizer solution, 2.5 mg by Nebulization route as needed for Wheezing., Disp: , Rfl:     melatonin 5 mg cap capsule, Take 5 mg by mouth nightly as needed. , Disp: , Rfl:     MULTIVITAMIN PO, Take  by mouth daily. , Disp: , Rfl:     albuterol (PROAIR HFA) 90 mcg/actuation inhaler, Take 2 Puffs by inhalation as needed. , Disp: , Rfl:     NEBULIZER, by Does Not Apply route as needed. , Disp: , Rfl:     ASCORBATE CALCIUM (VITAMIN C PO), Take 500 mg by mouth daily. , Disp: , Rfl:     CYANOCOBALAMIN, VITAMIN B-12, (VITAMIN B-12 PO), Take 3,000 mcg by mouth daily. , Disp: , Rfl:     omeprazole (PRILOSEC) 20 mg capsule, Take 40 mg by mouth daily. , Disp: , Rfl:     Cholecalciferol, Vitamin D3, 50,000 unit cap, Take  by mouth every seven (7) days. , Disp: , Rfl:       Visit Vitals    /66 (BP 1 Location: Left arm, BP Patient Position: Sitting)    Pulse 89    Resp 18    Ht 5' 7\" (1.702 m)    Wt 213 lb 8 oz (96.8 kg)    LMP 09/02/2018 (Exact Date)    SpO2 98%    BMI 33.44 kg/m2     HPI    Review of Systems   Constitutional: Negative for chills and fever. Respiratory: Negative for shortness of breath. Cardiovascular: Negative for chest pain. Gastrointestinal: Negative for abdominal pain, heartburn, nausea and vomiting. Neurological: Negative for dizziness and headaches. Physical Exam   Constitutional: She is oriented to person, place, and time. She appears well-developed and well-nourished. Cardiovascular: Normal rate and regular rhythm. Exam reveals no gallop and no friction rub. No murmur heard. Pulmonary/Chest: Effort normal and breath sounds normal.   Abdominal: Soft. Bowel sounds are normal. She exhibits no distension. There is no tenderness. No masses or hernia   Neurological: She is alert and oriented to person, place, and time. Skin: Skin is warm and dry. Psychiatric: She has a normal mood and affect. ASSESSMENT and PLAN  Nestor Patient is a 34 y.o. female with previous Sleeve gastrectomy surgery on 4/18/2018. [facility] She has lost a total of 60.5 pounds since surgery.  She  Has lost 2.5 lbs since the last ov. Body mass index is 33.44 kg/(m^2). Advised patient regard to diet that is high-protein, low-fat, low-sugar, limited carbohydrates. Strive for 50-60 grams of protein daily. If having a snack, foods that are protein or fiber rich. . No eating/drinking together, chew foods well, and portion control. Measure meals. Discussed snacking behavior and to [facility] pay attention to behavioral factor and habits. Drink at least 40-64 ounces of water or non-calorie/non-carbonated beverages daily. Continue vitamin regiment daily. Exercise at least 3 days a week with cardiovascular and strength training. Patient to follow up in 3 months. Advised to call office if any questions/concerns. Continue Prilosec. Check vitamin levels. Pt verbalized understanding and questions were answered to the best of my knowledge and ability.

## 2018-09-06 NOTE — PATIENT INSTRUCTIONS

## 2018-11-12 ENCOUNTER — HOSPITAL ENCOUNTER (EMERGENCY)
Age: 38
Discharge: HOME | End: 2018-11-12
Attending: EMERGENCY MEDICINE
Payer: MEDICAID

## 2018-11-12 VITALS
WEIGHT: 200.2 LBS | BODY MASS INDEX: 30.34 KG/M2 | HEART RATE: 68 BPM | DIASTOLIC BLOOD PRESSURE: 79 MMHG | TEMPERATURE: 97.7 F | RESPIRATION RATE: 15 BRPM | HEIGHT: 68 IN | SYSTOLIC BLOOD PRESSURE: 126 MMHG | OXYGEN SATURATION: 100 %

## 2018-11-12 DIAGNOSIS — M25.511 PAIN OF BOTH SHOULDER JOINTS: ICD-10-CM

## 2018-11-12 DIAGNOSIS — S16.1XXA STRAIN OF NECK MUSCLE, INITIAL ENCOUNTER: ICD-10-CM
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
## 2018-11-12 DIAGNOSIS — M25.512 PAIN OF BOTH SHOULDER JOINTS: ICD-10-CM

## 2018-11-12 PROCEDURE — 99282 EMERGENCY DEPT VISIT SF MDM: CPT

## 2018-11-12 RX ORDER — CYCLOBENZAPRINE HCL 10 MG
10 TABLET ORAL
Qty: 15 TAB | Refills: 0 | Status: SHIPPED | OUTPATIENT
Start: 2018-11-12

## 2018-11-12 RX ORDER — NAPROXEN 500 MG/1
500 TABLET ORAL 2 TIMES DAILY WITH MEALS
Qty: 20 TAB | Refills: 0 | Status: SHIPPED | OUTPATIENT
Start: 2018-11-12 | End: 2018-11-22
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
## 2018-11-12 NOTE — ED NOTES
Pt for DC home. Plan of care accepted by pt. Patient (s)  given copy of dc instructions and 2 script(s). Patient (s)  verbalized understanding of instructions and script (s). Patient given a current medication reconciliation form and verbalized understanding of their medications. Patient (s) verbalized understanding of the importance of discussing medications with  his or her physician or clinic they will be following up with. Patient alert and oriented and in no acute distress. Patient discharged home ambulatory with self.

## 2019-02-18 ENCOUNTER — TELEPHONE (OUTPATIENT)
Dept: SURGERY | Age: 39
End: 2019-02-18

## 2019-04-08 ENCOUNTER — HOSPITAL ENCOUNTER (EMERGENCY)
Age: 39
Discharge: HOME | End: 2019-04-08
Attending: EMERGENCY MEDICINE
Payer: MEDICAID

## 2019-04-08 ENCOUNTER — APPOINTMENT (OUTPATIENT)
Dept: CT IMAGING | Age: 39
End: 2019-04-08
Attending: PHYSICIAN ASSISTANT
Payer: MEDICAID

## 2019-04-08 ENCOUNTER — APPOINTMENT (OUTPATIENT)
Dept: GENERAL RADIOLOGY | Age: 39
End: 2019-04-08
Attending: PHYSICIAN ASSISTANT
Payer: MEDICAID

## 2019-04-08 VITALS
SYSTOLIC BLOOD PRESSURE: 126 MMHG | RESPIRATION RATE: 18 BRPM | OXYGEN SATURATION: 100 % | DIASTOLIC BLOOD PRESSURE: 56 MMHG | WEIGHT: 203.5 LBS | TEMPERATURE: 98.7 F | HEART RATE: 87 BPM | BODY MASS INDEX: 31.4 KG/M2

## 2019-04-08 DIAGNOSIS — R07.89 ATYPICAL CHEST PAIN: Primary | ICD-10-CM

## 2019-04-08 LAB
ALBUMIN SERPL-MCNC: 3.6 G/DL (ref 3.5–5)
ALBUMIN/GLOB SERPL: 1.2 {RATIO} (ref 1.1–2.2)
ALP SERPL-CCNC: 57 U/L (ref 45–117)
ALT SERPL-CCNC: 16 U/L (ref 12–78)
ANION GAP SERPL CALC-SCNC: 6 MMOL/L (ref 5–15)
APPEARANCE UR: CLEAR
AST SERPL-CCNC: 12 U/L (ref 15–37)
BACTERIA URNS QL MICRO: NEGATIVE /HPF
BASOPHILS # BLD: 0.1 K/UL (ref 0–0.1)
BASOPHILS NFR BLD: 1 % (ref 0–1)
BILIRUB SERPL-MCNC: 0.4 MG/DL (ref 0.2–1)
BILIRUB UR QL: NEGATIVE
BUN SERPL-MCNC: 10 MG/DL (ref 6–20)
BUN/CREAT SERPL: 13 (ref 12–20)
CALCIUM SERPL-MCNC: 8.9 MG/DL (ref 8.5–10.1)
CHLORIDE SERPL-SCNC: 103 MMOL/L (ref 97–108)
CK SERPL-CCNC: 72 U/L (ref 26–192)
CO2 SERPL-SCNC: 30 MMOL/L (ref 21–32)
COLOR UR: ABNORMAL
CREAT SERPL-MCNC: 0.8 MG/DL (ref 0.55–1.02)
DIFFERENTIAL METHOD BLD: ABNORMAL
EOSINOPHIL # BLD: 0.3 K/UL (ref 0–0.4)
EOSINOPHIL NFR BLD: 6 % (ref 0–7)
EPITH CASTS URNS QL MICRO: ABNORMAL /LPF
ERYTHROCYTE [DISTWIDTH] IN BLOOD BY AUTOMATED COUNT: 12 % (ref 11.5–14.5)
GLOBULIN SER CALC-MCNC: 3.1 G/DL (ref 2–4)
GLUCOSE SERPL-MCNC: 81 MG/DL (ref 65–100)
GLUCOSE UR STRIP.AUTO-MCNC: NEGATIVE MG/DL
HCG UR QL: NEGATIVE
HCT VFR BLD AUTO: 33.1 % (ref 35–47)
HGB BLD-MCNC: 11.1 G/DL (ref 11.5–16)
HGB UR QL STRIP: NEGATIVE
IMM GRANULOCYTES # BLD AUTO: 0 K/UL (ref 0–0.04)
IMM GRANULOCYTES NFR BLD AUTO: 0 % (ref 0–0.5)
KETONES UR QL STRIP.AUTO: NEGATIVE MG/DL
LEUKOCYTE ESTERASE UR QL STRIP.AUTO: NEGATIVE
LYMPHOCYTES # BLD: 2.5 K/UL (ref 0.8–3.5)
LYMPHOCYTES NFR BLD: 49 % (ref 12–49)
MCH RBC QN AUTO: 33 PG (ref 26–34)
MCHC RBC AUTO-ENTMCNC: 33.5 G/DL (ref 30–36.5)
MCV RBC AUTO: 98.5 FL (ref 80–99)
MONOCYTES # BLD: 0.6 K/UL (ref 0–1)
MONOCYTES NFR BLD: 11 % (ref 5–13)
MUCOUS THREADS URNS QL MICRO: ABNORMAL /LPF
NEUTS SEG # BLD: 1.7 K/UL (ref 1.8–8)
NEUTS SEG NFR BLD: 33 % (ref 32–75)
NITRITE UR QL STRIP.AUTO: NEGATIVE
NRBC # BLD: 0 K/UL (ref 0–0.01)
NRBC BLD-RTO: 0 PER 100 WBC
PH UR STRIP: 6.5 [PH] (ref 5–8)
PLATELET # BLD AUTO: 199 K/UL (ref 150–400)
PMV BLD AUTO: 10.4 FL (ref 8.9–12.9)
POTASSIUM SERPL-SCNC: 3.5 MMOL/L (ref 3.5–5.1)
PROT SERPL-MCNC: 6.7 G/DL (ref 6.4–8.2)
PROT UR STRIP-MCNC: NEGATIVE MG/DL
RBC # BLD AUTO: 3.36 M/UL (ref 3.8–5.2)
RBC #/AREA URNS HPF: ABNORMAL /HPF (ref 0–5)
SODIUM SERPL-SCNC: 139 MMOL/L (ref 136–145)
SP GR UR REFRACTOMETRY: 1.02 (ref 1–1.03)
TROPONIN I SERPL-MCNC: <0.05 NG/ML
UA: UC IF INDICATED,UAUC: ABNORMAL
UROBILINOGEN UR QL STRIP.AUTO: 1 EU/DL (ref 0.2–1)
WBC # BLD AUTO: 5.1 K/UL (ref 3.6–11)
WBC URNS QL MICRO: ABNORMAL /HPF (ref 0–4)

## 2019-04-08 PROCEDURE — 71275 CT ANGIOGRAPHY CHEST: CPT

## 2019-04-08 PROCEDURE — 82550 ASSAY OF CK (CPK): CPT

## 2019-04-08 PROCEDURE — 99284 EMERGENCY DEPT VISIT MOD MDM: CPT

## 2019-04-08 PROCEDURE — 93005 ELECTROCARDIOGRAM TRACING: CPT

## 2019-04-08 PROCEDURE — 81001 URINALYSIS AUTO W/SCOPE: CPT

## 2019-04-08 PROCEDURE — 85025 COMPLETE CBC W/AUTO DIFF WBC: CPT

## 2019-04-08 PROCEDURE — 80053 COMPREHEN METABOLIC PANEL: CPT

## 2019-04-08 PROCEDURE — 71046 X-RAY EXAM CHEST 2 VIEWS: CPT

## 2019-04-08 PROCEDURE — 36415 COLL VENOUS BLD VENIPUNCTURE: CPT

## 2019-04-08 PROCEDURE — 81025 URINE PREGNANCY TEST: CPT

## 2019-04-08 PROCEDURE — 74011636320 HC RX REV CODE- 636/320: Performed by: EMERGENCY MEDICINE

## 2019-04-08 PROCEDURE — 84484 ASSAY OF TROPONIN QUANT: CPT

## 2019-04-08 RX ORDER — SODIUM CHLORIDE 0.9 % (FLUSH) 0.9 %
5-10 SYRINGE (ML) INJECTION
Status: COMPLETED | OUTPATIENT
Start: 2019-04-08 | End: 2019-04-08

## 2019-04-08 RX ADMIN — Medication 10 ML: at 16:17

## 2019-04-08 RX ADMIN — IOPAMIDOL 100 ML: 755 INJECTION, SOLUTION INTRAVENOUS at 16:17

## 2019-04-08 NOTE — ED NOTES
..Discharge summary and discharge medications reviewed with patient and appropriate educational materials and side effects teaching were provided. patient  Given 0 paper prescriptions and 0 electronic prescriptions sent to pt's listed pharmacy. Patient  verbalized understanding of the importance of discussing medications with his or her physician or clinic they will be following up with. No si/s of acute distress prior to discharge. Patient offered wheelchair from treatment area to hospital entrance, patient declined wheelchair. Discharged by provider.

## 2019-04-08 NOTE — ED PROVIDER NOTES
EMERGENCY DEPARTMENT HISTORY AND PHYSICAL EXAM 
 
 
Date: 2019 Patient Name: Marlena Murillo History of Presenting Illness Chief Complaint Patient presents with  Back Pain  Chest Pain History Provided By: Patient HPI: Marlena Murillo, 37 y.o. female with PMHx  for obesity, GERD, asthma,, presents to the ED with cc of sharp chest pain that shooting to her back that has been ongoing for a week. Patient states that the symptoms are intermittent and come at random. She has not followed up with her primary care doctor or his seen in the ER for the symptoms. There are no other complaints, changes, or physical findings at this time. Per patient the pain in her chest and back travels to all of her joints goes from her ankle to her knee to her elbow at random times. The pain patient denies any shortness of breath currently but states that when she twist and turns her she has shortness of breath. Patient denies any dizziness, lightheadedness,  fevers, chills, nausea, vomiting, chest pain, shortness of breath, headache, rash, diarrhea, sweating or weight loss patient, long distance travels by plane or car, recent surgeries or fractures, history of smoking tobacco products, ocp usage. All other ROS negative at this time Pt is in no acute distress and is speaking in full sentences Social History Tobacco Use  Smoking status: Current Every Day Smoker Packs/day: 0.25 Years: 10.00 Pack years: 2.50 Types: Cigars Last attempt to quit: 2012 Years since quittin.9  Smokeless tobacco: Never Used  Tobacco comment: Black & Zohra Substance Use Topics  Alcohol use: Yes Comment: OCCASIONALLY  Drug use: No  
 
 
No Known Allergies PCP: Harless Gottron, MD 
 
No current facility-administered medications on file prior to encounter. Current Outpatient Medications on File Prior to Encounter Medication Sig Dispense Refill  loratadine (CLARITIN) 10 mg tablet TK 1 T PO QD  1  
 omeprazole (PRILOSEC) 20 mg capsule Take 40 mg by mouth daily.  cyclobenzaprine (FLEXERIL) 10 mg tablet Take 1 Tab by mouth three (3) times daily as needed for Muscle Spasm(s). 15 Tab 0  
 escitalopram oxalate (LEXAPRO) 20 mg tablet Take 20 mg by mouth daily.  hydrOXYzine HCl (ATARAX) 25 mg tablet TK 1 T PO QID PRA  1  
 ondansetron (ZOFRAN ODT) 4 mg disintegrating tablet Take 1 Tab by mouth every eight (8) hours as needed for Nausea. 10 Tab 0  
 hyoscyamine SL (LEVSIN/SL) 0.125 mg SL tablet 1 Tab by SubLINGual route every four (4) hours as needed (upper abdominal pressure / spasm associated with drinking). 30 Tab 1  
 ergocalciferol (VITAMIN D2) 50,000 unit capsule Take 1 Cap by mouth every Monday and Friday. 24 Cap 2  
 calcium citrate-vitamin D3 (CITRACAL + D) tablet Take 2 Tabs by mouth daily.  albuterol sulfate (PROVENTIL;VENTOLIN) 2.5 mg/0.5 mL nebu nebulizer solution 2.5 mg by Nebulization route as needed for Wheezing.  melatonin 5 mg cap capsule Take 5 mg by mouth nightly as needed.  MULTIVITAMIN PO Take  by mouth daily.  albuterol (PROAIR HFA) 90 mcg/actuation inhaler Take 2 Puffs by inhalation as needed.  NEBULIZER by Does Not Apply route as needed.  ASCORBATE CALCIUM (VITAMIN C PO) Take 500 mg by mouth daily.  CYANOCOBALAMIN, VITAMIN B-12, (VITAMIN B-12 PO) Take 3,000 mcg by mouth daily.  Cholecalciferol, Vitamin D3, 50,000 unit cap Take  by mouth every seven (7) days. Past History Past Medical History: 
Past Medical History:  
Diagnosis Date  Asthma  GERD (gastroesophageal reflux disease)  Hx gestational diabetes  Morbid obesity due to excess calories ([facility] [address] 11/17/2016  S/P laparoscopic sleeve gastrectomy 04/18/2018  
 robotic assisted, [facility]  Sleep apnea NO LONGER USES CPAP  Trouble in sleeping Past Surgical History: 
Past Surgical History: Procedure Laterality Date  COLONOSCOPY N/A 2017 COLONOSCOPY performed by Sindy Oliva MD at [facility] ENDOSCOPY  COLONOSCOPY,DIAGNOSTIC  2017  HX GASTRECTOMY  2018  
 robotic-assisted sleeve gastrectomy, [facility]  HX GI    
 COLONOSCOPY  
 [facility]  
 UPPER GI ENDOSCOPY,BIOPSY  2017 Family History: 
Family History Problem Relation Age of Onset  Cancer Mother LIVER  
 Diabetes Mother  Hypertension Mother  Pneumonia Sister  Alcohol abuse Brother  Liver Disease Brother  Arthritis-osteo Sister  Hypertension Sister  Heart Attack Father  Anesth Problems Neg Hx Social History: 
Social History Tobacco Use  Smoking status: Current Every Day Smoker Packs/day: 0.25 Years: 10.00 Pack years: 2.50 Types: Cigars Last attempt to quit: 2012 Years since quittin.9  Smokeless tobacco: Never Used  Tobacco comment: Black & Milds Substance Use Topics  Alcohol use: Yes Comment: OCCASIONALLY  Drug use: No  
 
 
Allergies: 
No Known Allergies Review of Systems Review of Systems Constitutional: Negative. Negative for chills and fever. HENT: Negative. Eyes: Negative. Respiratory: Negative. Negative for cough, shortness of breath, wheezing and stridor. Cardiovascular: Positive for chest pain. Gastrointestinal: Negative. Negative for abdominal pain, diarrhea, nausea and vomiting. Endocrine: Negative. Genitourinary: Negative. Musculoskeletal: Negative. Skin: Negative. Allergic/Immunologic: Negative. Neurological: Negative. Negative for headaches. Hematological: Negative. Psychiatric/Behavioral: Negative. All other systems reviewed and are negative. Physical Exam  
Physical Exam  
Constitutional: She is oriented to person, place, and time. She appears well-developed and well-nourished. No distress. Nondiaphoretic. HENT:  
Head: Normocephalic and atraumatic. Right Ear: External ear normal.  
Left Ear: External ear normal.  
Nose: Nose normal.  
Mouth/Throat: Oropharynx is clear and moist.  
Eyes: Pupils are equal, round, and reactive to light. Conjunctivae and EOM are normal.  
Neck: Normal range of motion. Neck supple. No tracheal deviation present. Cardiovascular: Normal rate, regular rhythm, normal heart sounds and intact distal pulses. Pulmonary/Chest: Effort normal and breath sounds normal. No respiratory distress. She has no wheezes. She has no rales. She exhibits no tenderness. Abdominal: Soft. Bowel sounds are normal. She exhibits no distension. There is no tenderness. There is no rebound, no CVA tenderness, no tenderness at McBurney's point and negative Lo's sign. Musculoskeletal: Normal range of motion. She exhibits no edema, tenderness or deformity. Lymphadenopathy:  
  She has no cervical adenopathy. Neurological: She is alert and oriented to person, place, and time. She has normal reflexes. She displays normal reflexes. No cranial nerve deficit. She exhibits normal muscle tone. Coordination normal.  
Skin: Skin is warm and dry. She is not diaphoretic. No pallor. Psychiatric: She has a normal mood and affect. Her behavior is normal. Judgment and thought content normal.  
Nursing note and vitals reviewed. Diagnostic Study Results Labs - Recent Results (from the past 12 hour(s)) EKG, 12 LEAD, INITIAL Collection Time: 04/08/19  3:14 PM  
Result Value Ref Range Ventricular Rate 66 BPM  
 Atrial Rate 66 BPM  
 P-R Interval 156 ms QRS Duration 80 ms  
 Q-T Interval 408 ms QTC Calculation (Bezet) 427 ms Calculated P Axis 5 degrees Calculated R Axis 61 degrees Calculated T Axis 50 degrees Diagnosis Normal sinus rhythm Normal ECG When compared with ECG of 05-OCT-2016 13:59, No significant change was found CBC WITH AUTOMATED DIFF  
 Collection Time: 04/08/19  3:19 PM  
Result Value Ref Range WBC 5.1 3.6 - 11.0 K/uL  
 RBC 3.36 (L) 3.80 - 5.20 M/uL  
 HGB 11.1 (L) 11.5 - 16.0 g/dL HCT 33.1 (L) 35.0 - 47.0 % MCV 98.5 80.0 - 99.0 FL  
 MCH 33.0 26.0 - 34.0 PG  
 MCHC 33.5 30.0 - 36.5 g/dL  
 RDW 12.0 11.5 - 14.5 % PLATELET 721 654 - 908 K/uL MPV 10.4 8.9 - 12.9 FL  
 NRBC 0.0 0  WBC ABSOLUTE NRBC 0.00 0.00 - 0.01 K/uL NEUTROPHILS 33 32 - 75 % LYMPHOCYTES 49 12 - 49 % MONOCYTES 11 5 - 13 % EOSINOPHILS 6 0 - 7 % BASOPHILS 1 0 - 1 % IMMATURE GRANULOCYTES 0 0.0 - 0.5 % ABS. NEUTROPHILS 1.7 (L) 1.8 - 8.0 K/UL  
 ABS. LYMPHOCYTES 2.5 0.8 - 3.5 K/UL  
 ABS. MONOCYTES 0.6 0.0 - 1.0 K/UL  
 ABS. EOSINOPHILS 0.3 0.0 - 0.4 K/UL  
 ABS. BASOPHILS 0.1 0.0 - 0.1 K/UL  
 ABS. IMM. GRANS. 0.0 0.00 - 0.04 K/UL  
 DF AUTOMATED METABOLIC PANEL, COMPREHENSIVE Collection Time: 04/08/19  3:19 PM  
Result Value Ref Range Sodium 139 136 - 145 mmol/L Potassium 3.5 3.5 - 5.1 mmol/L Chloride 103 97 - 108 mmol/L  
 CO2 30 21 - 32 mmol/L Anion gap 6 5 - 15 mmol/L Glucose 81 65 - 100 mg/dL BUN 10 6 - 20 MG/DL Creatinine 0.80 0.55 - 1.02 MG/DL  
 BUN/Creatinine ratio 13 12 - 20 GFR est AA >60 >60 ml/min/1.73m2 GFR est non-AA >60 >60 ml/min/1.73m2 Calcium 8.9 8.5 - 10.1 MG/DL Bilirubin, total 0.4 0.2 - 1.0 MG/DL  
 ALT (SGPT) 16 12 - 78 U/L  
 AST (SGOT) 12 (L) 15 - 37 U/L Alk. phosphatase 57 45 - 117 U/L Protein, total 6.7 6.4 - 8.2 g/dL Albumin 3.6 3.5 - 5.0 g/dL Globulin 3.1 2.0 - 4.0 g/dL A-G Ratio 1.2 1.1 - 2.2    
TROPONIN I Collection Time: 04/08/19  3:19 PM  
Result Value Ref Range Troponin-I, Qt. <0.05 <0.05 ng/mL CK W/ REFLX CKMB Collection Time: 04/08/19  3:19 PM  
Result Value Ref Range CK 72 26 - 192 U/L  
URINALYSIS W/ REFLEX CULTURE Collection Time: 04/08/19  3:26 PM  
Result Value Ref Range Color YELLOW/STRAW  Appearance CLEAR CLEAR    
 Specific gravity 1.025 1.003 - 1.030    
 pH (UA) 6.5 5.0 - 8.0 Protein NEGATIVE  NEG mg/dL Glucose NEGATIVE  NEG mg/dL Ketone NEGATIVE  NEG mg/dL Bilirubin NEGATIVE  NEG Blood NEGATIVE  NEG Urobilinogen 1.0 0.2 - 1.0 EU/dL Nitrites NEGATIVE  NEG Leukocyte Esterase NEGATIVE  NEG    
 WBC 0-4 0 - 4 /hpf  
 RBC 0-5 0 - 5 /hpf Epithelial cells FEW FEW /lpf Bacteria NEGATIVE  NEG /hpf  
 UA:UC IF INDICATED CULTURE NOT INDICATED BY UA RESULT CNI Mucus 1+ (A) NEG /lpf  
HCG URINE, QL. - POC Collection Time: 04/08/19  3:29 PM  
Result Value Ref Range Pregnancy test,urine (POC) NEGATIVE  NEG Radiologic Studies -  
CTA CHEST W OR W WO CONT Final Result IMPRESSION:   
There is no acute pulmonary embolism or other acute abnormality in the chest.  
Reflux of intravenous contrast into the hepatic veins is noted suggesting  
elevated right heart pressure. XR CHEST PA LAT Final Result Impression: No acute process. No pneumonia CT Results  (Last 48 hours) 04/08/19 1617  CTA [address] Final result Impression:  IMPRESSION:   
There is no acute pulmonary embolism or other acute abnormality in the chest.  
Reflux of intravenous contrast into the hepatic veins is noted suggesting  
elevated right heart pressure. Narrative:  EXAM:  CTA CHEST W OR W WO CONT INDICATION: Chest pain and back pain for 2 weeks. COMPARISON: Chest radiographs 4/8/2019. TECHNIQUE: Helical thin section chest CT following uneventful intravenous  
administration of nonionic contrast according to departmental PE protocol. Coronal and sagittal reformats were performed. 3D/MIP post processing was  
performed. CT dose reduction was achieved through use of a standardized protocol  
tailored for this examination and automatic exposure control for dose  
modulation. FINDINGS: This is a good quality study for the evaluation of pulmonary embolism  
to the first subsegmental arterial level. There is no pulmonary embolism to this  
level. The visualized thyroid gland is unremarkable. The aorta and main pulmonary  
artery are normal in caliber. There is reflux of intravenous contrast into the  
hepatic veins suggesting elevated right heart pressure. Cardiac size is within  
normal limits. No pericardial effusion. No lymphadenopathy by imaging size  
criteria. The lungs are clear. No pleural effusion or pneumothorax. Central airways are  
unremarkable. Gastric surgical changes are noted. There is no acute abnormality in the  
visualized upper abdomen. CXR Results  (Last 48 hours) 04/08/19 1524  XR CHEST PA LAT Final result Impression:  Impression: No acute process. No pneumonia Narrative:  Exam:  2 view chest  
   
Indication: Chest pain, back pain, evaluate for pneumonia, midsternal chest pain x2 weeks. COMPARISON: February 27, 2018 PA and lateral views demonstrate normal heart size. There is no acute process in  
the lung fields. The osseous structures are unremarkable. Medical Decision Making I am the first provider for this patient. I reviewed the vital signs, available nursing notes, past medical history, past surgical history, family history and social history. Vital Signs-Reviewed the patient's vital signs. Patient Vitals for the past 12 hrs: 
 Temp Pulse Resp BP SpO2  
04/08/19 1451 98.7 °F (37.1 °C) 87 18 126/56 100 % Records Reviewed: Nursing Notes, Old Medical Records, Previous Radiology Studies and Previous Laboratory Studies Provider Notes (Medical Decision Making):  
Patient presents with CP. DDx:  ACS, Aortic dissection, PNA, PE, PTX, pericarditis, myocarditis, GERD, costochondritis, anxiety. CWill obtain labs, CXR, EKG and get Cardiology Consult PRN. - I have re-examined the patient and the patient denies chest pain on re-examination. The patient has had onset of chest pain greater than 8 hours and one negative set of cardiac enzymes or 2 negative sets of cardiac enzymes in the ER during this visit. The diagnosis, follow up, return instructions, test results, x-rays and medications have been discussed and reviewed with the patient. The patient has been given the opportunity to ask questions. The patient  expresses understanding of the diagnosis, follow-up and return instructions. The patient agrees to follow up with primary care or cardiology as directed and to return immediately if the chest pain worsens. The patient expresses understanding that although cardiac testing at this time is negative, a cardiac problem could still be present and that a follow-up appointment for further evaluation and risk factor modification is necessary to complete the evaluation of this complaint. 4:35 PM discussed pts results with attending, pt stable for d/c and outpt f/u Worsening si/sxs discussed extensively Follow up with PCP or RTC if symptoms/signs worsen Side effects of medication discussed Education materials provided at discharge Pt verbalizes agreement with plan ED Course:  
Initial assessment performed. The patients presenting problems have been discussed, and they are in agreement with the care plan formulated and outlined with them. I have encouraged them to ask questions as they arise throughout their visit. Disposition: 
Discharge Care plan outlined and precautions discussed. Patient has no new complaints, changes, or physical findings. Results of visit were reviewed with the patient. All medications were reviewed with the patient; will d/c home. All of pt's questions and concerns were addressed.  Patient was instructed and agrees to follow up with pcp, as well as to return to the ED upon further deterioration. Patient is ready to go home. Diagnosis Clinical Impression: 1. Atypical chest pain

## 2019-04-08 NOTE — DISCHARGE INSTRUCTIONS

## 2019-04-08 NOTE — LETTER
[facility] EMERGENCY DEPT 
[address] [id number] [phone] Work/School Note Date: 4/8/2019 To Whom It May concern: 
 
Tammy Bartlett was seen and treated today in the emergency room by the following provider(s): 
Attending Provider: Garo Pitts MD 
Physician Assistant: FELICITY You. Tammy Bartlett may return to work on 4/10/19. Sincerely, Damari Cartwright RN

## 2019-04-08 NOTE — ED TRIAGE NOTES
Pt reports to the ED with c/o mid upper back pain that radiated to the front mid sternal area x 2 weeks. Reports a history of acid reflux. Reports generalized weakness and body aches;hx of chronic joint pain. Stated she took tylenol yesterday without relief. Reports nausea and diarrhea that started yesterday. Pt is alert, oriented and appropriate. Bowel sounds active in all 4 quadrants. Lung sounds clear bilaterally. Ambulatory on arrival. Skin is warm and dry to touch. No tenderness on palpation to abdomen. Emergency Department Nursing Plan of Care The Nursing Plan of Care is developed from the Nursing assessment and Emergency Department Attending provider initial evaluation. The plan of care may be reviewed in the ED Provider note. The Plan of Care was developed with the following considerations:  
Patient / Family readiness to learn indicated by:verbalized understanding Persons(s) to be included in education: patient Barriers to Learning/Limitations:No 
 
Signed Evan Rodriguez 4/8/2019   2:54 PM

## 2019-04-09 LAB
ATRIAL RATE: 66 BPM
CALCULATED P AXIS, ECG09: 5 DEGREES
CALCULATED R AXIS, ECG10: 61 DEGREES
CALCULATED T AXIS, ECG11: 50 DEGREES
DIAGNOSIS, 93000: NORMAL
P-R INTERVAL, ECG05: 156 MS
Q-T INTERVAL, ECG07: 408 MS
QRS DURATION, ECG06: 80 MS
QTC CALCULATION (BEZET), ECG08: 427 MS
VENTRICULAR RATE, ECG03: 66 BPM

## 2019-04-12 LAB
25(OH)D3+25(OH)D2 SERPL-MCNC: 45.8 NG/ML (ref 30–100)
ALBUMIN SERPL-MCNC: 4.3 G/DL (ref 3.5–5.5)
ALBUMIN/GLOB SERPL: 1.9 {RATIO} (ref 1.2–2.2)
ALP SERPL-CCNC: 58 IU/L (ref 39–117)
ALT SERPL-CCNC: 12 IU/L (ref 0–32)
AST SERPL-CCNC: 15 IU/L (ref 0–40)
BILIRUB SERPL-MCNC: 0.2 MG/DL (ref 0–1.2)
BUN SERPL-MCNC: 8 MG/DL (ref 6–24)
BUN/CREAT SERPL: 13 (ref 9–23)
CALCIUM SERPL-MCNC: 9.2 MG/DL (ref 8.7–10.2)
CHLORIDE SERPL-SCNC: 100 MMOL/L (ref 96–106)
CO2 SERPL-SCNC: 25 MMOL/L (ref 20–29)
CREAT SERPL-MCNC: 0.64 MG/DL (ref 0.57–1)
ERYTHROCYTE [DISTWIDTH] IN BLOOD BY AUTOMATED COUNT: 12.8 % (ref 12.3–15.4)
GLOBULIN SER CALC-MCNC: 2.3 G/DL (ref 1.5–4.5)
GLUCOSE SERPL-MCNC: 84 MG/DL (ref 65–99)
HCT VFR BLD AUTO: 36.1 % (ref 34–46.6)
HGB BLD-MCNC: 11.4 G/DL (ref 11.1–15.9)
MCH RBC QN AUTO: 31.5 PG (ref 26.6–33)
MCHC RBC AUTO-ENTMCNC: 31.6 G/DL (ref 31.5–35.7)
MCV RBC AUTO: 100 FL (ref 79–97)
PLATELET # BLD AUTO: 231 X10E3/UL (ref 150–379)
POTASSIUM SERPL-SCNC: 4 MMOL/L (ref 3.5–5.2)
PREALB SERPL-MCNC: 24 MG/DL (ref 12–34)
PROT SERPL-MCNC: 6.6 G/DL (ref 6–8.5)
RBC # BLD AUTO: 3.62 X10E6/UL (ref 3.77–5.28)
SODIUM SERPL-SCNC: 140 MMOL/L (ref 134–144)
VIT B12 SERPL-MCNC: 994 PG/ML (ref 232–1245)
WBC # BLD AUTO: 5.9 X10E3/UL (ref 3.4–10.8)

## 2019-04-18 ENCOUNTER — TELEPHONE (OUTPATIENT)
Dept: SURGERY | Age: 39
End: 2019-04-18

## 2019-04-26 ENCOUNTER — OFFICE VISIT (OUTPATIENT)
Dept: SURGERY | Age: 39
End: 2019-04-26

## 2019-04-26 VITALS
OXYGEN SATURATION: 98 % | BODY MASS INDEX: 29.73 KG/M2 | WEIGHT: 196.2 LBS | SYSTOLIC BLOOD PRESSURE: 114 MMHG | HEART RATE: 73 BPM | TEMPERATURE: 98.2 F | RESPIRATION RATE: 18 BRPM | DIASTOLIC BLOOD PRESSURE: 71 MMHG | HEIGHT: 68 IN

## 2019-04-26 DIAGNOSIS — E66.01 MORBID OBESITY DUE TO EXCESS CALORIES (HCC): Primary | ICD-10-CM

## 2019-04-26 DIAGNOSIS — Z98.84 S/P LAPAROSCOPIC SLEEVE GASTRECTOMY: ICD-10-CM

## 2019-04-26 NOTE — PATIENT INSTRUCTIONS
Eating Healthy Foods: Care Instructions  Your Care Instructions    Eating healthy foods can help lower your risk for disease. Healthy food gives you energy and keeps your heart strong, your brain active, your muscles working, and your bones strong. A healthy diet includes a variety of foods from the basic food groups: grains, vegetables, fruits, milk and milk products, and meat and beans. Some people may eat more of their favorite foods from only one food group and, as a result, miss getting the nutrients they need. So, it is important to pay attention not only to what you eat but also to what you are missing from your diet. You can eat a healthy, balanced diet by making a few small changes. Follow-up care is a key part of your treatment and safety. Be sure to make and go to all appointments, and call your doctor if you are having problems. It's also a good idea to know your test results and keep a list of the medicines you take. How can you care for yourself at home? Look at what you eat  · Keep a food diary for a week or two and record everything you eat or drink. Track the number of servings you eat from each food group. · For a balanced diet every day, eat a variety of:  ? 6 or more ounce-equivalents of grains, such as cereals, breads, crackers, rice, or pasta, every day. An ounce-equivalent is 1 slice of bread, 1 cup of ready-to-eat cereal, or ½ cup of cooked rice, cooked pasta, or cooked cereal.  ? 2½ cups of vegetables, especially:  § Dark-green vegetables such as broccoli and spinach. § Orange vegetables such as carrots and sweet potatoes. § Dry beans (such as fong and kidney beans) and peas (such as lentils). ? 2 cups of fresh, frozen, or canned fruit. A small apple or 1 banana or orange equals 1 cup. ? 3 cups of nonfat or low-fat milk, yogurt, or other milk products. ? 5½ ounces of meat and beans, such as chicken, fish, lean meat, beans, nuts, and seeds.  One egg, 1 tablespoon of peanut butter, ½ ounce nuts or seeds, or ¼ cup of cooked beans equals 1 ounce of meat. · Learn how to read food labels for serving sizes and ingredients. Fast-food and convenience-food meals often contain few or no fruits or vegetables. Make sure you eat some fruits and vegetables to make the meal more nutritious. · Look at your food diary. For each food group, add up what you have eaten and then divide the total by the number of days. This will give you an idea of how much you are eating from each food group. See if you can find some ways to change your diet to make it more healthy. Start small  · Do not try to make dramatic changes to your diet all at once. You might feel that you are missing out on your favorite foods and then be more likely to fail. · Start slowly, and gradually change your habits. Try some of the following:  ? Use whole wheat bread instead of white bread. ? Use nonfat or low-fat milk instead of whole milk. ? Eat brown rice instead of white rice, and eat whole wheat pasta instead of white-flour pasta. ? Try low-fat cheeses and low-fat yogurt. ? Add more fruits and vegetables to meals and have them for snacks. ? Add lettuce, tomato, cucumber, and onion to sandwiches. ? Add fruit to yogurt and cereal.  Enjoy food  · You can still eat your favorite foods. You just may need to eat less of them. If your favorite foods are high in fat, salt, and sugar, limit how often you eat them, but do not cut them out entirely. · Eat a wide variety of foods. Make healthy choices when eating out  · The type of restaurant you choose can help you make healthy choices. Even fast-food chains are now offering more low-fat or healthier choices on the menu. · Choose smaller portions, or take half of your meal home. · When eating out, try:  ? A veggie pizza with a whole wheat crust or grilled chicken (instead of sausage or pepperoni).   ? Pasta with roasted vegetables, grilled chicken, or marinara sauce instead of cream sauce. ? A vegetable wrap or grilled chicken wrap. ? Broiled or poached food instead of fried or breaded items. Make healthy choices easy  · Buy packaged, prewashed, ready-to-eat fresh vegetables and fruits, such as baby carrots, salad mixes, and chopped or shredded broccoli and cauliflower. · Buy packaged, presliced fruits, such as melon or pineapple. · Choose 100% fruit or vegetable juice instead of soda. Limit juice intake to 4 to 6 oz (½ to ¾ cup) a day. · Blend low-fat yogurt, fruit juice, and canned or frozen fruit to make a smoothie for breakfast or a snack. Where can you learn more? Go to http://yulisa-willian.info/. Enter T756 in the search box to learn more about \"Eating Healthy Foods: Care Instructions. \"  Current as of: March 28, 2018  Content Version: 11.9  © 5219-4396 [employer]. Care instructions adapted under license by [employer] (which disclaims liability or warranty for this information). If you have questions about a medical condition or this instruction, always ask your healthcare professional. [place] [address] any warranty or liability for your use of this information.

## 2019-04-26 NOTE — PROGRESS NOTES
HISTORY OF PRESENT ILLNESS  Dain Perdomo is a 37 y.o. female with previous  Sleeve gastrectomy surgery on 4/18/2018. Jud Butt She has lost a total of 68 pounds since surgery. She  Has lost 7.5 lbs since the last ov. Body mass index is 30.28 kg/m². Jud Butt no nausea and no vomiting . +Acid reflux/heartburn, Priloisec. . Drinking  60+ ounces of water daily. ? protein intake daily. + BM's. Pt is not exercising. . She has found that she is snacking and avaoiding measl. Dietary recall -    Breakfast- skip  Lunch- chicken and seafood. Dinner- chicken, seafood. She is snacking between meals; chips, white chocolate. Vitamins:  MVI : yes  Calcium : yes  B-Vit 12: yes    Patient has an advanced directive: not on file. Ms. Brii Hanna has a reminder for a \"due or due soon\" health maintenance. I have asked that she contact her primary care provider for follow-up on this health maintenance. Co-Morbid(s)             COMORBIDITY     SLEEP APNEA                 NO        GERD  (req.meds)            NO  HYPERLIPIDEMIA            NO  HYPERTENSION              NO         DIABETES                         NO           Current Outpatient Medications:     escitalopram oxalate (LEXAPRO) 20 mg tablet, Take 20 mg by mouth daily. , Disp: , Rfl:     hydrOXYzine HCl (ATARAX) 25 mg tablet, TK 1 T PO QID PRA, Disp: , Rfl: 1    loratadine (CLARITIN) 10 mg tablet, TK 1 T PO QD, Disp: , Rfl: 1    ergocalciferol (VITAMIN D2) 50,000 unit capsule, Take 1 Cap by mouth every Monday and Friday., Disp: 24 Cap, Rfl: 2    calcium citrate-vitamin D3 (CITRACAL + D) tablet, Take 2 Tabs by mouth daily. , Disp: , Rfl:     albuterol sulfate (PROVENTIL;VENTOLIN) 2.5 mg/0.5 mL nebu nebulizer solution, 2.5 mg by Nebulization route as needed for Wheezing., Disp: , Rfl:     melatonin 5 mg cap capsule, Take 5 mg by mouth nightly as needed. , Disp: , Rfl:     MULTIVITAMIN PO, Take  by mouth daily. , Disp: , Rfl:     albuterol (PROAIR HFA) 90 mcg/actuation inhaler, Take 2 Puffs by inhalation as needed. , Disp: , Rfl:     NEBULIZER, by Does Not Apply route as needed. , Disp: , Rfl:     ASCORBATE CALCIUM (VITAMIN C PO), Take 500 mg by mouth daily. , Disp: , Rfl:     CYANOCOBALAMIN, VITAMIN B-12, (VITAMIN B-12 PO), Take 3,000 mcg by mouth daily. , Disp: , Rfl:     omeprazole (PRILOSEC) 20 mg capsule, Take 40 mg by mouth daily. , Disp: , Rfl:     Cholecalciferol, Vitamin D3, 50,000 unit cap, Take  by mouth every seven (7) days. , Disp: , Rfl:     cyclobenzaprine (FLEXERIL) 10 mg tablet, Take 1 Tab by mouth three (3) times daily as needed for Muscle Spasm(s). , Disp: 15 Tab, Rfl: 0    ondansetron (ZOFRAN ODT) 4 mg disintegrating tablet, Take 1 Tab by mouth every eight (8) hours as needed for Nausea., Disp: 10 Tab, Rfl: 0    hyoscyamine SL (LEVSIN/SL) 0.125 mg SL tablet, 1 Tab by SubLINGual route every four (4) hours as needed (upper abdominal pressure / spasm associated with drinking). , Disp: 30 Tab, Rfl: 1      Visit Vitals  /71 (BP 1 Location: Left arm, BP Patient Position: Sitting)   Pulse 73   Temp 98.2 °F (36.8 °C) (Oral)   Resp 18   Ht 5' 7.5\" (1.715 m)   Wt 196 lb 3.2 oz (89 kg)   LMP 04/01/2019   SpO2 98%   BMI 30.28 kg/m²     HPI    Review of Systems   Respiratory: Negative for shortness of breath. Cardiovascular: Negative for chest pain. Gastrointestinal: Negative for abdominal pain, heartburn, nausea and vomiting. Neurological: Negative for dizziness and headaches. Physical Exam   Constitutional: She is oriented to person, place, and time. She appears well-developed and well-nourished. Cardiovascular: Normal rate and regular rhythm. Exam reveals no gallop and no friction rub. No murmur heard. Pulmonary/Chest: Effort normal and breath sounds normal.   Abdominal: Soft. Bowel sounds are normal. She exhibits no distension. There is no tenderness. No masses or hernias noted.     Neurological: She is alert and oriented to person, place, and time.   Skin: Skin is warm and dry. Psychiatric: She has a normal mood and affect. ASSESSMENT and PLAN  Arvin Prieto is a 37 y.o. female with previous  Sleeve gastrectomy surgery on 4/18/2018. Griffin Herrera She has lost a total of 68 pounds since surgery. She  Has lost 7.5 lbs since the last ov. Body mass index is 30.28 kg/m². Advised patient regard to diet that is high-protein, low-fat, low-sugar, limited carbohydrates. Strive for 50-60 grams of protein daily. If having a snack, foods that are protein or fiber rich. . No eating/drinking together, chew foods well, and portion control. Measure meals. Discussed snacking behavior and to [facility] pay attention to behavioral factor and habits. Drink at least 40-64 ounces of water or non-calorie/non-carbonated beverages daily. Continue vitamin regiment daily. Exercise at least 3 days a week with cardiovascular and strength training. Patient to follow up in 6 months. . Advised to call office if any questions/concerns. Reviewed labs with patient. Encouraged to focus on protein. Pt verbalized understanding and questions were answered to the best of my knowledge and ability.

## 2019-04-26 NOTE — PROGRESS NOTES
1. Have you been to the ER, urgent care clinic since your last visit? Hospitalized since your last visit? No    2. Have you seen or consulted any other health care providers outside of the [address] [place] [address] since your last visit? Include any pap smears or colon screening.  No

## 2019-07-03 ENCOUNTER — HOSPITAL ENCOUNTER (EMERGENCY)
Age: 39
Discharge: HOME | End: 2019-07-03
Attending: EMERGENCY MEDICINE
Payer: MEDICAID

## 2019-07-03 VITALS
DIASTOLIC BLOOD PRESSURE: 56 MMHG | WEIGHT: 195 LBS | BODY MASS INDEX: 29.55 KG/M2 | RESPIRATION RATE: 16 BRPM | HEART RATE: 91 BPM | TEMPERATURE: 99 F | SYSTOLIC BLOOD PRESSURE: 113 MMHG | HEIGHT: 68 IN | OXYGEN SATURATION: 99 %
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
## 2019-07-03 PROCEDURE — 99282 EMERGENCY DEPT VISIT SF MDM: CPT

## 2019-07-03 RX ORDER — CEPHALEXIN 500 MG/1
500 CAPSULE ORAL 4 TIMES DAILY
Qty: 28 CAP | Refills: 0 | Status: SHIPPED | OUTPATIENT
Start: 2019-07-03 | End: 2019-07-10

## 2019-07-03 NOTE — ED NOTES
Patient presents to the ED with c/o skin problem. Pt reports that something bit her above her naval x2 days and now it is draining clear. Pt reports that the bump itches. Pt reports putting alcohol on it and antibiotic cream. Pt reports feeling sharp pain all over her abdomen and back. Pt is alert and oriented. Pt skin is warm and dry with a bump the size of a pea above her naval that is draining clear. Pt is ambulatory independently. Emergency Department Nursing Plan of Care       The Nursing Plan of Care is developed from the Nursing assessment and Emergency Department Attending provider initial evaluation. The plan of care may be reviewed in the ED Provider note.     The Plan of Care was developed with the following considerations:   Patient / Family readiness to learn indicated by:verbalized understanding  Persons(s) to be included in education: patient  Barriers to Learning/Limitations:No    Signed     Brennan Coleman    7/3/2019   6:20 PM

## 2019-07-03 NOTE — DISCHARGE INSTRUCTIONS
Patient Education        Antibiotics for Skin Conditions: Care Instructions  Your Care Instructions    Antibiotics are medicines that kill bacteria. Bacteria can cause some skin problems or conditions, such as impetigo. They can also lead to problems like acne. There are many types of antibiotics. Each works a little differently and acts on different types of bacteria. Your doctor will decide which medicine will work best for you. You can put an antibiotic ointment or cream on your skin. Or you can take pills by mouth to kill bacteria in your skin pores. Antibiotics are not used to treat skin problems that are caused by viruses or allergies. But sometimes bacteria get into a skin problem you already have. Then you may need this medicine. Follow-up care is a key part of your treatment and safety. Be sure to make and go to all appointments, and call your doctor if you are having problems. It's also a good idea to know your test results and keep a list of the medicines you take. How can you care for yourself at home? To take antibiotics  · If your doctor prescribed pills, take them as directed. Do not stop taking them just because your skin problem gets better. You need to take the full course of antibiotics. · Apply antibiotic ointment exactly as instructed. · Read the label to learn how to store your medicine. · Do not use antibiotics that were prescribed for a different illness or for someone else. You may take longer to heal. And your skin problem may get worse. To take care of your skin  · Try not to scratch rashes or sores. Scratching may spread bacteria to other parts of your skin or body. · Clean your skin with mild soap and water 2 times a day unless your doctor gives you different instructions. Don't use hydrogen peroxide or alcohol, which can slow healing. · Protect your skin from the sun.  Wear hats with wide brims, sunglasses, and loose-fitting, tightly woven clothing that covers your arms and legs. Make sure to use a broad-spectrum sunscreen that has a sun protection factor (SPF) of 30 or higher. Apply it several times a day. What are the possible side effects? Many people do not have side effects from antibiotics. But sometimes people have problems, such as:  · Nausea, diarrhea, and belly pain. · Allergic reactions, such as a skin rash. · Vaginal yeast infections. If the side effects bother you, ask your doctor if there is another antibiotic that will work as well but will not cause these effects. When should you call for help? Call your doctor now or seek immediate medical care if:    · You have signs of an infection, such as:  ? Increased pain, swelling, warmth, and redness. ? Red streaks leading from the affected area. ? Pus draining from the area. ? A fever.    Watch closely for changes in your health, and be sure to contact your doctor if:    · You think you may be having a problem with the medicine.     · You do not get better as expected. Where can you learn more? Go to http://yulisa-willian.info/. Enter Z450 in the search box to learn more about \"Antibiotics for Skin Conditions: Care Instructions. \"  Current as of: April 17, 2018  Content Version: 11.9  © 6035-8840 [employer]. Care instructions adapted under license by [employer] (which disclaims liability or warranty for this information). If you have questions about a medical condition or this instruction, always ask your healthcare professional. [place] [address] any warranty or liability for your use of this information.

## 2019-07-04 NOTE — ED PROVIDER NOTES
EMERGENCY DEPARTMENT HISTORY AND PHYSICAL EXAM    Date: 7/3/2019  Patient Name: Selene Padron    History of Presenting Illness     Chief Complaint   Patient presents with    Skin Problem         History Provided By: Patient    Chief Complaint: skin problem  Duration: 2 Days  Timing:  Acute  Location:abdomen  Quality: Tightness and itching  Severity: 4 out of 10  Modifying Factors: none  Associated Symptoms: drainage      HPI: Selene Padron is a 37 y.o. female with a PMH of asthma GERD who presents with lesion on abdomen above navel. States lesion feels tight and has been draining. Patient states she had an insect bite. She has buccal antibiotic ointment. She states area remains red. She also reports itching to area. Denies any other lesions. She denies fever. PCP: Yudith Ricardo MD    Current Outpatient Medications   Medication Sig Dispense Refill    cephALEXin (KEFLEX) 500 mg capsule Take 1 Cap by mouth four (4) times daily for 7 days. 28 Cap 0    cyclobenzaprine (FLEXERIL) 10 mg tablet Take 1 Tab by mouth three (3) times daily as needed for Muscle Spasm(s). 15 Tab 0    escitalopram oxalate (LEXAPRO) 20 mg tablet Take 20 mg by mouth daily.  hydrOXYzine HCl (ATARAX) 25 mg tablet TK 1 T PO QID PRA  1    ondansetron (ZOFRAN ODT) 4 mg disintegrating tablet Take 1 Tab by mouth every eight (8) hours as needed for Nausea. 10 Tab 0    loratadine (CLARITIN) 10 mg tablet TK 1 T PO QD  1    hyoscyamine SL (LEVSIN/SL) 0.125 mg SL tablet 1 Tab by SubLINGual route every four (4) hours as needed (upper abdominal pressure / spasm associated with drinking). 30 Tab 1    ergocalciferol (VITAMIN D2) 50,000 unit capsule Take 1 Cap by mouth every Monday and Friday. 24 Cap 2    calcium citrate-vitamin D3 (CITRACAL + D) tablet Take 2 Tabs by mouth daily.  albuterol sulfate (PROVENTIL;VENTOLIN) 2.5 mg/0.5 mL nebu nebulizer solution 2.5 mg by Nebulization route as needed for Wheezing.       melatonin 5 mg cap capsule Take 5 mg by mouth nightly as needed.  MULTIVITAMIN PO Take  by mouth daily.  albuterol (PROAIR HFA) 90 mcg/actuation inhaler Take 2 Puffs by inhalation as needed.  NEBULIZER by Does Not Apply route as needed.  ASCORBATE CALCIUM (VITAMIN C PO) Take 500 mg by mouth daily.  CYANOCOBALAMIN, VITAMIN B-12, (VITAMIN B-12 PO) Take 3,000 mcg by mouth daily.  omeprazole (PRILOSEC) 20 mg capsule Take 40 mg by mouth daily.  Cholecalciferol, Vitamin D3, 50,000 unit cap Take  by mouth every seven (7) days.          Past History     Past Medical History:  Past Medical History:   Diagnosis Date    Asthma     GERD (gastroesophageal reflux disease)     Hx gestational diabetes     Morbid obesity due to excess calories ([facility] [address] 2016    S/P laparoscopic sleeve gastrectomy 2018    robotic assisted, [facility]    Sleep apnea     NO LONGER USES CPAP    Trouble in sleeping        Past Surgical History:  Past Surgical History:   Procedure Laterality Date    COLONOSCOPY N/A 2017    COLONOSCOPY performed by Minnie Pallas, MD at [address]  2017         HX GASTRECTOMY  2018    robotic-assisted sleeve gastrectomy, [facility]    HX GI      COLONOSCOPY    HX GYN  1998        UPPER GI ENDOSCOPY,BIOPSY  2017            Family History:  Family History   Problem Relation Age of Onset    Cancer Mother         LIVER    Diabetes Mother     Hypertension Mother     Pneumonia Sister     Alcohol abuse Brother     Liver Disease Brother    Linda Pounds Sister     Hypertension Sister     Heart Attack Father     Anesth Problems Neg Hx        Social History:  Social History     Tobacco Use    Smoking status: Current Every Day Smoker     Packs/day: 0.25     Years: 10.00     Pack years: 2.50     Types: Cigars     Last attempt to quit: 2012     Years since quittin.2    Smokeless tobacco: Never Used    Tobacco comment: Black & Milds   Substance Use Topics    Alcohol use: Yes     Comment: OCCASIONALLY    Drug use: No       Allergies:  No Known Allergies      Review of Systems   Review of Systems   Constitutional: Negative for fatigue and fever. Respiratory: Negative for shortness of breath and wheezing. Cardiovascular: Negative for chest pain and palpitations. Gastrointestinal: Negative for abdominal pain. Musculoskeletal: Negative for arthralgias, myalgias, neck pain and neck stiffness. Skin: Negative for pallor. Skin lesion   Neurological: Negative for dizziness, tremors, weakness and headaches. All other systems reviewed and are negative. Physical Exam     Vitals:    07/03/19 1747   BP: 113/56   Pulse: 91   Resp: 16   Temp: 99 °F (37.2 °C)   SpO2: 99%   Weight: 88.5 kg (195 lb)   Height: 5' 8\" (1.727 m)     Physical Exam   Constitutional: She is oriented to person, place, and time. She appears well-developed and well-nourished. No distress. HENT:   Head: Normocephalic and atraumatic. Right Ear: External ear normal.   Left Ear: External ear normal.   Nose: Nose normal.   Eyes: Conjunctivae are normal.   Neck: Normal range of motion. Neck supple. Cardiovascular: Normal rate, regular rhythm and normal heart sounds. Pulmonary/Chest: Effort normal and breath sounds normal. No respiratory distress. She has no wheezes. Abdominal: Soft. Bowel sounds are normal. There is no tenderness. Musculoskeletal: Normal range of motion. Lymphadenopathy:     She has no cervical adenopathy. Neurological: She is alert and oriented to person, place, and time. No cranial nerve deficit. Coordination normal.   Skin: Skin is warm and dry. No rash noted. One cm raised erythematous lesion central pustule scant amount of drainage   Psychiatric: She has a normal mood and affect. Her behavior is normal. Judgment and thought content normal.   Nursing note and vitals reviewed.         Diagnostic Study Results     Labs -   No results found for this or any previous visit (from the past 12 hour(s)). Radiologic Studies -   No orders to display     CT Results  (Last 48 hours)    None        CXR Results  (Last 48 hours)    None            Medical Decision Making   I am the first provider for this patient. I reviewed the vital signs, available nursing notes, past medical history, past surgical history, family history and social history. Vital Signs-Reviewed the patient's vital signs. Records Reviewed: Nursing Notes            Disposition:  home    DISCHARGE NOTE:           Care plan outlined and precautions discussed. Patient has no new complaints, changes, or physical findings. . All medications were reviewed with the patient; will d/c home with keflex. All of pt's questions and concerns were addressed. Patient was instructed and agrees to follow up with PCP, as well as to return to the ED upon further deterioration. Patient is ready to go home. Follow-up Information     Follow up With Specialties Details Why Contact Info    Cornel Roche MD [facility] In 2 days  [address]  [phone]            Discharge Medication List as of 7/3/2019  6:38 PM      CONTINUE these medications which have NOT CHANGED    Details   cyclobenzaprine (FLEXERIL) 10 mg tablet Take 1 Tab by mouth three (3) times daily as needed for Muscle Spasm(s). , Normal, Disp-15 Tab, R-0      escitalopram oxalate (LEXAPRO) 20 mg tablet Take 20 mg by mouth daily. , Historical Med      hydrOXYzine HCl (ATARAX) 25 mg tablet TK 1 T PO QID PRA, Historical Med, R-1      ondansetron (ZOFRAN ODT) 4 mg disintegrating tablet Take 1 Tab by mouth every eight (8) hours as needed for Nausea., Normal, Disp-10 Tab, R-0      loratadine (CLARITIN) 10 mg tablet TK 1 T PO QD, Historical Med, R-1      hyoscyamine SL (LEVSIN/SL) 0.125 mg SL tablet 1 Tab by SubLINGual route every four (4) hours as needed (upper abdominal pressure / spasm associated with drinking). , Print, Disp-30 Tab, R-1      ergocalciferol (VITAMIN D2) 50,000 unit capsule Take 1 Cap by mouth every Monday and Friday., Normal, Disp-24 Cap, R-2      calcium citrate-vitamin D3 (CITRACAL + D) tablet Take 2 Tabs by mouth daily. , Historical Med      albuterol sulfate (PROVENTIL;VENTOLIN) 2.5 mg/0.5 mL nebu nebulizer solution 2.5 mg by Nebulization route as needed for Wheezing., Historical Med      melatonin 5 mg cap capsule Take 5 mg by mouth nightly as needed., Historical Med      MULTIVITAMIN PO Take  by mouth daily. , Historical Med      albuterol (PROAIR HFA) 90 mcg/actuation inhaler Take 2 Puffs by inhalation as needed., Historical Med      NEBULIZER by Does Not Apply route as needed., Historical Med      ASCORBATE CALCIUM (VITAMIN C PO) Take 500 mg by mouth daily. , Historical Med      CYANOCOBALAMIN, VITAMIN B-12, (VITAMIN B-12 PO) Take 3,000 mcg by mouth daily. , Historical Med      omeprazole (PRILOSEC) 20 mg capsule Take 40 mg by mouth daily. , Historical Med      Cholecalciferol, Vitamin D3, 50,000 unit cap Take  by mouth every seven (7) days. , Historical Med             Provider Notes (Medical Decision Making):   DDX abscess infected insect bite cellulitis  Procedures:  Procedures        Diagnosis     Clinical Impression:   1.  Bug bite with infection, initial encounter

## 2019-08-30 ENCOUNTER — APPOINTMENT (OUTPATIENT)
Dept: CT IMAGING | Age: 39
End: 2019-08-30
Attending: EMERGENCY MEDICINE
Payer: MEDICAID

## 2019-08-30 ENCOUNTER — HOSPITAL ENCOUNTER (EMERGENCY)
Age: 39
Discharge: HOME | End: 2019-08-30
Attending: EMERGENCY MEDICINE
Payer: MEDICAID

## 2019-08-30 VITALS
DIASTOLIC BLOOD PRESSURE: 59 MMHG | WEIGHT: 205.7 LBS | TEMPERATURE: 98.2 F | OXYGEN SATURATION: 100 % | SYSTOLIC BLOOD PRESSURE: 104 MMHG | RESPIRATION RATE: 18 BRPM | HEART RATE: 66 BPM | BODY MASS INDEX: 31.28 KG/M2

## 2019-08-30 DIAGNOSIS — K57.92 DIVERTICULITIS: Primary | ICD-10-CM

## 2019-08-30 LAB
ALBUMIN SERPL-MCNC: 3.9 G/DL (ref 3.5–5)
ALBUMIN/GLOB SERPL: 1.2 {RATIO} (ref 1.1–2.2)
ALP SERPL-CCNC: 60 U/L (ref 45–117)
ALT SERPL-CCNC: 17 U/L (ref 12–78)
AMPHET UR QL SCN: NEGATIVE
ANION GAP SERPL CALC-SCNC: 8 MMOL/L (ref 5–15)
APPEARANCE UR: CLEAR
AST SERPL-CCNC: 13 U/L (ref 15–37)
BACTERIA URNS QL MICRO: NEGATIVE /HPF
BARBITURATES UR QL SCN: NEGATIVE
BASOPHILS # BLD: 0.1 K/UL (ref 0–0.1)
BASOPHILS NFR BLD: 1 % (ref 0–1)
BENZODIAZ UR QL: NEGATIVE
BILIRUB SERPL-MCNC: 0.6 MG/DL (ref 0.2–1)
BILIRUB UR QL: NEGATIVE
BUN SERPL-MCNC: 13 MG/DL (ref 6–20)
BUN/CREAT SERPL: 16 (ref 12–20)
CALCIUM SERPL-MCNC: 9 MG/DL (ref 8.5–10.1)
CANNABINOIDS UR QL SCN: POSITIVE
CHLORIDE SERPL-SCNC: 101 MMOL/L (ref 97–108)
CO2 SERPL-SCNC: 29 MMOL/L (ref 21–32)
COCAINE UR QL SCN: NEGATIVE
COLOR UR: ABNORMAL
CREAT SERPL-MCNC: 0.83 MG/DL (ref 0.55–1.02)
DIFFERENTIAL METHOD BLD: ABNORMAL
DRUG SCRN COMMENT,DRGCM: ABNORMAL
EOSINOPHIL # BLD: 0.3 K/UL (ref 0–0.4)
EOSINOPHIL NFR BLD: 3 % (ref 0–7)
EPITH CASTS URNS QL MICRO: NORMAL /LPF
ERYTHROCYTE [DISTWIDTH] IN BLOOD BY AUTOMATED COUNT: 11.9 % (ref 11.5–14.5)
GLOBULIN SER CALC-MCNC: 3.2 G/DL (ref 2–4)
GLUCOSE SERPL-MCNC: 90 MG/DL (ref 65–100)
GLUCOSE UR STRIP.AUTO-MCNC: NEGATIVE MG/DL
HCG UR QL: NEGATIVE
HCT VFR BLD AUTO: 33.9 % (ref 35–47)
HGB BLD-MCNC: 11.3 G/DL (ref 11.5–16)
HGB UR QL STRIP: ABNORMAL
IMM GRANULOCYTES # BLD AUTO: 0 K/UL (ref 0–0.04)
IMM GRANULOCYTES NFR BLD AUTO: 0 % (ref 0–0.5)
KETONES UR QL STRIP.AUTO: NEGATIVE MG/DL
LACTATE SERPL-SCNC: 0.9 MMOL/L (ref 0.4–2)
LEUKOCYTE ESTERASE UR QL STRIP.AUTO: NEGATIVE
LIPASE SERPL-CCNC: 202 U/L (ref 73–393)
LYMPHOCYTES # BLD: 1.2 K/UL (ref 0.8–3.5)
LYMPHOCYTES NFR BLD: 13 % (ref 12–49)
MCH RBC QN AUTO: 32.5 PG (ref 26–34)
MCHC RBC AUTO-ENTMCNC: 33.3 G/DL (ref 30–36.5)
MCV RBC AUTO: 97.4 FL (ref 80–99)
METHADONE UR QL: NEGATIVE
MONOCYTES # BLD: 0.8 K/UL (ref 0–1)
MONOCYTES NFR BLD: 9 % (ref 5–13)
NEUTS SEG # BLD: 6.8 K/UL (ref 1.8–8)
NEUTS SEG NFR BLD: 74 % (ref 32–75)
NITRITE UR QL STRIP.AUTO: NEGATIVE
NRBC # BLD: 0 K/UL (ref 0–0.01)
NRBC BLD-RTO: 0 PER 100 WBC
OPIATES UR QL: NEGATIVE
PCP UR QL: NEGATIVE
PH UR STRIP: 7 [PH] (ref 5–8)
PLATELET # BLD AUTO: 208 K/UL (ref 150–400)
PMV BLD AUTO: 10.6 FL (ref 8.9–12.9)
POTASSIUM SERPL-SCNC: 4 MMOL/L (ref 3.5–5.1)
PROT SERPL-MCNC: 7.1 G/DL (ref 6.4–8.2)
PROT UR STRIP-MCNC: NEGATIVE MG/DL
RBC # BLD AUTO: 3.48 M/UL (ref 3.8–5.2)
RBC #/AREA URNS HPF: NORMAL /HPF (ref 0–5)
SODIUM SERPL-SCNC: 138 MMOL/L (ref 136–145)
SP GR UR REFRACTOMETRY: 1.02 (ref 1–1.03)
UROBILINOGEN UR QL STRIP.AUTO: 1 EU/DL (ref 0.2–1)
WBC # BLD AUTO: 9.2 K/UL (ref 3.6–11)
WBC URNS QL MICRO: NORMAL /HPF (ref 0–4)

## 2019-08-30 PROCEDURE — 83605 ASSAY OF LACTIC ACID: CPT

## 2019-08-30 PROCEDURE — 74177 CT ABD & PELVIS W/CONTRAST: CPT

## 2019-08-30 PROCEDURE — 85025 COMPLETE CBC W/AUTO DIFF WBC: CPT

## 2019-08-30 PROCEDURE — 96374 THER/PROPH/DIAG INJ IV PUSH: CPT

## 2019-08-30 PROCEDURE — 81001 URINALYSIS AUTO W/SCOPE: CPT

## 2019-08-30 PROCEDURE — 74011636320 HC RX REV CODE- 636/320: Performed by: EMERGENCY MEDICINE

## 2019-08-30 PROCEDURE — 80053 COMPREHEN METABOLIC PANEL: CPT

## 2019-08-30 PROCEDURE — 74011250636 HC RX REV CODE- 250/636: Performed by: EMERGENCY MEDICINE

## 2019-08-30 PROCEDURE — 81025 URINE PREGNANCY TEST: CPT

## 2019-08-30 PROCEDURE — 99283 EMERGENCY DEPT VISIT LOW MDM: CPT

## 2019-08-30 PROCEDURE — 83690 ASSAY OF LIPASE: CPT

## 2019-08-30 PROCEDURE — 36415 COLL VENOUS BLD VENIPUNCTURE: CPT

## 2019-08-30 PROCEDURE — 80307 DRUG TEST PRSMV CHEM ANLYZR: CPT

## 2019-08-30 RX ORDER — CIPROFLOXACIN 500 MG/1
500 TABLET ORAL 2 TIMES DAILY
Qty: 20 TAB | Refills: 0 | Status: SHIPPED | OUTPATIENT
Start: 2019-08-30 | End: 2019-09-09

## 2019-08-30 RX ORDER — KETOROLAC TROMETHAMINE 30 MG/ML
15 INJECTION, SOLUTION INTRAMUSCULAR; INTRAVENOUS
Status: COMPLETED | OUTPATIENT
Start: 2019-08-30 | End: 2019-08-30

## 2019-08-30 RX ORDER — SODIUM CHLORIDE 0.9 % (FLUSH) 0.9 %
10 SYRINGE (ML) INJECTION
Status: COMPLETED | OUTPATIENT
Start: 2019-08-30 | End: 2019-08-30

## 2019-08-30 RX ORDER — METRONIDAZOLE 500 MG/1
500 TABLET ORAL 2 TIMES DAILY
Qty: 20 TAB | Refills: 0 | Status: SHIPPED | OUTPATIENT
Start: 2019-08-30 | End: 2019-09-09

## 2019-08-30 RX ADMIN — KETOROLAC TROMETHAMINE 15 MG: 30 INJECTION, SOLUTION INTRAMUSCULAR; INTRAVENOUS at 06:59

## 2019-08-30 RX ADMIN — IOPAMIDOL 100 ML: 755 INJECTION, SOLUTION INTRAVENOUS at 08:10

## 2019-08-30 RX ADMIN — Medication 10 ML: at 08:10

## 2019-08-30 RX ADMIN — SODIUM CHLORIDE 1000 ML: 900 INJECTION, SOLUTION INTRAVENOUS at 06:59

## 2019-08-30 NOTE — ED TRIAGE NOTES
Pt comes in with L sided abdominal pain  \"that feels like a bubble\" starting yesterday. Pt reports using suppositories and simethicone for gas. Pt endorses history of gastric sleeve 04/2018 and uterine fibroids.

## 2019-08-30 NOTE — ED NOTES
Provider at bedside reviewing CT results and plan of care. Pt accepted DC data and med's. PIV removed intact and pt left unit steady gait. Patient (s)  given copy of dc instructions and 2 script(s). Patient (s) verbalized understanding of instructions and script (s). Patient given a current medication reconciliation form and verbalized understanding of their medications. Patient (s) verbalized understanding of the importance of discussing medications with  his or her physician or clinic they will be following up with. Patient alert and oriented and in no acute distress. Patient discharged home ambulatory with self.

## 2019-08-30 NOTE — ED NOTES
Care assumed by TAMMY Lopes RN from off going shift RN. Pt received A+Ox3 clear speaking. IV establish and labs, med's and IV fluids administered by off going nurse.

## 2019-08-30 NOTE — ED PROVIDER NOTES
78-year-old female presents with 7/10 left lower quadrant pain radiating across to her right lower quadrant as well as to her back. It began suddenly yesterday while she was sitting on at rest.  It has been constant since then, worsening when she walks or moves. She reports loss of appetite and one episode of vomiting yesterday. She denies diarrhea. She reports associated constipation with no bowel movement in the 2 days prior to symptom onset. She did try a suppository yesterday and had a small stool without relief. Also tried Gas-ex without relief. She reports associated pressure on urination but denies dysuria or difficulty urinating. She denies blood in her stool or vaginal discharge. Is on the last day of her normal menstruation. Denies history of prior pain like this. She had a gastric sleeve done a year ago at [facility]. She also states she has a history of ovarian cyst and uterine fibroids.            Past Medical History:   Diagnosis Date    Asthma     GERD (gastroesophageal reflux disease)     Hx gestational diabetes     Morbid obesity due to excess calories ([facility] [address] 2016    S/P laparoscopic sleeve gastrectomy 2018    robotic assisted, Jony    Sleep apnea     NO LONGER USES CPAP    Trouble in sleeping        Past Surgical History:   Procedure Laterality Date    COLONOSCOPY N/A 2017    COLON[facility]  2017         HX GASTRECTOMY  2018    robotic-assisted sleeve gastrectomy, [facility]    HX GI      COLONOSCOPY    HX GYN  1998        UPPER GI ENDOSCOPY,BIOPSY  2017              Family History:   Problem Relation Age of Onset    Cancer Mother         LIVER    Diabetes Mother     Hypertension Mother     Pneumonia Sister     Alcohol abuse Brother     Liver Disease Brother    Spero Austen Sister     Hypertension Sister     Heart Attack Father     Anesth Problems Neg Hx Social History     Socioeconomic History    Marital status: SINGLE     Spouse name: Not on file    Number of children: 2    Years of education: Not on file    Highest education level: Not on file   Occupational History    Occupation: Residential Counselor   Social Needs    Financial resource strain: Not on file    Food insecurity:     Worry: Not on file     Inability: Not on file    Transportation needs:     Medical: Not on file     Non-medical: Not on file   Tobacco Use    Smoking status: Current Every Day Smoker     Packs/day: 0.25     Years: 10.00     Pack years: 2.50     Types: Cigars     Last attempt to quit: 2012     Years since quittin.3    Smokeless tobacco: Never Used    Tobacco comment: Black & Milds   Substance and Sexual Activity    Alcohol use: Yes     Comment: OCCASIONALLY    Drug use: No    Sexual activity: Never   Lifestyle    Physical activity:     Days per week: Not on file     Minutes per session: Not on file    Stress: Not on file   Relationships    Social connections:     Talks on phone: Not on file     Gets together: Not on file     Attends [employer] service: Not on file     Active member of club or organization: Not on file     Attends meetings of clubs or organizations: Not on file     Relationship status: Not on file    Intimate partner violence:     Fear of current or ex partner: Not on file     Emotionally abused: Not on file     Physically abused: Not on file     Forced sexual activity: Not on file   Other Topics Concern    Not on file   Social History Narrative    Not on file         ALLERGIES: Patient has no known allergies. Review of Systems   Constitutional: Positive for appetite change. Negative for chills, fever and unexpected weight change. HENT: Negative. Negative for congestion and trouble swallowing. Eyes: Negative for discharge. Respiratory: Negative. Negative for cough, chest tightness and shortness of breath.     Cardiovascular: Negative. Negative for chest pain. Gastrointestinal: Positive for abdominal pain, constipation and vomiting. Negative for abdominal distention, diarrhea and nausea. Endocrine: Negative. Genitourinary: Positive for pelvic pain. Negative for difficulty urinating, dysuria, frequency and urgency. Musculoskeletal: Negative. Negative for arthralgias and myalgias. Skin: Negative. Negative for color change. Allergic/Immunologic: Negative. Neurological: Negative. Negative for dizziness, speech difficulty and headaches. Hematological: Negative. Psychiatric/Behavioral: Negative. Negative for agitation and confusion. All other systems reviewed and are negative. Vitals:    08/30/19 0636   BP: 113/79   Pulse: 76   Resp: 18   Temp: 98.2 °F (36.8 °C)   SpO2: 99%   Weight: 93.3 kg (205 lb 11.2 oz)            Physical Exam   Constitutional: She is oriented to person, place, and time. She appears well-developed and well-nourished. Antalgic gait. Ambulating with obvious discomfort, hand on llq. HENT:   Head: Normocephalic and atraumatic. Eyes: Conjunctivae and EOM are normal.   Neck: Neck supple. Cardiovascular: Normal rate, regular rhythm and intact distal pulses. Pulmonary/Chest: Effort normal. No respiratory distress. Abdominal: Soft. She exhibits no shifting dullness and no distension. There is tenderness in the left lower quadrant. There is guarding. There is no CVA tenderness, no tenderness at McBurney's point and negative Lo's sign. Musculoskeletal: Normal range of motion. She exhibits no deformity. Neurological: She is alert and oriented to person, place, and time. Skin: Skin is warm and dry. Psychiatric: She has a normal mood and affect. Her behavior is normal. Thought content normal.   Vitals reviewed.        MDM  Number of Diagnoses or Management Options  Abdominal pain, LLQ (left lower quadrant):   Diagnosis management comments: Diverticulitis, nephrolithiasis, appendicitis, colitis, Crohn's, pregnancy/ectopic, ovarian cyst, ovarian torsion, ovarian abscess, constipation, ileus, obstruction, abdominal aortic aneurysm, pyelonephritis, mesenteric ischemia. Care signed out to Dr. Rose Solano at [facility].    ED Course as of Sep 06 0043   Fri Aug 30, 2019   3014 Found to have diverticulitis on CT. Discussed management at length with her. Patient states she feels better after medications and fluids. [JS]      ED Course User Index  [JS] Elizabeth Carbajal MD       Procedures        LABORATORY TESTS:  No results found for this or any previous visit (from the past 12 hour(s)). IMAGING RESULTS:  CT ABD PELV W CONT    (Results Pending)       MEDICATIONS GIVEN:  Medications   sodium chloride 0.9 % bolus infusion 1,000 mL (has no administration in time range)   ketorolac (TORADOL) injection 15 mg (has no administration in time range)       IMPRESSION:  1. Abdominal pain, LLQ (left lower quadrant)        PLAN:  1. Current Discharge Medication List        2.    Follow-up Information    None       Return to ED if worse

## 2019-08-30 NOTE — DISCHARGE INSTRUCTIONS
Patient Education        Diverticulitis: Care Instructions  Your Care Instructions    Diverticulitis occurs when pouches form in the wall of the colon and become inflamed or infected. It can be very painful. Doctors aren't sure what causes diverticulitis. There is no proof that foods such as nuts, seeds, or berries cause it or make it worse. A low-fiber diet may cause the colon to work harder to push stool forward. Pouches may form because of this extra work. It may be hard to think about healthy eating while you're in pain. But as you recover, you might think about how you can use healthy eating for overall better health. Healthy eating may help you avoid future attacks. Follow-up care is a key part of your treatment and safety. Be sure to make and go to all appointments, and call your doctor if you are having problems. It's also a good idea to know your test results and keep a list of the medicines you take. How can you care for yourself at home? · Drink plenty of fluids, enough so that your urine is light yellow or clear like water. If you have kidney, heart, or liver disease and have to limit fluids, talk with your doctor before you increase the amount of fluids you drink. · Stick to liquids or a bland diet (plain rice, bananas, dry toast or crackers, applesauce) until you are feeling better. Then you can return to regular foods and gradually increase the amount of fiber in your diet. · Use a heating pad set on low on your belly to relieve mild cramps and pain. · Get extra rest until you are feeling better. · Be safe with medicines. Read and follow all instructions on the label. ? If the doctor gave you a prescription medicine for pain, take it as prescribed. ? If you are not taking a prescription pain medicine, ask your doctor if you can take an over-the-counter medicine. · If your doctor prescribed antibiotics, take them as directed. Do not stop taking them just because you feel better.  You need to take the full course of antibiotics. To prevent future attacks of diverticulitis  · Avoid constipation:  ? Include fruits, vegetables, beans, and whole grains in your diet each day. These foods are high in fiber. ? Drink plenty of fluids, enough so that your urine is light yellow or clear like water. If you have kidney, heart, or liver disease and have to limit fluids, talk with your doctor before you increase the amount of fluids you drink. ? Get some exercise every day. Build up slowly to 30 to 60 minutes a day on 5 or more days of the week. ? Take a fiber supplement, such as Citrucel or Metamucil, every day if needed. Read and follow all instructions on the label. ? Schedule time each day for a bowel movement. Having a daily routine may help. Take your time and do not strain when having a bowel movement. When should you call for help? Call your doctor now or seek immediate medical care if:    · You have a fever.     · You are vomiting.     · You have new or worse belly pain.     · You cannot pass stools or gas.    Watch closely for changes in your health, and be sure to contact your doctor if you have any problems. Where can you learn more? Go to http://yulisa-willian.info/. Enter H901 in the search box to learn more about \"Diverticulitis: Care Instructions. \"  Current as of: November 7, 2018  Content Version: 12.1  © 3355-4367 [employer]. Care instructions adapted under license by [employer] (which disclaims liability or warranty for this information). If you have questions about a medical condition or this instruction, always ask your healthcare professional. [place] [address] any warranty or liability for your use of this information.

## 2019-08-30 NOTE — ED NOTES
Pt presents to ED ambulatory complaining of left-sided abdominal pain that she describes a a \"pressure\" x yesterday. Pt reporting N/V yesterday but no episodes of emesis today. Pt reports constipation this morning that resolved after using a stool softener. Pt reports hx of gastric sleeve in April 2018. Pt is alert and oriented x 4, RR even and unlabored, skin is warm and dry. Assessment completed and pt updated on plan of care. Emergency Department Nursing Plan of Care       The Nursing Plan of Care is developed from the Nursing assessment and Emergency Department Attending provider initial evaluation. The plan of care may be reviewed in the ED Provider note.     The Plan of Care was developed with the following considerations:   Patient / Family readiness to learn indicated by:verbalized understanding  Persons(s) to be included in education: patient  Barriers to Learning/Limitations:No    Signed     Mckinley Mcqueen    8/30/2019   7:04 AM

## 2020-02-18 ENCOUNTER — TELEPHONE (OUTPATIENT)
Dept: SURGERY | Age: 40
End: 2020-02-18

## 2020-03-24 DIAGNOSIS — E55.9 VITAMIN D DEFICIENCY: Primary | ICD-10-CM

## 2020-03-24 RX ORDER — ASPIRIN 325 MG
50000 TABLET, DELAYED RELEASE (ENTERIC COATED) ORAL
Qty: 24 CAP | Refills: 0 | OUTPATIENT
Start: 2020-03-27

## 2020-03-24 NOTE — TELEPHONE ENCOUNTER
Refill request for vitamin D 50,000 units received from [facility] [place] denied patient needs office follow up in order to consider refill.

## (undated) DEVICE — BAG SPEC BIOHZRD 10 X 10 IN --

## (undated) DEVICE — REM POLYHESIVE ADULT PATIENT RETURN ELECTRODE: Brand: VALLEYLAB

## (undated) DEVICE — SOLUTION IV 1000ML 0.9% SOD CHL

## (undated) DEVICE — FORCEPS BX L160CM DIA8MM GRSP DISECT CUP TIP NONLOCKING ROT

## (undated) DEVICE — BLADELESS OPTICAL TROCAR WITH FIXATION CANNULA: Brand: VERSAONE

## (undated) DEVICE — CONTAINER SPEC 20 ML LID NEUT BUFF FORMALIN 10 % POLYPR STS

## (undated) DEVICE — SUTURE DEV SZ 0 L6IN N ABSORBABLE

## (undated) DEVICE — VISUALIZATION SYSTEM: Brand: CLEARIFY

## (undated) DEVICE — TOWEL 4 PLY TISS 19X30 SUE WHT

## (undated) DEVICE — KENDALL RADIOLUCENT FOAM MONITORING ELECTRODE RECTANGULAR SHAPE: Brand: KENDALL

## (undated) DEVICE — DRAPE,REIN 53X77,STERILE: Brand: MEDLINE

## (undated) DEVICE — SUTURE MCRYL SZ 4-0 L27IN ABSRB UD L19MM PS-2 1/2 CIR PRIM Y426H

## (undated) DEVICE — SYR 3ML LL TIP 1/10ML GRAD --

## (undated) DEVICE — BASIN EMESIS 500CC ROSE 250/CS 60/PLT: Brand: MEDEGEN MEDICAL PRODUCTS, LLC

## (undated) DEVICE — DEVON™ KNEE AND BODY STRAP 60" X 3" (1.5 M X 7.6 CM): Brand: DEVON

## (undated) DEVICE — SUTURE VCRL SZ 2-0 L27IN ABSRB VLT RB-1 L17MM 1/2 CIR J306H

## (undated) DEVICE — SOLIDIFIER MEDC 1200ML -- CONVERT TO 356117

## (undated) DEVICE — Z INACTIVE USE 2240337 DRAPE SURG PT TRANSFER TRAWAY SHT

## (undated) DEVICE — Device: Brand: MEDEX

## (undated) DEVICE — 1200 GUARD II KIT W/5MM TUBE W/O VAC TUBE: Brand: GUARDIAN

## (undated) DEVICE — MARYLAND JAW LAPAROSCOPIC SEALER/DIVIDER COATED: Brand: LIGASURE

## (undated) DEVICE — Device

## (undated) DEVICE — APPLICATOR BNDG 1MM ADH PREMIERPRO EXOFIN

## (undated) DEVICE — INSUFFLATION NEEDLE: Brand: SURGINEEDLE

## (undated) DEVICE — TUBING INSUF HEAT STRL 10 FT --

## (undated) DEVICE — INFECTION CONTROL KIT SYS

## (undated) DEVICE — SURGICAL PROCEDURE KIT GEN LAPAROSCOPY LF

## (undated) DEVICE — MEDI-VAC YANK SUCT HNDL W/TPRD BULBOUS TIP: Brand: CARDINAL HEALTH

## (undated) DEVICE — 3000CC GUARDIAN II: Brand: GUARDIAN

## (undated) DEVICE — ARTICULATING RELOAD WITH TRI-STAPLE TECHNOLOGY: Brand: ENDO GIA

## (undated) DEVICE — CATH IV AUTOGRD BC PNK 20GA 25 -- INSYTE

## (undated) DEVICE — MEDI-VAC NON-CONDUCTIVE SUCTION TUBING: Brand: CARDINAL HEALTH

## (undated) DEVICE — SUTURE SZ 0 27IN 5/8 CIR UR-6  TAPER PT VIOLET ABSRB VICRYL J603H

## (undated) DEVICE — SUTURE V-LOC 180 SZ 3-0 L6IN ABSRB VLT CV-23 L17MM 1/2 CIR VLOCM0804

## (undated) DEVICE — BLOCK BITE ENDOSCP AD 21 MM W/ DIL BLU LF DISP

## (undated) DEVICE — (D)PREP SKN CHLRAPRP APPL 26ML -- CONVERT TO ITEM 371833

## (undated) DEVICE — SUTURE PERMAHAND SZ 2-0 L30IN NONABSORBABLE BLK L17MM RB-1 K873H

## (undated) DEVICE — DRAPE KIT ACCS 4-ARM DISP -- DA VINCI

## (undated) DEVICE — KENDALL SCD EXPRESS SLEEVES, KNEE LENGTH, MEDIUM: Brand: KENDALL SCD

## (undated) DEVICE — Z DISCONTINUED PER MEDLINE LINE GAS SAMPLING O2/CO2 LNG AD 13 FT NSL W/ TBNG FILTERLINE

## (undated) DEVICE — SET ADMIN 16ML TBNG L100IN 2 Y INJ SITE IV PIGGY BK DISP

## (undated) DEVICE — 39" SINGLE PATIENT USE HOVERMATT BREATHABLE: Brand: SINGLE PATIENT USE HOVERMATT

## (undated) DEVICE — NEEDLE HYPO 18GA L1.5IN PNK S STL HUB POLYPR SHLD REG BVL

## (undated) DEVICE — (D)SYR 10ML 1/5ML GRAD NSAF -- PKGING CHANGE USE ITEM 338027

## (undated) DEVICE — NEEDLE HYPO 22GA L1.5IN BLK S STL HUB POLYPR SHLD REG BVL

## (undated) DEVICE — RELOAD STPL 45MM THCK TISS GRN W/ GRIPPING SURF TECHNOLOGY

## (undated) DEVICE — ENDO CARRY-ON PROCEDURE KIT INCLUDES ENZYMATIC SPONGE, GAUZE, BIOHAZARD LABEL, TRAY, LUBRICANT, DIRTY SCOPE LABEL, WATER LABEL, TRAY, DRAWSTRING PAD, AND DEFENDO 4-PIECE KIT.: Brand: ENDO CARRY-ON PROCEDURE KIT

## (undated) DEVICE — STERILE POLYISOPRENE POWDER-FREE SURGICAL GLOVES: Brand: PROTEXIS